# Patient Record
Sex: FEMALE | Race: WHITE | NOT HISPANIC OR LATINO | Employment: OTHER | URBAN - METROPOLITAN AREA
[De-identification: names, ages, dates, MRNs, and addresses within clinical notes are randomized per-mention and may not be internally consistent; named-entity substitution may affect disease eponyms.]

---

## 2017-05-22 ENCOUNTER — COMMUNICATION - HEALTHEAST (OUTPATIENT)
Dept: FAMILY MEDICINE | Facility: CLINIC | Age: 62
End: 2017-05-22

## 2017-10-19 ENCOUNTER — OFFICE VISIT - HEALTHEAST (OUTPATIENT)
Dept: FAMILY MEDICINE | Facility: CLINIC | Age: 62
End: 2017-10-19

## 2017-10-19 DIAGNOSIS — R03.0 PREHYPERTENSION: ICD-10-CM

## 2017-10-19 DIAGNOSIS — L57.0 ACTINIC KERATOSIS: ICD-10-CM

## 2017-10-19 DIAGNOSIS — Z00.00 ROUTINE GENERAL MEDICAL EXAMINATION AT A HEALTH CARE FACILITY: ICD-10-CM

## 2017-10-19 DIAGNOSIS — M85.80 OSTEOPENIA: ICD-10-CM

## 2017-10-19 DIAGNOSIS — Z12.39 SCREENING FOR MALIGNANT NEOPLASM OF BREAST: ICD-10-CM

## 2017-10-19 DIAGNOSIS — Z12.31 ENCOUNTER FOR SCREENING MAMMOGRAM FOR MALIGNANT NEOPLASM OF BREAST: ICD-10-CM

## 2017-10-19 DIAGNOSIS — Z12.39 SCREENING FOR BREAST CANCER: ICD-10-CM

## 2017-10-19 DIAGNOSIS — M54.2 NECK PAIN: ICD-10-CM

## 2017-10-19 DIAGNOSIS — M79.603 ARM PAIN: ICD-10-CM

## 2017-10-19 DIAGNOSIS — E78.5 HYPERLIPIDEMIA: ICD-10-CM

## 2017-10-19 DIAGNOSIS — Z23 NEED FOR VACCINATION: ICD-10-CM

## 2017-10-19 DIAGNOSIS — K63.5 COLON POLYPS: ICD-10-CM

## 2017-10-19 ASSESSMENT — MIFFLIN-ST. JEOR: SCORE: 1199.86

## 2017-10-23 ENCOUNTER — AMBULATORY - HEALTHEAST (OUTPATIENT)
Dept: SCHEDULING | Facility: CLINIC | Age: 62
End: 2017-10-23

## 2017-10-23 DIAGNOSIS — M85.80 OSTEOPENIA: ICD-10-CM

## 2017-11-09 ENCOUNTER — HOSPITAL ENCOUNTER (OUTPATIENT)
Dept: MAMMOGRAPHY | Facility: HOSPITAL | Age: 62
Discharge: HOME | End: 2017-11-09
Attending: FAMILY MEDICINE

## 2017-11-09 DIAGNOSIS — Z12.31 ENCOUNTER FOR SCREENING MAMMOGRAM FOR MALIGNANT NEOPLASM OF BREAST: ICD-10-CM

## 2017-11-13 ENCOUNTER — COMMUNICATION - HEALTHEAST (OUTPATIENT)
Dept: PHYSICAL MEDICINE AND REHAB | Facility: CLINIC | Age: 62
End: 2017-11-13

## 2017-11-17 ENCOUNTER — AMBULATORY - HEALTHEAST (OUTPATIENT)
Dept: NURSING | Facility: CLINIC | Age: 62
End: 2017-11-17

## 2017-11-17 ENCOUNTER — AMBULATORY - HEALTHEAST (OUTPATIENT)
Dept: LAB | Facility: CLINIC | Age: 62
End: 2017-11-17

## 2017-11-17 DIAGNOSIS — E78.5 HYPERLIPIDEMIA: ICD-10-CM

## 2017-11-17 DIAGNOSIS — M85.80 OSTEOPENIA: ICD-10-CM

## 2017-11-17 DIAGNOSIS — R03.0 PREHYPERTENSION: ICD-10-CM

## 2017-11-17 LAB
CHOLEST SERPL-MCNC: 211 MG/DL
FASTING STATUS PATIENT QL REPORTED: YES
HDLC SERPL-MCNC: 62 MG/DL
LDLC SERPL CALC-MCNC: 134 MG/DL
TRIGL SERPL-MCNC: 75 MG/DL

## 2017-11-21 ENCOUNTER — COMMUNICATION - HEALTHEAST (OUTPATIENT)
Dept: FAMILY MEDICINE | Facility: CLINIC | Age: 62
End: 2017-11-21

## 2018-02-12 ENCOUNTER — COMMUNICATION - HEALTHEAST (OUTPATIENT)
Dept: FAMILY MEDICINE | Facility: CLINIC | Age: 63
End: 2018-02-12

## 2018-03-09 ENCOUNTER — HOSPITAL ENCOUNTER (OUTPATIENT)
Dept: PHYSICAL MEDICINE AND REHAB | Facility: CLINIC | Age: 63
Discharge: HOME | End: 2018-03-09
Attending: FAMILY MEDICINE

## 2018-03-09 DIAGNOSIS — M54.50 LUMBAR SPINE PAIN: ICD-10-CM

## 2018-03-09 DIAGNOSIS — M47.812 CERVICAL SPONDYLOSIS: ICD-10-CM

## 2018-03-09 DIAGNOSIS — M54.2 CERVICALGIA: ICD-10-CM

## 2018-03-09 DIAGNOSIS — M25.512 LEFT SHOULDER PAIN: ICD-10-CM

## 2018-03-09 ASSESSMENT — MIFFLIN-ST. JEOR: SCORE: 1215.81

## 2018-03-15 ENCOUNTER — COMMUNICATION - HEALTHEAST (OUTPATIENT)
Dept: PHYSICAL MEDICINE AND REHAB | Facility: CLINIC | Age: 63
End: 2018-03-15

## 2018-03-15 DIAGNOSIS — M25.519 SHOULDER PAIN: ICD-10-CM

## 2018-03-21 ENCOUNTER — HOSPITAL ENCOUNTER (OUTPATIENT)
Dept: PHYSICAL MEDICINE AND REHAB | Facility: CLINIC | Age: 63
Discharge: HOME | End: 2018-03-21
Attending: PHYSICAL MEDICINE & REHABILITATION

## 2018-03-21 DIAGNOSIS — M67.912 TENDINOPATHY OF ROTATOR CUFF, LEFT: ICD-10-CM

## 2018-03-21 DIAGNOSIS — M25.519 SHOULDER PAIN: ICD-10-CM

## 2018-03-21 DIAGNOSIS — M19.012 GLENOHUMERAL ARTHRITIS, LEFT: ICD-10-CM

## 2018-03-21 ASSESSMENT — MIFFLIN-ST. JEOR: SCORE: 1215.81

## 2018-03-26 ENCOUNTER — OFFICE VISIT - HEALTHEAST (OUTPATIENT)
Dept: PHYSICAL THERAPY | Facility: CLINIC | Age: 63
End: 2018-03-26

## 2018-03-26 DIAGNOSIS — M54.50 CHRONIC BILATERAL LOW BACK PAIN WITHOUT SCIATICA: ICD-10-CM

## 2018-03-26 DIAGNOSIS — M25.512 CHRONIC LEFT SHOULDER PAIN: ICD-10-CM

## 2018-03-26 DIAGNOSIS — G89.29 CHRONIC BILATERAL LOW BACK PAIN WITHOUT SCIATICA: ICD-10-CM

## 2018-03-26 DIAGNOSIS — M62.81 GENERALIZED MUSCLE WEAKNESS: ICD-10-CM

## 2018-03-26 DIAGNOSIS — G89.29 CHRONIC LEFT SHOULDER PAIN: ICD-10-CM

## 2018-03-26 DIAGNOSIS — M54.2 CHRONIC NECK PAIN: ICD-10-CM

## 2018-03-26 DIAGNOSIS — R29.3 ABNORMAL POSTURE: ICD-10-CM

## 2018-03-26 DIAGNOSIS — G89.29 CHRONIC NECK PAIN: ICD-10-CM

## 2018-04-05 ENCOUNTER — OFFICE VISIT - HEALTHEAST (OUTPATIENT)
Dept: FAMILY MEDICINE | Facility: CLINIC | Age: 63
End: 2018-04-05

## 2018-04-05 DIAGNOSIS — J01.90 ACUTE SINUSITIS, RECURRENCE NOT SPECIFIED, UNSPECIFIED LOCATION: ICD-10-CM

## 2018-04-06 ENCOUNTER — OFFICE VISIT - HEALTHEAST (OUTPATIENT)
Dept: PHYSICAL THERAPY | Facility: CLINIC | Age: 63
End: 2018-04-06

## 2018-04-06 DIAGNOSIS — M54.50 CHRONIC BILATERAL LOW BACK PAIN WITHOUT SCIATICA: ICD-10-CM

## 2018-04-06 DIAGNOSIS — G89.29 CHRONIC LEFT SHOULDER PAIN: ICD-10-CM

## 2018-04-06 DIAGNOSIS — M54.2 CHRONIC NECK PAIN: ICD-10-CM

## 2018-04-06 DIAGNOSIS — M62.81 GENERALIZED MUSCLE WEAKNESS: ICD-10-CM

## 2018-04-06 DIAGNOSIS — G89.29 CHRONIC BILATERAL LOW BACK PAIN WITHOUT SCIATICA: ICD-10-CM

## 2018-04-06 DIAGNOSIS — R29.3 ABNORMAL POSTURE: ICD-10-CM

## 2018-04-06 DIAGNOSIS — G89.29 CHRONIC NECK PAIN: ICD-10-CM

## 2018-04-06 DIAGNOSIS — M25.512 CHRONIC LEFT SHOULDER PAIN: ICD-10-CM

## 2018-04-09 ENCOUNTER — OFFICE VISIT - HEALTHEAST (OUTPATIENT)
Dept: PHYSICAL THERAPY | Facility: CLINIC | Age: 63
End: 2018-04-09

## 2018-04-09 DIAGNOSIS — M25.512 CHRONIC LEFT SHOULDER PAIN: ICD-10-CM

## 2018-04-09 DIAGNOSIS — M54.50 CHRONIC BILATERAL LOW BACK PAIN WITHOUT SCIATICA: ICD-10-CM

## 2018-04-09 DIAGNOSIS — R29.3 ABNORMAL POSTURE: ICD-10-CM

## 2018-04-09 DIAGNOSIS — G89.29 CHRONIC NECK PAIN: ICD-10-CM

## 2018-04-09 DIAGNOSIS — M62.81 GENERALIZED MUSCLE WEAKNESS: ICD-10-CM

## 2018-04-09 DIAGNOSIS — M54.2 CHRONIC NECK PAIN: ICD-10-CM

## 2018-04-09 DIAGNOSIS — G89.29 CHRONIC BILATERAL LOW BACK PAIN WITHOUT SCIATICA: ICD-10-CM

## 2018-04-09 DIAGNOSIS — G89.29 CHRONIC LEFT SHOULDER PAIN: ICD-10-CM

## 2018-04-12 ENCOUNTER — HOSPITAL ENCOUNTER (OUTPATIENT)
Dept: PHYSICAL MEDICINE AND REHAB | Facility: CLINIC | Age: 63
Discharge: HOME | End: 2018-04-12
Attending: PHYSICAL MEDICINE & REHABILITATION

## 2018-04-12 ENCOUNTER — OFFICE VISIT - HEALTHEAST (OUTPATIENT)
Dept: PHYSICAL THERAPY | Facility: CLINIC | Age: 63
End: 2018-04-12

## 2018-04-12 DIAGNOSIS — M67.912 TENDINOPATHY OF ROTATOR CUFF, LEFT: ICD-10-CM

## 2018-04-12 DIAGNOSIS — G89.29 CHRONIC BILATERAL LOW BACK PAIN WITHOUT SCIATICA: ICD-10-CM

## 2018-04-12 DIAGNOSIS — G89.29 CHRONIC LEFT SHOULDER PAIN: ICD-10-CM

## 2018-04-12 DIAGNOSIS — M47.812 CERVICAL SPONDYLOSIS: ICD-10-CM

## 2018-04-12 DIAGNOSIS — M62.81 GENERALIZED MUSCLE WEAKNESS: ICD-10-CM

## 2018-04-12 DIAGNOSIS — M54.2 CHRONIC NECK PAIN: ICD-10-CM

## 2018-04-12 DIAGNOSIS — M19.012 GLENOHUMERAL ARTHRITIS, LEFT: ICD-10-CM

## 2018-04-12 DIAGNOSIS — M25.512 CHRONIC LEFT SHOULDER PAIN: ICD-10-CM

## 2018-04-12 DIAGNOSIS — M54.50 CHRONIC BILATERAL LOW BACK PAIN WITHOUT SCIATICA: ICD-10-CM

## 2018-04-12 DIAGNOSIS — R29.3 ABNORMAL POSTURE: ICD-10-CM

## 2018-04-12 DIAGNOSIS — G89.29 CHRONIC NECK PAIN: ICD-10-CM

## 2018-04-12 ASSESSMENT — MIFFLIN-ST. JEOR: SCORE: 1197.67

## 2018-04-16 ENCOUNTER — AMBULATORY - HEALTHEAST (OUTPATIENT)
Dept: FAMILY MEDICINE | Facility: CLINIC | Age: 63
End: 2018-04-16

## 2018-04-16 ENCOUNTER — OFFICE VISIT - HEALTHEAST (OUTPATIENT)
Dept: PHYSICAL THERAPY | Facility: CLINIC | Age: 63
End: 2018-04-16

## 2018-04-16 ENCOUNTER — COMMUNICATION - HEALTHEAST (OUTPATIENT)
Dept: SCHEDULING | Facility: CLINIC | Age: 63
End: 2018-04-16

## 2018-04-16 DIAGNOSIS — M54.2 CHRONIC NECK PAIN: ICD-10-CM

## 2018-04-16 DIAGNOSIS — G89.29 CHRONIC NECK PAIN: ICD-10-CM

## 2018-04-16 DIAGNOSIS — M62.81 GENERALIZED MUSCLE WEAKNESS: ICD-10-CM

## 2018-04-16 DIAGNOSIS — M25.512 CHRONIC LEFT SHOULDER PAIN: ICD-10-CM

## 2018-04-16 DIAGNOSIS — G89.29 CHRONIC LEFT SHOULDER PAIN: ICD-10-CM

## 2018-04-16 DIAGNOSIS — M54.50 CHRONIC BILATERAL LOW BACK PAIN WITHOUT SCIATICA: ICD-10-CM

## 2018-04-16 DIAGNOSIS — R29.3 ABNORMAL POSTURE: ICD-10-CM

## 2018-04-16 DIAGNOSIS — G89.29 CHRONIC BILATERAL LOW BACK PAIN WITHOUT SCIATICA: ICD-10-CM

## 2018-04-19 ENCOUNTER — OFFICE VISIT - HEALTHEAST (OUTPATIENT)
Dept: PHYSICAL THERAPY | Facility: CLINIC | Age: 63
End: 2018-04-19

## 2018-04-19 DIAGNOSIS — M54.50 CHRONIC BILATERAL LOW BACK PAIN WITHOUT SCIATICA: ICD-10-CM

## 2018-04-19 DIAGNOSIS — R29.3 ABNORMAL POSTURE: ICD-10-CM

## 2018-04-19 DIAGNOSIS — M62.81 GENERALIZED MUSCLE WEAKNESS: ICD-10-CM

## 2018-04-19 DIAGNOSIS — G89.29 CHRONIC BILATERAL LOW BACK PAIN WITHOUT SCIATICA: ICD-10-CM

## 2018-04-19 DIAGNOSIS — M54.2 CHRONIC NECK PAIN: ICD-10-CM

## 2018-04-19 DIAGNOSIS — M25.512 CHRONIC LEFT SHOULDER PAIN: ICD-10-CM

## 2018-04-19 DIAGNOSIS — G89.29 CHRONIC NECK PAIN: ICD-10-CM

## 2018-04-19 DIAGNOSIS — G89.29 CHRONIC LEFT SHOULDER PAIN: ICD-10-CM

## 2018-07-30 ENCOUNTER — COMMUNICATION - HEALTHEAST (OUTPATIENT)
Dept: PHYSICAL MEDICINE AND REHAB | Facility: CLINIC | Age: 63
End: 2018-07-30

## 2018-08-09 ENCOUNTER — HOSPITAL ENCOUNTER (OUTPATIENT)
Dept: PHYSICAL MEDICINE AND REHAB | Facility: CLINIC | Age: 63
Discharge: HOME | End: 2018-08-09
Attending: PHYSICAL MEDICINE & REHABILITATION

## 2018-08-09 DIAGNOSIS — M67.912 TENDINOPATHY OF ROTATOR CUFF, LEFT: ICD-10-CM

## 2018-08-09 DIAGNOSIS — M25.519 SHOULDER PAIN: ICD-10-CM

## 2018-08-09 DIAGNOSIS — M19.012 GLENOHUMERAL ARTHRITIS, LEFT: ICD-10-CM

## 2018-08-09 ASSESSMENT — MIFFLIN-ST. JEOR: SCORE: 1197.67

## 2018-08-10 ENCOUNTER — HOSPITAL ENCOUNTER (OUTPATIENT)
Dept: RADIOLOGY | Facility: HOSPITAL | Age: 63
Discharge: HOME | End: 2018-08-10
Attending: PHYSICAL MEDICINE & REHABILITATION

## 2018-08-10 ENCOUNTER — COMMUNICATION - HEALTHEAST (OUTPATIENT)
Dept: PHYSICAL MEDICINE AND REHAB | Facility: CLINIC | Age: 63
End: 2018-08-10

## 2018-08-10 DIAGNOSIS — M19.012 GLENOHUMERAL ARTHRITIS, LEFT: ICD-10-CM

## 2018-08-10 DIAGNOSIS — M25.519 SHOULDER PAIN: ICD-10-CM

## 2018-08-13 ENCOUNTER — COMMUNICATION - HEALTHEAST (OUTPATIENT)
Dept: PHYSICAL MEDICINE AND REHAB | Facility: CLINIC | Age: 63
End: 2018-08-13

## 2018-08-21 ENCOUNTER — RECORDS - HEALTHEAST (OUTPATIENT)
Dept: ADMINISTRATIVE | Facility: OTHER | Age: 63
End: 2018-08-21

## 2018-09-25 ENCOUNTER — RECORDS - HEALTHEAST (OUTPATIENT)
Dept: ADMINISTRATIVE | Facility: OTHER | Age: 63
End: 2018-09-25

## 2018-10-01 ENCOUNTER — OFFICE VISIT - HEALTHEAST (OUTPATIENT)
Dept: FAMILY MEDICINE | Facility: CLINIC | Age: 63
End: 2018-10-01

## 2018-10-01 DIAGNOSIS — E78.5 HYPERLIPIDEMIA: ICD-10-CM

## 2018-10-01 DIAGNOSIS — I10 HTN (HYPERTENSION): ICD-10-CM

## 2018-10-01 DIAGNOSIS — Z00.00 ROUTINE GENERAL MEDICAL EXAMINATION AT A HEALTH CARE FACILITY: ICD-10-CM

## 2018-10-01 DIAGNOSIS — M19.90 OSTEOARTHRITIS: ICD-10-CM

## 2018-10-01 DIAGNOSIS — R41.3 MEMORY LOSS: ICD-10-CM

## 2018-10-01 DIAGNOSIS — M85.80 OSTEOPENIA: ICD-10-CM

## 2018-10-01 DIAGNOSIS — Z12.31 ENCOUNTER FOR SCREENING MAMMOGRAM FOR MALIGNANT NEOPLASM OF BREAST: ICD-10-CM

## 2018-10-01 DIAGNOSIS — R03.0 PREHYPERTENSION: ICD-10-CM

## 2018-10-01 DIAGNOSIS — Z12.11 SCREEN FOR COLON CANCER: ICD-10-CM

## 2018-10-01 DIAGNOSIS — Z13.6 ENCOUNTER FOR SCREENING FOR CARDIOVASCULAR DISORDERS: ICD-10-CM

## 2018-10-01 DIAGNOSIS — Z85.828 HISTORY OF BASAL CELL CARCINOMA: ICD-10-CM

## 2018-10-01 LAB
ALBUMIN SERPL-MCNC: 4.2 G/DL (ref 3.5–5)
ALP SERPL-CCNC: 74 U/L (ref 45–120)
ALT SERPL W P-5'-P-CCNC: 25 U/L (ref 0–45)
ANION GAP SERPL CALCULATED.3IONS-SCNC: 10 MMOL/L (ref 5–18)
AST SERPL W P-5'-P-CCNC: 22 U/L (ref 0–40)
ATRIAL RATE - MUSE: 60 BPM
BILIRUB SERPL-MCNC: 0.6 MG/DL (ref 0–1)
BUN SERPL-MCNC: 20 MG/DL (ref 8–22)
CALCIUM SERPL-MCNC: 9.8 MG/DL (ref 8.5–10.5)
CHLORIDE BLD-SCNC: 105 MMOL/L (ref 98–107)
CHOLEST SERPL-MCNC: 220 MG/DL
CO2 SERPL-SCNC: 27 MMOL/L (ref 22–31)
CREAT SERPL-MCNC: 0.83 MG/DL (ref 0.6–1.1)
DIASTOLIC BLOOD PRESSURE - MUSE: NORMAL MMHG
ERYTHROCYTE [DISTWIDTH] IN BLOOD BY AUTOMATED COUNT: 12.9 % (ref 11–14.5)
FASTING STATUS PATIENT QL REPORTED: NO
GFR SERPL CREATININE-BSD FRML MDRD: >60 ML/MIN/1.73M2
GLUCOSE BLD-MCNC: 85 MG/DL (ref 70–125)
HCT VFR BLD AUTO: 47.4 % (ref 35–47)
HDLC SERPL-MCNC: 68 MG/DL
HGB BLD-MCNC: 16 G/DL (ref 12–16)
INTERPRETATION ECG - MUSE: NORMAL
LDLC SERPL CALC-MCNC: 131 MG/DL
MCH RBC QN AUTO: 30.9 PG (ref 27–34)
MCHC RBC AUTO-ENTMCNC: 33.8 G/DL (ref 32–36)
MCV RBC AUTO: 91 FL (ref 80–100)
P AXIS - MUSE: 49 DEGREES
PLATELET # BLD AUTO: 245 THOU/UL (ref 140–440)
PMV BLD AUTO: 8.3 FL (ref 7–10)
POTASSIUM BLD-SCNC: 4.8 MMOL/L (ref 3.5–5)
PR INTERVAL - MUSE: 148 MS
PROT SERPL-MCNC: 7.4 G/DL (ref 6–8)
QRS DURATION - MUSE: 72 MS
QT - MUSE: 388 MS
QTC - MUSE: 388 MS
R AXIS - MUSE: 30 DEGREES
RBC # BLD AUTO: 5.2 MILL/UL (ref 3.8–5.4)
SODIUM SERPL-SCNC: 142 MMOL/L (ref 136–145)
SYSTOLIC BLOOD PRESSURE - MUSE: NORMAL MMHG
T AXIS - MUSE: 37 DEGREES
TRIGL SERPL-MCNC: 103 MG/DL
VENTRICULAR RATE- MUSE: 60 BPM
WBC: 6.3 THOU/UL (ref 4–11)

## 2018-10-01 ASSESSMENT — MIFFLIN-ST. JEOR: SCORE: 1190.29

## 2018-10-02 ENCOUNTER — COMMUNICATION - HEALTHEAST (OUTPATIENT)
Dept: FAMILY MEDICINE | Facility: CLINIC | Age: 63
End: 2018-10-02

## 2018-10-02 LAB
25(OH)D3 SERPL-MCNC: 46.3 NG/ML (ref 30–80)
25(OH)D3 SERPL-MCNC: 46.3 NG/ML (ref 30–80)

## 2018-10-04 ENCOUNTER — AMBULATORY - HEALTHEAST (OUTPATIENT)
Dept: FAMILY MEDICINE | Facility: CLINIC | Age: 63
End: 2018-10-04

## 2018-10-17 ENCOUNTER — COMMUNICATION - HEALTHEAST (OUTPATIENT)
Dept: FAMILY MEDICINE | Facility: CLINIC | Age: 63
End: 2018-10-17

## 2018-10-17 ENCOUNTER — AMBULATORY - HEALTHEAST (OUTPATIENT)
Dept: NURSING | Facility: CLINIC | Age: 63
End: 2018-10-17

## 2018-10-17 ENCOUNTER — AMBULATORY - HEALTHEAST (OUTPATIENT)
Dept: FAMILY MEDICINE | Facility: CLINIC | Age: 63
End: 2018-10-17

## 2018-10-17 DIAGNOSIS — I10 ESSENTIAL HYPERTENSION: ICD-10-CM

## 2018-10-31 ENCOUNTER — AMBULATORY - HEALTHEAST (OUTPATIENT)
Dept: NURSING | Facility: CLINIC | Age: 63
End: 2018-10-31

## 2018-11-16 ENCOUNTER — COMMUNICATION - HEALTHEAST (OUTPATIENT)
Dept: FAMILY MEDICINE | Facility: CLINIC | Age: 63
End: 2018-11-16

## 2018-11-16 ENCOUNTER — AMBULATORY - HEALTHEAST (OUTPATIENT)
Dept: FAMILY MEDICINE | Facility: CLINIC | Age: 63
End: 2018-11-16

## 2018-11-29 ENCOUNTER — COMMUNICATION - HEALTHEAST (OUTPATIENT)
Dept: FAMILY MEDICINE | Facility: CLINIC | Age: 63
End: 2018-11-29

## 2018-11-29 ENCOUNTER — AMBULATORY - HEALTHEAST (OUTPATIENT)
Dept: NURSING | Facility: CLINIC | Age: 63
End: 2018-11-29

## 2018-12-13 ENCOUNTER — HOSPITAL ENCOUNTER (OUTPATIENT)
Dept: MAMMOGRAPHY | Facility: CLINIC | Age: 63
Discharge: HOME | End: 2018-12-13
Attending: FAMILY MEDICINE

## 2018-12-13 DIAGNOSIS — Z12.31 ENCOUNTER FOR SCREENING MAMMOGRAM FOR MALIGNANT NEOPLASM OF BREAST: ICD-10-CM

## 2018-12-26 ENCOUNTER — RECORDS - HEALTHEAST (OUTPATIENT)
Dept: ADMINISTRATIVE | Facility: OTHER | Age: 63
End: 2018-12-26

## 2019-01-04 ENCOUNTER — COMMUNICATION - HEALTHEAST (OUTPATIENT)
Dept: FAMILY MEDICINE | Facility: CLINIC | Age: 64
End: 2019-01-04

## 2019-01-10 ENCOUNTER — RECORDS - HEALTHEAST (OUTPATIENT)
Dept: ADMINISTRATIVE | Facility: OTHER | Age: 64
End: 2019-01-10

## 2019-01-10 ENCOUNTER — TRANSFERRED RECORDS (OUTPATIENT)
Dept: HEALTH INFORMATION MANAGEMENT | Facility: CLINIC | Age: 64
End: 2019-01-10

## 2019-01-31 ENCOUNTER — COMMUNICATION - HEALTHEAST (OUTPATIENT)
Dept: FAMILY MEDICINE | Facility: CLINIC | Age: 64
End: 2019-01-31

## 2019-01-31 ENCOUNTER — RECORDS - HEALTHEAST (OUTPATIENT)
Dept: ADMINISTRATIVE | Facility: OTHER | Age: 64
End: 2019-01-31

## 2019-02-14 ENCOUNTER — OFFICE VISIT - HEALTHEAST (OUTPATIENT)
Dept: FAMILY MEDICINE | Facility: CLINIC | Age: 64
End: 2019-02-14

## 2019-02-14 DIAGNOSIS — Z01.818 ENCOUNTER FOR PREOPERATIVE EXAMINATION FOR GENERAL SURGICAL PROCEDURE: ICD-10-CM

## 2019-02-14 DIAGNOSIS — I10 ESSENTIAL HYPERTENSION: ICD-10-CM

## 2019-02-14 RX ORDER — ASPIRIN 81 MG/1
81 TABLET, CHEWABLE ORAL DAILY
Status: SHIPPED | COMMUNITY
Start: 2019-02-14 | End: 2022-01-03

## 2019-02-14 ASSESSMENT — MIFFLIN-ST. JEOR: SCORE: 1213.52

## 2019-02-16 ENCOUNTER — COMMUNICATION - HEALTHEAST (OUTPATIENT)
Dept: FAMILY MEDICINE | Facility: CLINIC | Age: 64
End: 2019-02-16

## 2019-02-16 DIAGNOSIS — J31.0 CHRONIC RHINITIS: ICD-10-CM

## 2019-02-20 ENCOUNTER — AMBULATORY - HEALTHEAST (OUTPATIENT)
Dept: OTHER | Facility: CLINIC | Age: 64
End: 2019-02-20

## 2019-02-27 ENCOUNTER — RECORDS - HEALTHEAST (OUTPATIENT)
Dept: ADMINISTRATIVE | Facility: OTHER | Age: 64
End: 2019-02-27

## 2019-02-28 ENCOUNTER — MEDICAL CORRESPONDENCE (OUTPATIENT)
Dept: HEALTH INFORMATION MANAGEMENT | Facility: CLINIC | Age: 64
End: 2019-02-28

## 2019-03-07 ENCOUNTER — RECORDS - HEALTHEAST (OUTPATIENT)
Dept: ADMINISTRATIVE | Facility: OTHER | Age: 64
End: 2019-03-07

## 2019-04-04 ENCOUNTER — OFFICE VISIT - HEALTHEAST (OUTPATIENT)
Dept: FAMILY MEDICINE | Facility: CLINIC | Age: 64
End: 2019-04-04

## 2019-04-04 DIAGNOSIS — D12.6 ADENOMATOUS POLYP OF COLON, UNSPECIFIED PART OF COLON: ICD-10-CM

## 2019-04-04 DIAGNOSIS — I10 ESSENTIAL HYPERTENSION: ICD-10-CM

## 2019-04-05 LAB — POTASSIUM BLD-SCNC: 4.8 MMOL/L (ref 3.5–5)

## 2019-04-12 ENCOUNTER — COMMUNICATION - HEALTHEAST (OUTPATIENT)
Dept: FAMILY MEDICINE | Facility: CLINIC | Age: 64
End: 2019-04-12

## 2019-04-12 ENCOUNTER — RECORDS - HEALTHEAST (OUTPATIENT)
Dept: ADMINISTRATIVE | Facility: OTHER | Age: 64
End: 2019-04-12

## 2019-04-29 ENCOUNTER — COMMUNICATION - HEALTHEAST (OUTPATIENT)
Dept: FAMILY MEDICINE | Facility: CLINIC | Age: 64
End: 2019-04-29

## 2019-05-24 ENCOUNTER — RECORDS - HEALTHEAST (OUTPATIENT)
Dept: ADMINISTRATIVE | Facility: OTHER | Age: 64
End: 2019-05-24

## 2019-06-05 ENCOUNTER — COMMUNICATION - HEALTHEAST (OUTPATIENT)
Dept: SCHEDULING | Facility: CLINIC | Age: 64
End: 2019-06-05

## 2019-06-06 ENCOUNTER — OFFICE VISIT - HEALTHEAST (OUTPATIENT)
Dept: FAMILY MEDICINE | Facility: CLINIC | Age: 64
End: 2019-06-06

## 2019-06-06 DIAGNOSIS — J01.90 ACUTE SINUSITIS WITH SYMPTOMS > 10 DAYS: ICD-10-CM

## 2019-06-26 ENCOUNTER — COMMUNICATION - HEALTHEAST (OUTPATIENT)
Dept: FAMILY MEDICINE | Facility: CLINIC | Age: 64
End: 2019-06-26

## 2019-08-08 ENCOUNTER — COMMUNICATION - HEALTHEAST (OUTPATIENT)
Dept: FAMILY MEDICINE | Facility: CLINIC | Age: 64
End: 2019-08-08

## 2019-08-08 DIAGNOSIS — I10 ESSENTIAL HYPERTENSION: ICD-10-CM

## 2019-10-10 ENCOUNTER — RECORDS - HEALTHEAST (OUTPATIENT)
Dept: GENERAL RADIOLOGY | Facility: CLINIC | Age: 64
End: 2019-10-10

## 2019-10-10 ENCOUNTER — AMBULATORY - HEALTHEAST (OUTPATIENT)
Dept: SCHEDULING | Facility: CLINIC | Age: 64
End: 2019-10-10

## 2019-10-10 ENCOUNTER — OFFICE VISIT - HEALTHEAST (OUTPATIENT)
Dept: FAMILY MEDICINE | Facility: CLINIC | Age: 64
End: 2019-10-10
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
## 2019-10-10 DIAGNOSIS — M89.9 DISORDER OF BONE AND CARTILAGE: ICD-10-CM

## 2019-10-10 DIAGNOSIS — Z00.00 ROUTINE GENERAL MEDICAL EXAMINATION AT A HEALTH CARE FACILITY: ICD-10-CM

## 2019-10-10 DIAGNOSIS — I10 ESSENTIAL HYPERTENSION: ICD-10-CM

## 2019-10-10 DIAGNOSIS — D12.6 ADENOMATOUS POLYP OF COLON, UNSPECIFIED PART OF COLON: ICD-10-CM

## 2019-10-10 DIAGNOSIS — Z78.0 MENOPAUSE: ICD-10-CM

## 2019-10-10 DIAGNOSIS — E78.2 MIXED HYPERLIPIDEMIA: ICD-10-CM

## 2019-10-10 DIAGNOSIS — Z85.828 HISTORY OF BASAL CELL CARCINOMA: ICD-10-CM

## 2019-10-10 DIAGNOSIS — Z12.31 VISIT FOR SCREENING MAMMOGRAM: ICD-10-CM

## 2019-10-10 DIAGNOSIS — Z83.49 FAMILY HISTORY OF HYPOTHYROIDISM: ICD-10-CM

## 2019-10-10 DIAGNOSIS — R19.5 CHANGE IN STOOL: ICD-10-CM

## 2019-10-10 DIAGNOSIS — M94.9 DISORDER OF BONE AND CARTILAGE: ICD-10-CM
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
## 2019-10-10 LAB
ALBUMIN SERPL-MCNC: 4.3 G/DL (ref 3.5–5)
ALP SERPL-CCNC: 69 U/L (ref 45–120)
ALT SERPL W P-5'-P-CCNC: 23 U/L (ref 0–45)
ANION GAP SERPL CALCULATED.3IONS-SCNC: 8 MMOL/L (ref 5–18)
AST SERPL W P-5'-P-CCNC: 29 U/L (ref 0–40)
BILIRUB SERPL-MCNC: 0.7 MG/DL (ref 0–1)
BUN SERPL-MCNC: 20 MG/DL (ref 8–22)
CALCIUM SERPL-MCNC: 9.9 MG/DL (ref 8.5–10.5)
CHLORIDE BLD-SCNC: 105 MMOL/L (ref 98–107)
CHOLEST SERPL-MCNC: 216 MG/DL
CO2 SERPL-SCNC: 27 MMOL/L (ref 22–31)
CREAT SERPL-MCNC: 0.88 MG/DL (ref 0.6–1.1)
ERYTHROCYTE [DISTWIDTH] IN BLOOD BY AUTOMATED COUNT: 11.6 % (ref 11–14.5)
FASTING STATUS PATIENT QL REPORTED: YES
GFR SERPL CREATININE-BSD FRML MDRD: >60 ML/MIN/1.73M2
GLUCOSE BLD-MCNC: 90 MG/DL (ref 70–125)
HCT VFR BLD AUTO: 40.8 % (ref 35–47)
HDLC SERPL-MCNC: 70 MG/DL
HGB BLD-MCNC: 13.9 G/DL (ref 12–16)
LDLC SERPL CALC-MCNC: 129 MG/DL
MCH RBC QN AUTO: 31.4 PG (ref 27–34)
MCHC RBC AUTO-ENTMCNC: 34 G/DL (ref 32–36)
MCV RBC AUTO: 92 FL (ref 80–100)
PLATELET # BLD AUTO: 233 THOU/UL (ref 140–440)
PMV BLD AUTO: 8.1 FL (ref 7–10)
POTASSIUM BLD-SCNC: 4.4 MMOL/L (ref 3.5–5)
PROT SERPL-MCNC: 7.4 G/DL (ref 6–8)
RBC # BLD AUTO: 4.42 MILL/UL (ref 3.8–5.4)
SODIUM SERPL-SCNC: 140 MMOL/L (ref 136–145)
TRIGL SERPL-MCNC: 84 MG/DL
TSH SERPL DL<=0.005 MIU/L-ACNC: 1.01 UIU/ML (ref 0.3–5)
WBC: 6.8 THOU/UL (ref 4–11)

## 2019-10-11 LAB
25(OH)D3 SERPL-MCNC: 47.6 NG/ML (ref 30–80)
25(OH)D3 SERPL-MCNC: 47.6 NG/ML (ref 30–80)

## 2019-11-03 ENCOUNTER — COMMUNICATION - HEALTHEAST (OUTPATIENT)
Dept: FAMILY MEDICINE | Facility: CLINIC | Age: 64
End: 2019-11-03

## 2019-11-03 DIAGNOSIS — I10 ESSENTIAL HYPERTENSION: ICD-10-CM

## 2019-12-18 ENCOUNTER — HOSPITAL ENCOUNTER (OUTPATIENT)
Dept: MAMMOGRAPHY | Facility: CLINIC | Age: 64
Discharge: HOME | End: 2019-12-18
Attending: FAMILY MEDICINE

## 2019-12-18 DIAGNOSIS — Z12.31 VISIT FOR SCREENING MAMMOGRAM: ICD-10-CM

## 2019-12-22 ENCOUNTER — COMMUNICATION - HEALTHEAST (OUTPATIENT)
Dept: FAMILY MEDICINE | Facility: CLINIC | Age: 64
End: 2019-12-22

## 2020-01-02 ENCOUNTER — RECORDS - HEALTHEAST (OUTPATIENT)
Dept: ADMINISTRATIVE | Facility: OTHER | Age: 65
End: 2020-01-02

## 2020-01-16 ENCOUNTER — RECORDS - HEALTHEAST (OUTPATIENT)
Dept: ADMINISTRATIVE | Facility: OTHER | Age: 65
End: 2020-01-16

## 2020-01-16 ENCOUNTER — TRANSFERRED RECORDS (OUTPATIENT)
Dept: HEALTH INFORMATION MANAGEMENT | Facility: CLINIC | Age: 65
End: 2020-01-16

## 2020-01-17 ENCOUNTER — AMBULATORY - HEALTHEAST (OUTPATIENT)
Dept: OTHER | Facility: CLINIC | Age: 65
End: 2020-01-17

## 2020-02-24 ENCOUNTER — COMMUNICATION - HEALTHEAST (OUTPATIENT)
Dept: FAMILY MEDICINE | Facility: CLINIC | Age: 65
End: 2020-02-24

## 2020-02-24 DIAGNOSIS — R15.9 INCONTINENCE OF FECES, UNSPECIFIED FECAL INCONTINENCE TYPE: ICD-10-CM

## 2020-03-05 ENCOUNTER — RECORDS - HEALTHEAST (OUTPATIENT)
Dept: ADMINISTRATIVE | Facility: OTHER | Age: 65
End: 2020-03-05

## 2020-04-13 ENCOUNTER — AMBULATORY - HEALTHEAST (OUTPATIENT)
Dept: NURSING | Facility: CLINIC | Age: 65
End: 2020-04-13

## 2020-04-13 DIAGNOSIS — I10 ESSENTIAL HYPERTENSION: ICD-10-CM

## 2020-04-15 ENCOUNTER — OFFICE VISIT - HEALTHEAST (OUTPATIENT)
Dept: FAMILY MEDICINE | Facility: CLINIC | Age: 65
End: 2020-04-15

## 2020-04-15 DIAGNOSIS — I10 ESSENTIAL HYPERTENSION: ICD-10-CM

## 2020-04-23 ENCOUNTER — AMBULATORY - HEALTHEAST (OUTPATIENT)
Dept: NURSING | Facility: CLINIC | Age: 65
End: 2020-04-23

## 2020-04-23 DIAGNOSIS — I10 ESSENTIAL HYPERTENSION: ICD-10-CM

## 2020-05-06 ENCOUNTER — AMBULATORY - HEALTHEAST (OUTPATIENT)
Dept: LAB | Facility: CLINIC | Age: 65
End: 2020-05-06

## 2020-05-06 DIAGNOSIS — I10 ESSENTIAL HYPERTENSION: ICD-10-CM

## 2020-05-06 LAB
ANION GAP SERPL CALCULATED.3IONS-SCNC: 9 MMOL/L (ref 5–18)
BUN SERPL-MCNC: 17 MG/DL (ref 8–22)
CALCIUM SERPL-MCNC: 9.1 MG/DL (ref 8.5–10.5)
CHLORIDE BLD-SCNC: 107 MMOL/L (ref 98–107)
CO2 SERPL-SCNC: 26 MMOL/L (ref 22–31)
CREAT SERPL-MCNC: 0.78 MG/DL (ref 0.6–1.1)
GFR SERPL CREATININE-BSD FRML MDRD: >60 ML/MIN/1.73M2
GLUCOSE BLD-MCNC: 100 MG/DL (ref 70–125)
POTASSIUM BLD-SCNC: 4 MMOL/L (ref 3.5–5)
SODIUM SERPL-SCNC: 142 MMOL/L (ref 136–145)

## 2020-05-07 ENCOUNTER — COMMUNICATION - HEALTHEAST (OUTPATIENT)
Dept: FAMILY MEDICINE | Facility: CLINIC | Age: 65
End: 2020-05-07

## 2020-05-12 ENCOUNTER — RECORDS - HEALTHEAST (OUTPATIENT)
Dept: ADMINISTRATIVE | Facility: OTHER | Age: 65
End: 2020-05-12

## 2020-06-05 ENCOUNTER — COMMUNICATION - HEALTHEAST (OUTPATIENT)
Dept: FAMILY MEDICINE | Facility: CLINIC | Age: 65
End: 2020-06-05

## 2020-06-05 DIAGNOSIS — I10 ESSENTIAL HYPERTENSION: ICD-10-CM

## 2020-09-13 ENCOUNTER — COMMUNICATION - HEALTHEAST (OUTPATIENT)
Dept: FAMILY MEDICINE | Facility: CLINIC | Age: 65
End: 2020-09-13

## 2020-12-09 ENCOUNTER — COMMUNICATION - HEALTHEAST (OUTPATIENT)
Dept: FAMILY MEDICINE | Facility: CLINIC | Age: 65
End: 2020-12-09

## 2020-12-09 DIAGNOSIS — J31.0 CHRONIC RHINITIS: ICD-10-CM

## 2020-12-10 RX ORDER — AZELASTINE 1 MG/ML
SPRAY, METERED NASAL
Qty: 30 ML | Refills: 4 | Status: SHIPPED | OUTPATIENT
Start: 2020-12-10 | End: 2023-01-25

## 2020-12-17 ENCOUNTER — OFFICE VISIT - HEALTHEAST (OUTPATIENT)
Dept: FAMILY MEDICINE | Facility: CLINIC | Age: 65
End: 2020-12-17

## 2020-12-17 DIAGNOSIS — E55.9 VITAMIN D DEFICIENCY: ICD-10-CM

## 2020-12-17 DIAGNOSIS — Z85.828 HISTORY OF BASAL CELL CARCINOMA: ICD-10-CM

## 2020-12-17 DIAGNOSIS — M94.9 DISORDER OF BONE AND CARTILAGE: ICD-10-CM

## 2020-12-17 DIAGNOSIS — E66.3 OVERWEIGHT: ICD-10-CM

## 2020-12-17 DIAGNOSIS — E78.2 MIXED HYPERLIPIDEMIA: ICD-10-CM

## 2020-12-17 DIAGNOSIS — M89.9 DISORDER OF BONE AND CARTILAGE: ICD-10-CM

## 2020-12-17 DIAGNOSIS — D12.6 ADENOMATOUS POLYP OF COLON, UNSPECIFIED PART OF COLON: ICD-10-CM

## 2020-12-17 DIAGNOSIS — I10 ESSENTIAL (PRIMARY) HYPERTENSION: ICD-10-CM

## 2020-12-17 DIAGNOSIS — I10 ESSENTIAL HYPERTENSION: ICD-10-CM

## 2020-12-17 DIAGNOSIS — Z13.6 ENCOUNTER FOR SCREENING FOR CARDIOVASCULAR DISORDERS: ICD-10-CM

## 2020-12-17 LAB
ALBUMIN SERPL-MCNC: 4.1 G/DL (ref 3.5–5)
ALP SERPL-CCNC: 73 U/L (ref 45–120)
ALT SERPL W P-5'-P-CCNC: 22 U/L (ref 0–45)
ANION GAP SERPL CALCULATED.3IONS-SCNC: 10 MMOL/L (ref 5–18)
AST SERPL W P-5'-P-CCNC: 23 U/L (ref 0–40)
BILIRUB SERPL-MCNC: 0.4 MG/DL (ref 0–1)
BUN SERPL-MCNC: 22 MG/DL (ref 8–22)
CALCIUM SERPL-MCNC: 9 MG/DL (ref 8.5–10.5)
CHLORIDE BLD-SCNC: 105 MMOL/L (ref 98–107)
CHOLEST SERPL-MCNC: 213 MG/DL
CO2 SERPL-SCNC: 25 MMOL/L (ref 22–31)
CREAT SERPL-MCNC: 0.79 MG/DL (ref 0.6–1.1)
ERYTHROCYTE [DISTWIDTH] IN BLOOD BY AUTOMATED COUNT: 11.5 % (ref 11–14.5)
FASTING STATUS PATIENT QL REPORTED: NO
GFR SERPL CREATININE-BSD FRML MDRD: >60 ML/MIN/1.73M2
GLUCOSE BLD-MCNC: 77 MG/DL (ref 70–125)
HCT VFR BLD AUTO: 42.1 % (ref 35–47)
HDLC SERPL-MCNC: 68 MG/DL
HGB BLD-MCNC: 14.3 G/DL (ref 12–16)
LDLC SERPL CALC-MCNC: 130 MG/DL
MCH RBC QN AUTO: 31.2 PG (ref 27–34)
MCHC RBC AUTO-ENTMCNC: 34 G/DL (ref 32–36)
MCV RBC AUTO: 92 FL (ref 80–100)
PLATELET # BLD AUTO: 242 THOU/UL (ref 140–440)
PMV BLD AUTO: 7.9 FL (ref 7–10)
POTASSIUM BLD-SCNC: 4.6 MMOL/L (ref 3.5–5)
PROT SERPL-MCNC: 7.1 G/DL (ref 6–8)
RBC # BLD AUTO: 4.59 MILL/UL (ref 3.8–5.4)
SODIUM SERPL-SCNC: 140 MMOL/L (ref 136–145)
TRIGL SERPL-MCNC: 77 MG/DL
TSH SERPL DL<=0.005 MIU/L-ACNC: 1.72 UIU/ML (ref 0.3–5)
WBC: 5.3 THOU/UL (ref 4–11)

## 2020-12-17 ASSESSMENT — MIFFLIN-ST. JEOR: SCORE: 1223.18

## 2020-12-18 LAB
25(OH)D3 SERPL-MCNC: 32.7 NG/ML (ref 30–80)
25(OH)D3 SERPL-MCNC: 32.7 NG/ML (ref 30–80)

## 2020-12-19 ENCOUNTER — COMMUNICATION - HEALTHEAST (OUTPATIENT)
Dept: FAMILY MEDICINE | Facility: CLINIC | Age: 65
End: 2020-12-19

## 2020-12-22 ENCOUNTER — HOSPITAL ENCOUNTER (OUTPATIENT)
Dept: ULTRASOUND IMAGING | Facility: HOSPITAL | Age: 65
Discharge: HOME | End: 2020-12-22
Attending: FAMILY MEDICINE

## 2020-12-22 DIAGNOSIS — Z13.6 ENCOUNTER FOR SCREENING FOR CARDIOVASCULAR DISORDERS: ICD-10-CM

## 2020-12-29 ENCOUNTER — COMMUNICATION - HEALTHEAST (OUTPATIENT)
Dept: FAMILY MEDICINE | Facility: CLINIC | Age: 65
End: 2020-12-29

## 2020-12-29 DIAGNOSIS — M19.90 OSTEOARTHRITIS, UNSPECIFIED OSTEOARTHRITIS TYPE, UNSPECIFIED SITE: ICD-10-CM

## 2020-12-29 RX ORDER — IBUPROFEN 800 MG/1
800 TABLET, FILM COATED ORAL EVERY 8 HOURS PRN
Qty: 90 TABLET | Refills: 3 | Status: SHIPPED | OUTPATIENT
Start: 2020-12-29 | End: 2022-01-24

## 2021-02-01 ENCOUNTER — HOSPITAL ENCOUNTER (OUTPATIENT)
Dept: MAMMOGRAPHY | Facility: CLINIC | Age: 66
Discharge: HOME | End: 2021-02-01
Attending: FAMILY MEDICINE

## 2021-02-01 DIAGNOSIS — Z12.31 VISIT FOR SCREENING MAMMOGRAM: ICD-10-CM

## 2021-02-02 ENCOUNTER — COMMUNICATION - HEALTHEAST (OUTPATIENT)
Dept: FAMILY MEDICINE | Facility: CLINIC | Age: 66
End: 2021-02-02

## 2021-04-09 ENCOUNTER — OFFICE VISIT - HEALTHEAST (OUTPATIENT)
Dept: FAMILY MEDICINE | Facility: CLINIC | Age: 66
End: 2021-04-09

## 2021-04-09 DIAGNOSIS — T14.8XXA WOUND INFECTION: ICD-10-CM

## 2021-04-09 DIAGNOSIS — L08.9 WOUND INFECTION: ICD-10-CM

## 2021-05-26 ENCOUNTER — RECORDS - HEALTHEAST (OUTPATIENT)
Dept: ADMINISTRATIVE | Facility: CLINIC | Age: 66
End: 2021-05-26

## 2021-05-26 VITALS — HEART RATE: 68 BPM | SYSTOLIC BLOOD PRESSURE: 119 MMHG | DIASTOLIC BLOOD PRESSURE: 74 MMHG

## 2021-05-26 VITALS — SYSTOLIC BLOOD PRESSURE: 120 MMHG | HEART RATE: 71 BPM | DIASTOLIC BLOOD PRESSURE: 82 MMHG

## 2021-05-27 ENCOUNTER — RECORDS - HEALTHEAST (OUTPATIENT)
Dept: ADMINISTRATIVE | Facility: CLINIC | Age: 66
End: 2021-05-27

## 2021-05-27 NOTE — PROGRESS NOTES
Assessment:   1. Essential hypertension  Potassium   2. Adenomatous polyp of colon, unspecified part of colon         Plan:  Contine 81 mg Aspirin daily until age 68.    Continue Lisinopril 10 mg daily.    Physical fasting in 6 months.    To do hand therapy.    To follow up with hand surgeon if a couple of weeks.    Can have 2nd Shingles shot anytime at pharmacy.    Hepatitis B shot # 3 and done today.    Patient spoke to GI and colonoscopy due in Jan. 2020 with many polyps on scope in Jan..a 2019.  Please call GI to get this recommendation in writing and send to you and us. I agree with this recommendation, sooner if blood in stool.    Potassium check today.      Subjective:  Chief Complaint   Patient presents with     Follow-up     6 month f/u-hypertension-discuss if pt should continue taking Aspirin       Calin Aguilar, a 64 y.o. year old, comes in to clinic medication check.    Hypertension-blood pressures well controlled on medications.  No side effects from the medications.  Patient is wondering if she needs to continue aspirin.    Recent surgery for first CMC osteoarthritis and carpal tunnel.  She is recovering well and to follow-up with her surgeon.      She is wondering if she should continue to have a 3D mammogram.  She said her breasts are dense and wonders about we will find anything.  I did reassure her that mammograms are still very sensitive.  No other concerns.    Current Outpatient Medications   Medication Sig Note     APPLE CIDER VINEGAR ORAL Take 1 tablet by mouth daily.      aspirin 81 mg chewable tablet Chew 81 mg daily.      azelastine (ASTELIN) 137 mcg (0.1 %) nasal spray Use in each nostril as directed      cholecalciferol, vitamin D3, 1,000 unit tablet Take 1,000 Units by mouth daily.      coenzyme Q10 100 mg/mL Liqd Take by mouth.      diphenhydrAMINE-acetaminophen (TYLENOL PM EXTRA STRENGTH)  mg Tab Take 1 tablet by mouth at bedtime as needed. 2/14/2019: TAKES PRN     docosahexanoic  acid/epa (FISH OIL ORAL) Take 2 tablets by mouth daily.      ibuprofen (ADVIL,MOTRIN) 800 MG tablet TAKE 1 TABLET BY MOUTH 3 TIMES A DAY AS NEEDED      lisinopril (PRINIVIL,ZESTRIL) 10 MG tablet Take 10 mg by mouth daily.      meloxicam (MOBIC) 15 MG tablet Take 0.5-1 tablets (7.5-15 mg total) by mouth daily as needed for pain. 2/14/2019: TAKES PRN     NON FORMULARY Bone up- calcium      CHELSEY'S WORT ORAL Take 1 tablet by mouth daily.      TURMERIC, BULK, MISC Use As Directed.        Patient Active Problem List   Diagnosis     Osteopenia     Insomnia     Osteoarthritis     Anxiety Disorder NOS     Hyperlipidemia     Constipation     Trigger finger     Prehypertension     Adenomatous polyp of colon, unspecified part of colon     Neck pain     Arm pain     History of skin cancer     History of basal cell carcinoma     HTN (hypertension)       Objective:  /74 (Patient Site: Left Arm, Patient Position: Sitting, Cuff Size: Adult Regular)   Pulse 62   Temp (!) 96  F (35.6  C) (Oral)   Resp 16   Wt 153 lb 6 oz (69.6 kg)   LMP 06/30/2010   BMI 25.77 kg/m    General: No apparent distress   Cardiovascular: Regular rate and rhythm without murmurs, rubs, or gallops  Lungs: Clear to auscultation bilaterally, no wheezes, crackles, or rhonchi

## 2021-05-27 NOTE — TELEPHONE ENCOUNTER
Who is calling:  Calin  Reason for Call:  She did speak with [facility] and was informed they mailed a copy of the new letter indicating she should have her colonoscopy repeated in 1 year instead of 3 years. She did receive a copy of this letter recently and is asking if Dr Delgado received the same letter. Unfortunately, this letter has not yet been added into the patient's chart. She is requesting a call once this has been received.  Date of last appointment with primary care: 4/04/2019  Okay to leave a detailed message: Yes

## 2021-05-27 NOTE — PATIENT INSTRUCTIONS - HE
Contine 81 mg Aspirin daily until age 68.    Continue Lisinopril 10 mg daily.    Physical fasting in 6 months.    To do hand therapy.    To follow up with hand surgeon if a couple of weeks.    Can have 2nd Shingles shot anytime at pharmacy.    Hepatitis B shot # 3 and done today.    Patient spoke to GI and colonoscopy due in Jan. 2020 with many polyps on scope in Jan..a 2019.  Please call GI to get this recommendation in writing and send to you and us. I agree with this recommendation, sooner if blood in stool.    Potassium check today.

## 2021-05-28 ENCOUNTER — RECORDS - HEALTHEAST (OUTPATIENT)
Dept: ADMINISTRATIVE | Facility: CLINIC | Age: 66
End: 2021-05-28

## 2021-05-28 NOTE — TELEPHONE ENCOUNTER
Patient Returning Call  Reason for call:  Patient returning call to check on the status of this request.  Information relayed to patient:  Pending providers review and return call.  Patient has additional questions:  yes  If YES, what are your questions/concerns:  Please return call to patient with the status of this letter.   Okay to leave a detailed message?: Yes

## 2021-05-28 NOTE — TELEPHONE ENCOUNTER
Left message to call back for: returning pt call  Information to relay to patient: Left voicemail stating letter from [facility] was scanned into chart and states to repeat yearly. Also sent my chart message.

## 2021-05-29 NOTE — TELEPHONE ENCOUNTER
Patient Returning Call  Reason for call:  Triage response  Information relayed to patient:  The writer read the following to patient per Dr Delgado: So sorry, we do not like to do antibiotics without a visit.  If she is on my chart she could do an E visit otherwise should do an office visit.   Patient has additional questions:  Yes  If YES, what are your questions/concerns:  How does the E visit work?  Do I need an appointment for that?  Writer shared if she goes into her [facility] she will find the E visit instructions.  Writer gave [facility]  information and transferred her to scheduling to see about an office visit.   No further question.    Okay to leave a detailed message?: No call back needed

## 2021-05-29 NOTE — PROGRESS NOTES
ASSESSMENT & PLAN:  1. Acute sinusitis with symptoms > 10 days   Antibiotic as below. She wonders if she will need a second round of zpack, as she has in the past. If not resolved after 10 days she will let me know, and I would consider a second round as long as it helped somewhat.   - azithromycin (ZITHROMAX) 250 MG tablet; 2 tablets (500 mg) on day 1, then 1 tablet daily (250 mg) daily on days 2 through 5  Dispense: 6 tablet; Refill: 0      There are no Patient Instructions on file for this visit.    No orders of the defined types were placed in this encounter.    There are no discontinued medications.    Return for Next scheduled follow up Oct physical Dr. Delgado.    CHIEF COMPLAINT:  Chief Complaint   Patient presents with     URI     c/o slight cough, nasal congestion, post nasal drainage, fatigue, headaches X2 weeks        HISTORY OF PRESENT ILLNESS:  Calin is a 64 y.o. female presenting to the clinic today for sinus symptoms. Started memorial day weekend. Has worsened sicne then. Previous sinus infections on average once yearly, similar symptoms. No history of sinus surgery. Uses nasal spray winter and spring. Post nasal drainage, headaches, nasal congestion, fatigue, facial fullness and pressure. No fevers/chills.  She has had extremes with Z-Lam working the best, but states sometimes she needs a repeat dose.    REVIEW OF SYSTEMS:   All other systems are negative.    PFSH:  Patient Active Problem List   Diagnosis     Osteopenia     Insomnia     Osteoarthritis     Anxiety Disorder NOS     Hyperlipidemia     Constipation     Trigger finger     Prehypertension     Adenomatous polyp of colon, unspecified part of colon     Neck pain     Arm pain     History of skin cancer     History of basal cell carcinoma     HTN (hypertension)        TOBACCO USE:  Social History     Tobacco Use   Smoking Status Former Smoker     Last attempt to quit: 10/19/1987     Years since quittin.6   Smokeless Tobacco Never Used    Tobacco Comment    30 years ago       VITALS:  Vitals:    06/06/19 0930   BP: 123/85   Patient Site: Left Arm   Patient Position: Sitting   Cuff Size: Adult Regular   Pulse: 70   Resp: 16   Temp: 97.9  F (36.6  C)   TempSrc: Oral     Wt Readings from Last 3 Encounters:   04/04/19 153 lb 6 oz (69.6 kg)   02/14/19 151 lb 9.6 oz (68.8 kg)   10/01/18 147 lb 2 oz (66.7 kg)     There is no height or weight on file to calculate BMI.    PHYSICAL EXAM:  GENERAL: Pleasant, well-appearing patient in no acute distress.   HEENT: Pupils equal round reactive to light. Sclerae and conjunctivae clear. Oropharynx is clear with moist mucous membranes.  TMs clear bilaterally.  Slight maxillary sinus pain to palpation.  NECK: Supple without lymphadenopathy  CARDIOVASCULAR: Heart regular rate and rhythm without murmur normal S1-S2   LUNGS: Clear to auscultation bilaterally, good air movement throughout   EXTREMITIES Warm and well-perfused without edema.   NEURO: Alert and oriented. Grossly nonfocal.   PSYCHIATRIC: Presents on time and well groomed. Normal speech and thought content. Full affect. No abnormal movements or behaviors noted.      MEDICATIONS:  Current Outpatient Medications   Medication Sig Dispense Refill     aspirin 81 mg chewable tablet Chew 81 mg daily.       azelastine (ASTELIN) 137 mcg (0.1 %) nasal spray Use in each nostril as directed 30 mL 12     cholecalciferol, vitamin D3, 1,000 unit tablet Take 1,000 Units by mouth daily.       coenzyme Q10 100 mg/mL Liqd Take by mouth.       docosahexanoic acid/epa (FISH OIL ORAL) Take 2 tablets by mouth daily.       GINKGO BILOBA ORAL Take by mouth.       ibuprofen (ADVIL,MOTRIN) 800 MG tablet TAKE 1 TABLET BY MOUTH 3 TIMES A DAY AS NEEDED 90 tablet 6     lisinopril (PRINIVIL,ZESTRIL) 10 MG tablet Take 10 mg by mouth daily.       meloxicam (MOBIC) 15 MG tablet Take 0.5-1 tablets (7.5-15 mg total) by mouth daily as needed for pain. 60 tablet 2     NON FORMULARY Bone up- calcium        CHELSEY'S WORT ORAL Take 1 tablet by mouth daily.       APPLE CIDER VINEGAR ORAL Take 1 tablet by mouth daily.       diphenhydrAMINE-acetaminophen (TYLENOL PM EXTRA STRENGTH)  mg Tab Take 1 tablet by mouth at bedtime as needed.       TURMERIC, BULK, MISC Use As Directed.       No current facility-administered medications for this visit.

## 2021-05-29 NOTE — TELEPHONE ENCOUNTER
Triage call:     Patient is calling with 10 days of symptoms of a sinus infection.     Vacation for one week- recent travel on plane. Intermittent headaches. Ear pain on the plane. Congested noted as well prior to plane ride. Cough is improving but congested and mucous is yellow in color, runny nose. No fever. Low energy level.     Drinking adequate fluids, tried allergy meds- no relief, other OTC combo cold medications- no relief.     Triaged to be seen in the office today or tomorrow- patient declines an appointment at this time. Is requesting an antibiotic sent to her pharmacy to help with her symptoms. PCP please advise.     *Ok to leave a detailed message upon call back    Pharmacy and allergies verified.     Ameena Raymond RN [facility] Triage/Med Refill 6/5/2019 11:20 AM    Reason for Disposition    Sinus congestion (pressure, fullness) present > 10 days    Protocols used: SINUS PAIN AND CONGESTION-A-[place]

## 2021-05-29 NOTE — TELEPHONE ENCOUNTER
So sorry, we do not like to do antibiotics without a visit.  If she is on my chart she could do an E visit otherwise should do an office visit.  Thank you.

## 2021-05-31 ENCOUNTER — RECORDS - HEALTHEAST (OUTPATIENT)
Dept: ADMINISTRATIVE | Facility: CLINIC | Age: 66
End: 2021-05-31

## 2021-05-31 VITALS — HEIGHT: 65 IN | BODY MASS INDEX: 24.83 KG/M2 | WEIGHT: 149 LBS

## 2021-06-01 VITALS — HEIGHT: 65 IN | WEIGHT: 151 LBS | BODY MASS INDEX: 25.16 KG/M2

## 2021-06-01 VITALS — BODY MASS INDEX: 24.49 KG/M2 | HEIGHT: 65 IN | WEIGHT: 147 LBS

## 2021-06-01 VITALS — BODY MASS INDEX: 24.2 KG/M2 | WEIGHT: 145.4 LBS

## 2021-06-01 VITALS — BODY MASS INDEX: 25.16 KG/M2 | WEIGHT: 151 LBS | HEIGHT: 65 IN

## 2021-06-01 VITALS — WEIGHT: 147 LBS | HEIGHT: 65 IN | BODY MASS INDEX: 24.49 KG/M2

## 2021-06-02 VITALS — WEIGHT: 147.13 LBS | BODY MASS INDEX: 24.51 KG/M2 | HEIGHT: 65 IN

## 2021-06-02 VITALS — WEIGHT: 151.6 LBS | BODY MASS INDEX: 25.26 KG/M2 | HEIGHT: 65 IN

## 2021-06-02 VITALS — BODY MASS INDEX: 25.77 KG/M2 | WEIGHT: 153.38 LBS

## 2021-06-02 NOTE — PROGRESS NOTES
Assessment and Plan:     1. Routine general medical examination at a health care facility     2. Change in stool  Ambulatory referral for Colonoscopy   3. Adenomatous polyp of colon, unspecified part of colon-Multiple adenomatous  Ambulatory referral for Colonoscopy   4. Menopause  DXA Bone Density Scan   5. Visit for screening mammogram  Mammo Screening Bilateral   6. History of basal cell carcinoma     7. Osteopenia  Vitamin D, Total (25-Hydroxy)   8. Essential hypertension  Comprehensive Metabolic Panel    HM2(CBC w/o Differential)   9. Mixed hyperlipidemia  Lipid [place]   10. Family history of hypothyroidism  Thyroid [place]   11. Fall, initial encounter  CANCELED: XR Ribs Bilateral W PA Chest     PT for balance if needed, not at this time.     Nasal saline or netti pot with sterile water 1-2 times per day.    Astelin nasal spray.    Antihistamine as needed.    Monitor dry cough, let us know if too bothersome and we can change you away from Lisnopril.    Sees Dermatology yearly, to set apt.    She sees gynecology for Paps and pelvic.    The patient's current medical problems were reviewed.    I have had an Advance Directives discussion with the patient.  The following health maintenance schedule was reviewed with the patient and provided in printed form in the after visit summary:   Health Maintenance   Topic Date Due     HEPATITIS C SCREENING  NA     INFLUENZA VACCINE RULE BASED (1) Today     MEDICARE ANNUAL WELLNESS VISIT  Yearly     PNEUMOCOCCAL IMMUNIZATION 65+ LOW/MEDIUM RISK (2 of 2 - PPSV23) Today     TD 18+ HE  01/26/2020, today     DXA SCAN  12/07/2019, ordered     FALL RISK ASSESSMENT  Discussed     MAMMOGRAM  12/13/2019, ordered     COLONOSCOPY  Ordered for Nico.     ADVANCE CARE PLANNING  On file     ZOSTER VACCINES  Completed      Paps-with GYN    Subjective:   Chief Complaint: Calin Aguilar is an 64 y.o. female here for an Annual Wellness visit.   HPI: Patient here today for annual wellness.       Rib injury-she was on vacation recently slipped and fell in the shower hitting her right rib cage.  Injury happened 3 days ago.  She is not short of breath but does have some pain with taking a deep breath.  She would be agreeable to an x-ray.    History of skin cancer-she does see dermatology routinely.    Cough-she thinks this may be related to allergies but I did tell her its possibly related to the lisinopril.  It does not bother her much and she like to stay on the lisinopril.    Multiple adenomatous colon polyps-patient did have a colonoscopy in January that showed 7 adenomatous colon polyps.  It was recommended she have another colonoscopy in 3 years but she feels it would be warranted in a year from when it was done.  I do feel that this is reasonable.  She does feel that her stool has changed recently has become thinner and more pointed.  No other concerns.    Review of Systems:  Please see above.  The rest of the review of systems are negative for all systems.    Patient Care Team:  Amarilis Delgado MD as PCP - General (Family Medicine)  Amarilis Delgado MD as Assigned PCP     Patient Active Problem List   Diagnosis     Osteopenia     Insomnia     Osteoarthritis     Anxiety Disorder NOS     Hyperlipidemia     Constipation     Trigger finger     Adenomatous polyp of colon, unspecified part of colon-Multiple adenomatous     Neck pain     Arm pain     History of skin cancer     History of basal cell carcinoma     HTN (hypertension)     Past Medical History:   Diagnosis Date     Colon polyps      Dietary Calcium Deficiency     Created by Conversion      Impaired fasting glucose     Created by Conversion      Pain During Urination (Dysuria)     Created by Conversion      Polyps Of The Sigmoid Colon     Created by Conversion      Unable To Restrain Bowel Movement     Created by Conversion      Urinary Tract Infection     Created by Conversion       Past Surgical History:   Procedure Laterality Date      HAND SURGERY Left about     For 1st CMC OA     HAND SURGERY Right 2019    For 1st CMC OA and carpal tunnel     OR COLPOSCOPY,CERVIX W/ADJ VAGINA,W/BX      Description: Colposcopy Cervix With Biopsy(S);  Proc Date: 1997;  Comments: mild dysplasia with koilocytosis, and endocervical polyp     OR LIGATE FALLOPIAN TUBE      Description: Tubal Ligation;  Recorded: 2009;      Family History   Problem Relation Age of Onset     Breast cancer Mother 58     Hypothyroidism Mother      Osteoporosis Mother      Hypertension Mother      Depression Mother      Hypertension Father      Heart disease Father      Stroke Paternal Aunt      Depression Paternal Grandfather      Depression Maternal Grandfather      Anuerysm Paternal Uncle      Diabetes Paternal Grandmother      Hypertension Sister       Social History     Socioeconomic History     Marital status:      Spouse name: Not on file     Number of children: Not on file     Years of education: Not on file     Highest education level: Not on file   Occupational History     Not on file   Social Needs     Financial resource strain: Not on file     Food insecurity:     Worry: Not on file     Inability: Not on file     Transportation needs:     Medical: Not on file     Non-medical: Not on file   Tobacco Use     Smoking status: Former Smoker     Last attempt to quit: 10/19/1987     Years since quittin.9     Smokeless tobacco: Never Used     Tobacco comment: 30 years ago   Substance and Sexual Activity     Alcohol use: Yes     Alcohol/week: 3.0 standard drinks     Types: 3 Glasses of wine per week     Drug use: No     Sexual activity: Not on file   Lifestyle     Physical activity:     Days per week: Not on file     Minutes per session: Not on file     Stress: Not on file   Relationships     Social connections:     Talks on phone: Not on file     Gets together: Not on file     Attends Anabaptist service: Not on file     Active member of club or  "organization: Not on file     Attends meetings of clubs or organizations: Not on file     Relationship status: Not on file     Intimate partner violence:     Fear of current or ex partner: Not on file     Emotionally abused: Not on file     Physically abused: Not on file     Forced sexual activity: Not on file   Other Topics Concern     Not on file   Social History Narrative            The 10-year ASCVD risk score (Nacho GREEN Jr., et al., 2013) is: 7%      Values used to calculate the score:        Age: 63 years        Sex: Female        Is Non- : No        Diabetic: No        Tobacco smoker: No        Systolic Blood Pressure: 126 mmHg        Is BP treated: Yes        HDL Cholesterol: 68 mg/dL        Total Cholesterol: 220 mg/dL      Current Outpatient Medications   Medication Sig Dispense Refill     aspirin 81 mg chewable tablet Chew 81 mg daily.       azelastine (ASTELIN) 137 mcg (0.1 %) nasal spray Use in each nostril as directed 30 mL 12     cholecalciferol, vitamin D3, 1,000 unit tablet Take 1,000 Units by mouth daily.       docosahexanoic acid/epa (FISH OIL ORAL) Take 2 tablets by mouth daily.       GINKGO BILOBA ORAL Take by mouth.       ibuprofen (ADVIL,MOTRIN) 800 MG tablet TAKE 1 TABLET BY MOUTH 3 TIMES A DAY AS NEEDED 90 tablet 6     lisinopril (PRINIVIL,ZESTRIL) 10 MG tablet Take 1 tablet (10 mg total) by mouth daily. 90 tablet 0     meloxicam (MOBIC) 15 MG tablet Take 0.5-1 tablets (7.5-15 mg total) by mouth daily as needed for pain. 60 tablet 2     NON FORMULARY Bone up- calcium       CHELSEY'S WORT ORAL Take 1 tablet by mouth daily.       coenzyme Q10 100 mg/mL Liqd Take by mouth.       No current facility-administered medications for this visit.       Objective:   Vital Signs:   Visit Vitals  /84 (Patient Site: Left Arm, Patient Position: Sitting, Cuff Size: Adult Regular)   Pulse 67   Temp 97.2  F (36.2  C) (Oral)   Resp 16   Ht 5' 4.5\" (1.638 m)   LMP 06/30/2010   BMI " "25.92 kg/m         VisionScreening:  No exam data present     PHYSICAL EXAM  /84 (Patient Site: Left Arm, Patient Position: Sitting, Cuff Size: Adult Regular)   Pulse 67   Temp 97.2  F (36.2  C) (Oral)   Resp 16   Ht 5' 4.5\" (1.638 m)   LMP 06/30/2010   BMI 25.92 kg/m      General Appearance:    Alert, cooperative, no distress, appears stated age   Head:    Normocephalic, without obvious abnormality, atraumatic   Eyes:    PERRL, conjunctiva/corneas clear, EOM's intact, fundi     benign, both eyes   Ears:    Normal TM's and external ear canals, both ears   Nose:   Nares normal, septum midline, mucosa normal, no drainage     or sinus tenderness   Throat:   Lips, mucosa, and tongue normal; teeth and gums normal   Neck:   Supple, symmetrical, trachea midline, no adenopathy;     thyroid:  no enlargement/tenderness/nodules; no carotid    bruit or JVD   Back:     Symmetric, no curvature, ROM normal, no CVA tenderness   Lungs:     Clear to auscultation bilaterally, respirations unlabored   Chest Wall:    No tenderness or deformity    Heart:    Regular rate and rhythm, S1 and S2 normal, no murmur, rub    or gallop   Breast Exam:    No tenderness, masses, or nipple abnormality   Abdomen:     Soft, non-tender, bowel sounds active all four quadrants,     no masses, no organomegaly           Extremities:   Extremities normal, atraumatic, no cyanosis or edema   Pulses:   2+ and symmetric all extremities   Skin:   Skin color, texture, turgor normal, no rashes or lesions   Lymph nodes:   Cervical, supraclavicular, and axillary nodes normal   Neurologic:   CNII-XII intact, normal strength, sensation and reflexes     throughout       Assessment Results 10/10/2019   Activities of Daily Living No help needed   Instrumental Activities of Daily Living No help needed   Get Up and Go Score -   Mini Cog Total Score 4   Some recent data might be hidden     A Mini-Cog score of 0-2 suggests the possibility of dementia, score of 3-5 " suggests no dementia    Identified Health Risks:     She is at risk for falling and has been provided with information to reduce the risk of falling at home.  Patient's advanced directive was discussed and I am comfortable with the patient's wishes.

## 2021-06-02 NOTE — TELEPHONE ENCOUNTER
Refill Approved    Rx renewed per Medication Renewal Policy. Medication was last renewed on 8/9/19  OV 10/10/19.    Nickie Peters, [facility] [place] Connection Triage/Med Refill 11/3/2019     Requested Prescriptions   Pending Prescriptions Disp Refills     lisinopril (PRINIVIL,ZESTRIL) 10 MG tablet [Pharmacy Med Name: LISINOPRIL 10 MG TABLET] 90 tablet 0     Sig: TAKE 1 TABLET BY MOUTH EVERY DAY       [facility] Inhibitors Refill Protocol Passed - 11/3/2019 12:20 PM        Passed - PCP or prescribing provider visit in past 12 months       Last office visit with prescriber/PCP: Visit date not found OR same dept: 6/6/2019 Azeb Avery MD OR same specialty: 6/6/2019 Azeb Avery MD  Last physical: Visit date not found Last MTM visit: Visit date not found   Next visit within 3 mo: Visit date not found  Next physical within 3 mo: Visit date not found  Prescriber OR PCP: Kaleigh Morton DO  Last diagnosis associated with med order: 1. Essential hypertension  - lisinopril (PRINIVIL,ZESTRIL) 10 MG tablet [Pharmacy Med Name: LISINOPRIL 10 MG TABLET]; TAKE 1 TABLET BY MOUTH EVERY DAY  Dispense: 90 tablet; Refill: 0    If protocol passes may refill for 12 months if within 3 months of last provider visit (or a total of 15 months).             Passed - Serum Potassium in past 12 months     Lab Results   Component Value Date    Potassium 4.4 10/10/2019             Passed - Blood pressure filed in past 12 months     BP Readings from Last 1 Encounters:   10/10/19 134/84             Passed - Serum Creatinine in past 12 months     Creatinine   Date Value Ref Range Status   10/10/2019 0.88 0.60 - 1.10 mg/dL Final

## 2021-06-03 ENCOUNTER — RECORDS - HEALTHEAST (OUTPATIENT)
Dept: ADMINISTRATIVE | Facility: CLINIC | Age: 66
End: 2021-06-03

## 2021-06-03 VITALS
HEART RATE: 67 BPM | TEMPERATURE: 97.2 F | DIASTOLIC BLOOD PRESSURE: 84 MMHG | RESPIRATION RATE: 16 BRPM | HEIGHT: 65 IN | SYSTOLIC BLOOD PRESSURE: 134 MMHG | BODY MASS INDEX: 25.92 KG/M2

## 2021-06-04 ENCOUNTER — COMMUNICATION - HEALTHEAST (OUTPATIENT)
Dept: FAMILY MEDICINE | Facility: CLINIC | Age: 66
End: 2021-06-04

## 2021-06-04 DIAGNOSIS — I10 ESSENTIAL HYPERTENSION: ICD-10-CM

## 2021-06-04 RX ORDER — LOSARTAN POTASSIUM 25 MG/1
TABLET ORAL
Qty: 90 TABLET | Refills: 3 | Status: SHIPPED | OUTPATIENT
Start: 2021-06-04 | End: 2022-05-06

## 2021-06-05 VITALS
TEMPERATURE: 97.5 F | SYSTOLIC BLOOD PRESSURE: 120 MMHG | BODY MASS INDEX: 25.72 KG/M2 | RESPIRATION RATE: 16 BRPM | HEIGHT: 65 IN | DIASTOLIC BLOOD PRESSURE: 83 MMHG | HEART RATE: 80 BPM | WEIGHT: 154.38 LBS

## 2021-06-05 VITALS — HEART RATE: 67 BPM | SYSTOLIC BLOOD PRESSURE: 136 MMHG | DIASTOLIC BLOOD PRESSURE: 86 MMHG

## 2021-06-07 NOTE — PROGRESS NOTES
Follow Up Blood Pressure Check    Calin Aguilar is a 65 y.o. female recommended to follow up for blood pressure check by Amarilis Delgado MD. Anihypertensive medications and adherence were verified: Yes.     Reason for visit: Bp check     Medication change at last visit: None    Today's Vitals:   Vitals:    04/13/20 0936   BP: 119/74   Patient Site: Left Arm   Patient Position: Sitting   Cuff Size: Adult Regular   Pulse: 68         Lowest blood pressure today is less than 140/90 and they deny signs or symptoms of new onset: severe headache, fatigue, confusion, vision changes, chest pain, pounding in the chest, neck, ears, irregular heartbeat, difficulty breathing and blood in the urine.  Please inform patient of his/her blood pressure today.  If they are asymptomatic, the patient is to continue current medications.  This message will be routed to their provider, and they will be notified if a change in medication is recommended.         Azeb Dodson    Current Outpatient Medications   Medication Sig Dispense Refill     aspirin 81 mg chewable tablet Chew 81 mg daily.       azelastine (ASTELIN) 137 mcg (0.1 %) nasal spray Use in each nostril as directed 30 mL 12     cholecalciferol, vitamin D3, 1,000 unit tablet Take 1,000 Units by mouth daily.       coenzyme Q10 100 mg/mL Liqd Take by mouth.       docosahexanoic acid/epa (FISH OIL ORAL) Take 2 tablets by mouth daily.       GINKGO BILOBA ORAL Take by mouth.       ibuprofen (ADVIL,MOTRIN) 800 MG tablet TAKE 1 TABLET BY MOUTH 3 TIMES A DAY AS NEEDED 90 tablet 6     lisinopril (PRINIVIL,ZESTRIL) 10 MG tablet Take 1 tablet (10 mg total) by mouth daily. 90 tablet 3     meloxicam (MOBIC) 15 MG tablet Take 0.5-1 tablets (7.5-15 mg total) by mouth daily as needed for pain. 60 tablet 2     NON FORMULARY Bone up- calcium       CHELSEY'S WORT ORAL Take 1 tablet by mouth daily.       No current facility-administered medications for this visit.

## 2021-06-07 NOTE — PROGRESS NOTES
Follow Up Blood Pressure Check    Calin Aguilar is a 65 y.o. female recommended to follow up for blood pressure check by Amarilis Delgado MD. Anihypertensive medications and adherence were verified: Yes.     Reason for visit: Elevated blood pressure     Medication change at last visit: Stop lisinopril and start Start losartan 25 mg daily         Today's Vitals:   Vitals:    04/23/20 1007   BP: 120/82   Patient Site: Left Arm   Patient Position: Sitting   Cuff Size: Adult Regular   Pulse: 71           Lowest blood pressure today is 120/82 and they deny signs or symptoms of new onset: severe headache, fatigue, confusion, vision changes, chest pain, pounding in the chest, neck, ears, irregular heartbeat, difficulty breathing and blood in the urine.  Please inform patient of his/her blood pressure today.  If they are asymptomatic, the patient is to continue current medications.  This message will be routed to their provider, and they will be notified if a change in medication is recommended.      Latesha Pavon    Current Outpatient Medications   Medication Sig Dispense Refill     aspirin 81 mg chewable tablet Chew 81 mg daily.       azelastine (ASTELIN) 137 mcg (0.1 %) nasal spray Use in each nostril as directed 30 mL 12     cholecalciferol, vitamin D3, 1,000 unit tablet Take 1,000 Units by mouth daily.       docosahexanoic acid/epa (FISH OIL ORAL) Take 2 tablets by mouth daily.       GINKGO BILOBA ORAL Take by mouth.       losartan (COZAAR) 25 MG tablet Take 1 tablet (25 mg total) by mouth daily. 30 tablet 1     NON FORMULARY Bone up- calcium       No current facility-administered medications for this visit.

## 2021-06-07 NOTE — PATIENT INSTRUCTIONS - HE
Stop lisinopril    Start losartan 25 mg daily    BP check in one week    Check potassium and kidney function in 3 weeks via lab appt.

## 2021-06-07 NOTE — PROGRESS NOTES
"Calin Aguilar is a 65 y.o. female who is being evaluated via a billable telephone visit.      The patient has been notified of following:     \"This telephone visit will be conducted via a call between you and your physician/provider. We have found that certain health care needs can be provided without the need for a physical exam.  This service lets us provide the care you need with a short phone conversation.  If a prescription is necessary we can send it directly to your pharmacy.  If lab work is needed we can place an order for that and you can then stop by our lab to have the test done at a later time.    Telephone visits are billed at different rates depending on your insurance coverage. During this emergency period, for some insurers they may be billed the same as an in-person visit.  Please reach out to your insurance provider with any questions.    If during the course of the call the physician/provider feels a telephone visit is not appropriate, you will not be charged for this service.\"    Patient has given verbal consent to a Telephone visit? Yes    Patient would like to receive their AVS by AVS Preference: Tory.    HPI: \" my blood pressure is good\".   Some dry cough.  occas some nausea and occas dizziness.  BP Readings from Last 3 Encounters:   04/13/20 119/74   10/10/19 134/84   06/06/19 123/85      Has been on lisinopril for 1 1/2 years.  Dry cough for this period of time.     Assessment/Plan:  1. Essential hypertension    - losartan (COZAAR) 25 MG tablet; Take 1 tablet (25 mg total) by mouth daily.  Dispense: 30 tablet; Refill: 1    PLAN:   Stop lisinopril    Start losartan 25 mg daily    BP check in one week    Check potassium and kidney function in 3 weeks via lab appt.         Phone call duration:  13 minutes       "

## 2021-06-07 NOTE — PROGRESS NOTES
BP looks good, continue same meds and follow-up twice yearly, one for med check and one for physical.  Since the blood pressure check note states that if the blood pressure is normal to continue same medications and if this was already communicated to the patient no callback needed.

## 2021-06-08 NOTE — TELEPHONE ENCOUNTER
Refill Approved    Rx renewed per Medication Renewal Policy. Medication was last renewed on 4/15/2020. Virtual visit 4/15/2020     Ameena Raymond, [facility] Triage/Med Refill 6/9/2020     Requested Prescriptions   Pending Prescriptions Disp Refills     losartan (COZAAR) 25 MG tablet [Pharmacy Med Name: LOSARTAN POTASSIUM 25 MG TAB] 30 tablet 1     Sig: TAKE 1 TABLET BY MOUTH EVERY DAY       Angiotensin Receptor Blocker Protocol Passed - 6/5/2020 12:33 AM        Passed - PCP or prescribing provider visit in past 12 months       Last office visit with prescriber/PCP: Visit date not found OR same dept: 6/6/2019 Azeb Avery MD OR same specialty: 6/6/2019 Azeb Avery MD  Last physical: Visit date not found Last MTM visit: Visit date not found   Next visit within 3 mo: Visit date not found  Next physical within 3 mo: Visit date not found  Prescriber OR PCP: Kodak Friend MD  Last diagnosis associated with med order: 1. Essential hypertension  - losartan (COZAAR) 25 MG tablet [Pharmacy Med Name: LOSARTAN POTASSIUM 25 MG TAB]; TAKE 1 TABLET BY MOUTH EVERY DAY  Dispense: 30 tablet; Refill: 1    If protocol passes may refill for 12 months if within 3 months of last provider visit (or a total of 15 months).             Passed - Serum potassium within the past 12 months     Lab Results   Component Value Date    Potassium 4.0 05/06/2020             Passed - Blood pressure filed in past 12 months     BP Readings from Last 1 Encounters:   04/23/20 120/82             Passed - Serum creatinine within the past 12 months     Creatinine   Date Value Ref Range Status   05/06/2020 0.78 0.60 - 1.10 mg/dL Final

## 2021-06-13 NOTE — TELEPHONE ENCOUNTER
Refill Approved    Rx renewed per Medication Renewal Policy. Medication was last renewed on 2/18/19.    Carmen Farris, [facility] Triage/Med Refill 12/10/2020     Requested Prescriptions   Pending Prescriptions Disp Refills     azelastine (ASTELIN) 137 mcg (0.1 %) nasal spray 30 mL 12     Sig: Use in each nostril as directed       Nasal Spray Protocol Passed - 12/9/2020 11:02 AM        Passed - Patient has had office visit/physical in last 1 year     Last office visit with prescriber/PCP: 4/4/2019 Amarilis Delgado MD OR same dept: Visit date not found OR same specialty: 6/6/2019 Azeb Avery MD Last physical: 10/10/2019 Last MTM visit: Visit date not found    Next visit within 3 mo: Visit date not found  Next physical within 3 mo: Visit date not found  Prescriber OR PCP: Amarilis Delgado MD  Last diagnosis associated with med order: 1. Chronic rhinitis  - azelastine (ASTELIN) 137 mcg (0.1 %) nasal spray; Use in each nostril as directed  Dispense: 30 mL; Refill: 12

## 2021-06-13 NOTE — PROGRESS NOTES
Assessment and Plan:     1. Encounter for screening for cardiovascular disorders  US Abdominal Aorta   2. Adenomatous polyp of colon, unspecified part of colon-Multiple adenomatous     3. Essential hypertension  Comprehensive Metabolic Panel    HM2(CBC w/o Differential)   4. Mixed hyperlipidemia  Lipid Cascade   5. History of basal cell carcinoma     6. Osteopenia     7. Overweight  Thyroid Brookesmith    Amb Referral to Bariatric Dietician   8. Vitamin D deficiency  Vitamin D, Total (25-Hydroxy)   9. Essential (primary) hypertension   Thyroid Cascade     Ordered abdominal aortic ultrasound to screen for aneurysm.    Recommend 3 dairy servings a day or calcium with vitamin D twice a day with food.    Dermatology at least yearly for full body skin checks.    Bariatric dietician consult to help with weight loss.    Please set nurse appointment in  6 months for blood pressure check.  Patient has been advised of split billing requirements and indicates understanding: Yes          The patient's current medical problems were reviewed.    I have had an Advance Directives discussion with the patient.  The following health maintenance schedule was reviewed with the patient and provided in printed form in the after visit summary:   Health Maintenance   Topic Date Due     MEDICARE ANNUAL WELLNESS VISIT  Today     FALL RISK ASSESSMENT  Discussed     MAMMOGRAM  Planned in Feb.     LIPID  Today     ADVANCE CARE PLANNING  On file     TD 18+ HE  10/10/2029     COLORECTAL CANCER SCREENING  01/16/2023, with polyps     DEXA SCAN  Due Dec. 2021     Pneumococcal Vaccine: 65+ Years  Completed     INFLUENZA VACCINE RULE BASED  Completed     ZOSTER VACCINES  Completed     Pneumococcal Vaccine: Pediatrics (0 to 5 Years) and At-Risk Patients (6 to 64 Years)  Aged Out     HEPATITIS B VACCINES  Completed     HEPATITIS C SCREENING  Discontinued      Pap-Due in 2 years and can do here if you wish.  She had Pap and pelvic done at Gyn in January  2020.    Subjective:   Chief Complaint: Calin Aguilar is an 65 y.o. female here for an Annual Wellness visit.   HPI:     Hypertension:  Had cough and lighthededness and nausea on Lisinorpil.  Changed to Losaartat in April and symptpms improved.    History of skin cancer:  Sees dermatology.    She has no concerns today.    Review of Systems:     Please see above.  The rest of the review of systems are negative for all systems.    Patient Care Team:  Amarilis Delgado MD as PCP - General (Family Medicine)  Amarilis Delgado MD as Assigned PCP     Patient Active Problem List   Diagnosis     Osteopenia     Insomnia     Osteoarthritis     Anxiety Disorder NOS     Hyperlipidemia     Constipation     Trigger finger     Adenomatous polyp of colon, unspecified part of colon-Multiple adenomatous     Neck pain     Arm pain     History of skin cancer     History of basal cell carcinoma     HTN (hypertension)     Past Medical History:   Diagnosis Date     Colon polyps      Dietary Calcium Deficiency     Created by Conversion      Impaired fasting glucose     Created by Conversion      Pain During Urination (Dysuria)     Created by Conversion      Polyps Of The Sigmoid Colon     Created by Conversion      Unable To Restrain Bowel Movement     Created by Conversion      Urinary Tract Infection     Created by Conversion       Past Surgical History:   Procedure Laterality Date     HAND SURGERY Left about 2010    For 1st [facility] OA     HAND SURGERY Right 02/2019    For 1st [facility] OA and carpal tunnel     AL COLPOSCOPY,CERVIX W/ADJ VAGINA,W/BX      Description: Colposcopy Cervix With Biopsy(S);  Proc Date: 03/28/1997;  Comments: mild dysplasia with koilocytosis, and endocervical polyp     AL LIGATE FALLOPIAN TUBE      Description: Tubal Ligation;  Recorded: 01/02/2009;      Family History   Problem Relation Age of Onset     Breast cancer Mother 58     Hypothyroidism Mother      Osteoporosis Mother      Hypertension Mother      Depression  Mother      Hypertension Father      Heart disease Father      Stroke Paternal Aunt      Depression Paternal Grandfather      Depression Maternal Grandfather      Anuerysm Paternal Uncle      Diabetes Paternal Grandmother      Hypertension Sister       Social History     Socioeconomic History     Marital status:      Spouse name: Not on file     Number of children: Not on file     Years of education: Not on file     Highest education level: Not on file   Occupational History     Not on file   Social Needs     Financial resource strain: Not on file     Food insecurity     Worry: Not on file     Inability: Not on file     Transportation needs     Medical: Not on file     Non-medical: Not on file   Tobacco Use     Smoking status: Former Smoker     Quit date: 10/19/1987     Years since quittin.1     Smokeless tobacco: Never Used     Tobacco comment: 30 years ago   Substance and Sexual Activity     Alcohol use: Yes     Alcohol/week: 3.0 standard drinks     Types: 3 Glasses of wine per week     Drug use: No     Sexual activity: Not on file   Lifestyle     Physical activity     Days per week: Not on file     Minutes per session: Not on file     Stress: Not on file   Relationships     Social connections     Talks on phone: Not on file     Gets together: Not on file     Attends Restorationist service: Not on file     Active member of club or organization: Not on file     Attends meetings of clubs or organizations: Not on file     Relationship status: Not on file     Intimate partner violence     Fear of current or ex partner: Not on file     Emotionally abused: Not on file     Physically abused: Not on file     Forced sexual activity: Not on file   Other Topics Concern     Not on file   Social History Narrative            The 10-year ASCVD risk score (Nacho GREEN Jr., et al., 2013) is: 7%      Values used to calculate the score:        Age: 63 years        Sex: Female        Is Non- : No         "Diabetic: No        Tobacco smoker: No        Systolic Blood Pressure: 126 mmHg        Is BP treated: Yes        HDL Cholesterol: 68 mg/dL        Total Cholesterol: 220 mg/dL      Current Outpatient Medications   Medication Sig Dispense Refill     ascorbic acid (CAHNCE-C ORAL) Take by mouth.       aspirin 81 mg chewable tablet Chew 81 mg daily.       azelastine (ASTELIN) 137 mcg (0.1 %) nasal spray Use in each nostril as directed 30 mL 4     cholecalciferol, vitamin D3, 1,000 unit tablet Take 1,000 Units by mouth daily.       docosahexanoic acid/epa (FISH OIL ORAL) Take 2 tablets by mouth daily.       losartan (COZAAR) 25 MG tablet TAKE 1 TABLET BY MOUTH EVERY DAY 90 tablet 3     NON FORMULARY Bone up- calcium       No current facility-administered medications for this visit.       Objective:   Vital Signs:   Visit Vitals  /83 (Patient Site: Left Arm, Patient Position: Sitting, Cuff Size: Adult Regular)   Pulse 80   Temp 97.5  F (36.4  C) (Oral)   Resp 16   Ht 5' 4.5\" (1.638 m)   Wt 154 lb 6 oz (70 kg)   LMP 06/30/2010   BMI 26.09 kg/m           VisionScreening:  No exam data present     PHYSICAL EXAM  /83 (Patient Site: Left Arm, Patient Position: Sitting, Cuff Size: Adult Regular)   Pulse 80   Temp 97.5  F (36.4  C) (Oral)   Resp 16   Ht 5' 4.5\" (1.638 m)   Wt 154 lb 6 oz (70 kg)   LMP 06/30/2010   BMI 26.09 kg/m      General Appearance:    Alert, cooperative, no distress, appears stated age   Head:    Normocephalic, without obvious abnormality, atraumatic   Eyes:    PERRL, conjunctiva/corneas clear, EOM's intact, fundi     benign, both eyes   Ears:    Normal TM's and external ear canals, both ears   Nose:   Nares normal, septum midline, mucosa normal, no drainage     or sinus tenderness   Throat:   Lips, mucosa, and tongue normal; teeth and gums normal   Neck:   Supple, symmetrical, trachea midline, no adenopathy;     thyroid:  no enlargement/tenderness/nodules; no carotid    bruit or JVD   Back: "     Symmetric, no curvature, ROM normal, no CVA tenderness   Lungs:     Clear to auscultation bilaterally, respirations unlabored   Chest Wall:    No tenderness or deformity    Heart:    Regular rate and rhythm, S1 and S2 normal, no murmur, rub    or gallop   Breast Exam:    No tenderness, masses, or nipple abnormality   Abdomen:     Soft, non-tender, bowel sounds active all four quadrants,     no masses, no organomegaly           Extremities:   Extremities normal, atraumatic, no cyanosis or edema   Pulses:   2+ and symmetric all extremities   Skin:   Skin color, texture, turgor normal, no rashes or lesions   Lymph nodes:   Cervical, supraclavicular, and axillary nodes normal   Neurologic:   CNII-XII intact, normal strength, sensation and reflexes     throughout       Assessment Results 12/17/2020   Activities of Daily Living No help needed   Instrumental Activities of Daily Living No help needed   Get Up and Go Score -   Mini Cog Total Score 4   Some recent data might be hidden     A Mini-Cog score of 0-2 suggests the possibility of dementia, score of 3-5 suggests no dementia    Identified Health Risks:     Patient's advanced directive was discussed and I am comfortable with the patient's wishes.

## 2021-06-13 NOTE — PROGRESS NOTES
Assessment/Plan:  1. Routine general medical examination at a health care facility     2. Need for vaccination  Influenza, Seasonal,Quad Inj, 36+ MOS   3. Screening for breast cancer     4. Osteopenia  DXA Bone Density Scan    Vitamin D, Total (25-Hydroxy)   5. Screening for malignant neoplasm of breast     6. Encounter for screening mammogram for malignant neoplasm of breast  Mammo Screening Bilateral   7. Prehypertension  Renal Function Profile    HM2(CBC w/o Differential)   8. Actinic keratosis     9. Colon polyps     10. Neck pain  Ambulatory referral to Spine Care   11. Arm pain  Ambulatory referral to Spine Care   12. Hyperlipidemia  Lipid Cascade       Patient is a 62 y.o. female here for physical exam. See health maintenance section below. Labs as ordered.      Please set up a fasting lab appointment.     Set a nurse appointment for a blood pressure cuff, and bring your blood pressure cuff so that you can compare the two.     Hepatitis B shot # 1 today, # 2 in 1 month, # 3 in 6 months.    Set an appointment for the [facility].  The referral has been made, and you may set the appointment for now or for after you turn 63. [phone]     Colonoscopy is due in December 2018.    Upcoming vaccinations: Prevnar 13 due at age 65 and Pneumovax 23 due at age 64.     Nasal saline or netti pot with sterile water 1-2 times per day. Astelin: 1-2 sprays per nostril up to twice daily.     Recommend 3 dairy servings a day or calcium with vitamin D twice a day with food.    You want to keep BP Systolic under 140 mmHg and Diastolic under 90 mmHg.     Recommend up to 30 g of fiber per day.  Could add on Colace 100-400 mg daily as needed for constipation.  Could add MiraLAX nightly as directed if needed.    Bowel therapy if you wish.      HPI    Chief Complaint   Patient presents with     Annual Exam     physical no PAP.  pt not fasting       Health Maintenance   Topic Date Due     PAP SMEAR  At GYN      INFLUENZA VACCINE RULE BASED (1) Today     MAMMOGRAM  Order placed     COLONOSCOPY  12/23/2018     TD 18+ HE  01/26/2020     ADVANCE DIRECTIVES DISCUSSED WITH PATIENT  Has packet     TDAP ADULT ONE TIME DOSE  Completed     ZOSTER VACCINE  Completed        Patient Active Problem List   Diagnosis     Osteopenia     Insomnia     Osteoarthritis     Hematuria     Anxiety Disorder NOS     Hyperlipidemia     Constipation     Trigger finger     Basal cell carcinoma     History of colon cancer     Current Outpatient Prescriptions   Medication Sig     fluticasone (FLONASE) 50 mcg/actuation nasal spray USE 2 SPRAYS IN EACH NOSTRIL ONCE DAILY     ibuprofen (ADVIL,MOTRIN) 800 MG tablet TAKE 1 TABLET BY MOUTH 3 TIMES A DAY AS NEEDED     azithromycin (ZITHROMAX) 250 MG tablet 2 pills on day 1, then 1 pill a day for 4 days       Patient is a 62 y.o. female presents for a physical exam.    The following portions of the patient's history were reviewed and updated as appropriate: allergies, current medications, past family history, past medical history, past social history, past surgical history and problem list.    Left arm pain:  She had a fall down a flight of steps 4 years ago, which involved her extending her left arm to try to break her fall. Last year, she started seeing a chiropractor.  She said the chiropractic work helped, but the pain has come back. She is also concerned that her neck may have an issue.   She states that she gets seasonal allergies.  It mostly involves nasal congestion, but also sometimes includes rhinorrhea and a teary eye.   Health maintenance:  She received the Hepatitis B (#1) vaccine today. She has the paperwork for Advance Directive.  She sees dermatology annually.     Review of Systems  Pertinent items are noted in HPI.  Immunization History   Administered Date(s) Administered     DT (pediatric) 07/31/2000     Hep A, historic 06/08/2012, 08/14/2013     Influenza G4m2-51, 01/26/2010     Influenza,  "seasonal,quad inj 36+ mos 09/01/2016     Influenza, seasonal,quad inj 6-35 mos 01/26/2010, 01/18/2013, 09/18/2014     Tdap 11/21/2007, 01/26/2010     ZOSTER 11/04/2014     No results found for this or any previous visit (from the past 240 hour(s)).  I have had an Advance Directives discussion with the patient.  Objective:    /88 (Patient Site: Right Arm, Patient Position: Sitting, Cuff Size: Adult Regular)  Pulse 72  Temp 98.2  F (36.8  C) (Oral)   Resp 20  Ht 5' 4.57\" (1.64 m)  Wt 149 lb (67.6 kg)  LMP 06/30/2010  BMI 25.13 kg/m2      General Appearance:    Alert, cooperative, no distress, appears stated age   Head:    Normocephalic, without obvious abnormality, atraumatic   Eyes:    PERRL, conjunctiva/corneas clear, EOM's intact both eyes   Ears:    Normal TM's and external ear canals, both ears   Nose:   Nares normal, septum midline, mucosa normal, no drainage    or sinus tenderness   Throat:   Lips, mucosa, and tongue normal; teeth and gums normal   Neck:   Supple, symmetrical, trachea midline, no adenopathy;     thyroid:  no enlargement/tenderness/nodules; no carotid    bruit or JVD   Back:     Symmetric, no curvature, ROM normal, no CVA tenderness   Lungs:     Clear to auscultation bilaterally, respirations unlabored   Chest Wall:    No tenderness or deformity    Heart:    Regular rate and rhythm, S1 and S2 normal, no murmur, rub   or gallop       Abdomen:     Soft, non-tender, bowel sounds active all four quadrants,     no masses, no organomegaly           Extremities:   Extremities normal, atraumatic, no cyanosis or edema   Pulses:   2+ and symmetric all extremities   Skin:   Skin color, texture, turgor normal, no rashes or lesions   Lymph nodes:   Cervical, supraclavicular, and axillary nodes normal   Neurologic:   CNII-XII intact, normal strength, sensation and reflexes     throughout        ADDITIONAL HISTORY SUMMARIZED (2): Reviewed last office visit 12/5/2016 and physical 9/1/2016  DECISION " TO OBTAIN EXTRA INFORMATION (1): Reviewed mechanism reaction of astelin  RADIOLOGY TESTS (1): Bone density and mammogram ordered today.   LABS (1): None  MEDICINE TESTS (1): Reviewed last colonoscopy.  INDEPENDENT REVIEW (2 each): None.       The visit lasted a total of 40 minutes face to face with the patient. Over 50% of the time was spent counseling and educating the patient about health maintenance.      Daniella LAST, am scribing for and in the presence of, Dr. Amarilis Brown MD.      Amarilis LAST MD,  personally performed the services described in this documentation, as scribed by Daniella Kennedy in my presence, and it is both accurate and complete.    Total Data Points:  5

## 2021-06-13 NOTE — PATIENT INSTRUCTIONS - HE
Ordered abdominal aortic ultrasound to screen for aneurysm.    Recommend 3 dairy servings a day or calcium with vitamin D twice a day with food.    Dermatology at least yearly for full body skin checks.    Bariatric dietician consult to help with weight loss.    Please set nurse appointment in  6 months for blood pressure check.    Advance Directive  Patient s advance directive was discussed and I am comfortable with the patient s wishes.  Patient Education   Personalized Prevention Plan  You are due for the preventive services outlined below.  Your care team is available to assist you in scheduling these services.  If you have already completed any of these items, please share that information with your care team to update in your medical record.          Health Maintenance   Topic Date Due     MEDICARE ANNUAL WELLNESS VISIT  Today     FALL RISK ASSESSMENT  Discussed     MAMMOGRAM  Planned in Feb.     LIPID  Today     ADVANCE CARE PLANNING  On file     TD 18+ HE  10/10/2029     COLORECTAL CANCER SCREENING  01/16/2023, with polyps     DEXA SCAN  Due Dec. 2021     Pneumococcal Vaccine: 65+ Years  Completed     INFLUENZA VACCINE RULE BASED  Completed     ZOSTER VACCINES  Completed     Pneumococcal Vaccine: Pediatrics (0 to 5 Years) and At-Risk Patients (6 to 64 Years)  Aged Out     HEPATITIS B VACCINES  Completed     HEPATITIS C SCREENING  Discontinued      Pap-Due in 2 years and can do here if you wish

## 2021-06-14 NOTE — TELEPHONE ENCOUNTER
Incoming fax from pharmacy. Patient requesting ibuprofen 800 mg. Med is not on current med list. Please advise.

## 2021-06-14 NOTE — PROGRESS NOTES
I met with Calin Aguilar at the request of Amarilis Delgado MD to recheck her blood pressure.  Blood pressure medications on the MAR were reviewed with patient.    Patient has taken all medications as per usual regimen: N/A  Patient reports tolerating them without any issues or concerns: N/A    Vitals:    11/17/17 0857 11/17/17 0914   BP: 138/84 112/80   Patient Site: Left Arm Left Arm   Patient Position: Sitting Sitting   Cuff Size: Adult Regular Adult Regular   Pulse: 76        After 5 minutes, the patient's blood pressure was < 140/90, the previous encounter was reviewed, recorded blood pressure below 140/90.  Patient was discharged and the note will be sent to the provider for final review.

## 2021-06-16 PROBLEM — Z85.828 HISTORY OF BASAL CELL CARCINOMA: Status: ACTIVE | Noted: 2018-10-01

## 2021-06-16 PROBLEM — M79.603 ARM PAIN: Status: ACTIVE | Noted: 2017-10-19

## 2021-06-16 PROBLEM — D12.6 ADENOMATOUS POLYP OF COLON, UNSPECIFIED PART OF COLON: Status: ACTIVE | Noted: 2017-10-19

## 2021-06-16 PROBLEM — M54.2 NECK PAIN: Status: ACTIVE | Noted: 2017-10-19

## 2021-06-16 NOTE — PROGRESS NOTES
Assessment/Plan:      Diagnoses and all orders for this visit:    Shoulder pain  -     OPS US Large Joint Injection Unilateral; Standing  -     OPS US Large Joint Injection Unilateral    Other orders  -     methylPREDNISolone acetate injection (DEPO-MEDROL); as needed.  -     lidocaine (PF) 10 mg/mL (1 %) injection (XYLOCAINE-MPF); as needed.        Assessment: Very pleasant otherwise healthy 62-year-old female with a history of left thumb surgery with:    1. Left shoulder pain after a fall 3 years ago.    She has moderate osteoarthritis of the left glenohumeral joint along with tendinopathy and partial thickness rotator cuff tearing.      Discussion:    1.  I discussed the MRI findings with her today.  She is here for potential injection in the left glenohumeral joint under ultrasound.  We discussed options of injection along with therapy.  We discussed conservative management and referral to orthopedics as an option.  2.  She would like to proceed with left glenohumeral joint injection today under ultrasound guidance.  Please see attached procedure note.  3.  She has not done any significant formal physical therapy for the left shoulder.  Will add formal physical therapy program for the left glenohumeral joint stabilization parascapular strengthening.  4.  Follow-up with me in 2-4 weeks        It was our pleasure caring for your patient today, if there any questions or concerns please do not hesitate to contact us.      Subjective:   Patient ID: Calin Aguilar is a 63 y.o. female.    History of Present Illness: Patient presents for evaluation of left shoulder pain.  Continues to have a numbing pain in the left shoulder on the left arm to the elbow.  No change since last visit.  Present for several years after a fall.  Has not done formal physical therapy but does home exercises with bar classes.  Pain is a 2/10 today 8/10 at worst.  Feels she has decreased range of motion of the left shoulder and really has pain with  reaching behind her.  Has had MRI since last visit.      Imaging: MRI images and report personally reviewed and discussed with the patient.  This shows moderate joint effusion and moderate articular cartilage loss of the left glenohumeral joint.  There is also partial thickness rotator cuff tearing a potential labral tear.    Review of Systems: No numbness, tingling or weakness.  No bowel or bladder incontinence.  No headaches, dizziness, nausea, vomiting, blurred vision or balance deficits.  No fevers chills sweats recent illnesses or antibiotics.    Past Medical History:   Diagnosis Date     Colon polyps      Dietary Calcium Deficiency     Created by Conversion      Impaired fasting glucose     Created by Conversion      Pain During Urination (Dysuria)     Created by Conversion      Polyps Of The Sigmoid Colon     Created by Conversion      Unable To Restrain Bowel Movement     Created by Conversion      Urinary Tract Infection     Created by Conversion        The following portions of the patient's history were reviewed and updated as appropriate: allergies, current medications, past family history, past medical history, past social history, past surgical history and problem list.      Objective:   Physical Exam:    Vitals:    03/21/18 1426   BP: 130/90   Pulse: 72   Temp: 97.8  F (36.6  C)       General: Alert and oriented with normal affect. Attention, knowledge, memory, and language are intact. No acute distress.   Eyes: Sclerae are clear.  Respirations: Unlabored. CV: No lower extremity edema.   Gait:  Nonantalgic  Decreased internal rotation left shoulder with pain.  Full external rotation.  Appears to have normal muscle strength at the major muscle groups of the upper extremities.      Procedure Note:    After discussing the risks and benefits of a left glenohumeral joint injection under ultrasound guidance, informed consent was obtained. The patient and physician agreed on the injection site prior to the  procedure.   With the patient seated, from posterior lateral approach, The left shoulder region was surveyed with the ultrasound unit to identify the target.  The skin was marked and prepped with chloraprep.  Skin wheal and local anesthesia was made with 25-gauge 1.5 inch needle and 0.5 mL's of 1% lidocaine.  Under direct ultrasound visualization, a 22-gauge 3.5 inch needle was used to inject 3 milliliters of injectate containing 2 milliliters of 1% lidocaine and 1 milliliter containing 40 mg of depo-medrol after aspiration was negative for heme. The patient tolerated the procedure well and there no immediate complications.    Preporcedure pain: 8/10  Poste procedure pain: 0/10

## 2021-06-16 NOTE — PROGRESS NOTES
Assessment/Plan:      Diagnoses and all orders for this visit:    Cervicalgia  -     Ambulatory referral to Physical Therapy    Cervical spondylosis  -     Ambulatory referral to Physical Therapy    Left shoulder pain  -     MR Shoulder Without Contrast Left; Future; Expected date: 3/9/18  -     Ambulatory referral to Physical Therapy  -     MR Shoulder Without Contrast Left; Standing  -     MR Shoulder Without Contrast Left    Lumbar spine pain  -     XR Lumbar Spine Flex and Ext 2 or 3 VWS; Future; Expected date: 3/9/18  -     Ambulatory referral to Physical Therapy  -     XR Lumbar Spine Flex and Ext 2 or 3 VWS; Standing  -     XR Lumbar Spine Flex and Ext 2 or 3 VWS        Assessment: Very pleasant otherwise healthy 62-year-old female with a history of left thumb surgery with:    1.  Chronic cervical spine pain mid cervical spine with some catching.  Worsening over time most consistent with cervical spondylosis and facet arthropathy.  She does have degenerative changes on plain films of at C5-6 and 6-7 with some anterior osteophytes.  2.  Left shoulder pain after a fall 3 years ago.  Symptoms are consistent with chronic humeral joint osteoarthritis and potential rotator cuff impingement.  3.  Chronic lumbar spine pain flared recently most consistent with spondylosis facet arthropathy.  Also some myofascial pain.    4.  Right hand numbness and tingling with right first MCP pain.  Consistent with osteoarthritis and likely component of carpal tunnel syndrome.    Discussion:    1. I discussed the diagnosis and treatment options.  We discussed all of her pain complaints and likely related to degenerative changes of the facets along with potential myofascial pain in the left shoulder pain likely rotator cuff impingement along with potential glenohumeral osteoarthritis.  2.  Plain films of lumbar spine to evaluate for degenerative changes particularly at L4-5 and L5-S1.  3.  MRI left shoulder to evaluate.  4.  Start  physical therapy for cervical and lumbar strengthening with trial of [facility] program.  5.  Would recommend carpal tunnel splint.  If symptoms do not improve always can consider EMG down the road.  6.  Follow-up 1 month and by phone with results of imaging when available.    It was our pleasure caring for your patient today, if there any questions or concerns please do not hesitate to contact us.      Subjective:   Patient ID: Calin Aguilar is a 62 y.o. female.    History of Present Illness: Patient presents at the request of Dr. Delgado for evaluation of multiple pain complaints.  Most significant pain cervical spine pain with left shoulder and arm pain.  She has had at least a 20 year history of neck pain with no real trauma.  She did have somebody fall on her while she was laying down several years ago.  The neck pain is intermittent nagging ache throughout the mid cervical spine bilaterally.  Feels a catch in her neck at times with turning her head.  She has developed some numbness and tingling in the right hand recently when she falls asleep and some pain in the right first MCP joint.  Has had previous surgery in the left MCP joint.  Otherwise no radiation of the pain down from her neck or numbness and tingling.    Over the past 3 years she did fall.  She fell her left shoulder and has some left shoulder pain with some pain down the lateral arm to the lateral elbow.  This has improved at times.  Tells me she saw orthopedics.  [place] that she strained a tendon.  She has been doing some more exercises recently and that may have helped the arm pain some.    She also has lumbar spine pain for years and recently has had a flare in pain over the right side into the right iliac crest.  None to the left side.  These episodes last 5-20 minutes.  Worse with any standing walking better with sitting.  No radiation down the legs numbness tingling or weakness.    Has not had any recent physical therapy for any issues but some PT in  the past.  Chiropractic in the past as well and she does have x-rays from chiropractor here today from last fall.  Chiropractic has not been that helpful.      Imaging: Plain film cervical spine personally reviewed.  No report available.  This is from chiropractor.  AP and lateral.  Shows degenerative disc height loss C5-6 and 6-7 with anterior osteophytes.    Review of Systems: Complains of back pain joint pain.  No bowel or bladder incontinence rashes red swollen joints.  No issues with sleep.  Remainder of 12 point review systems negative unless listed above.    Past Medical History:   Diagnosis Date     Colon polyps      Dietary Calcium Deficiency     Created by Conversion      Impaired fasting glucose     Created by Conversion      Pain During Urination (Dysuria)     Created by Conversion      Polyps Of The Sigmoid Colon     Created by Conversion      Unable To Restrain Bowel Movement     Created by Conversion      Urinary Tract Infection     Created by Conversion        The following portions of the patient's history were reviewed and updated as appropriate: allergies, current medications, past family history, past medical history, past social history, past surgical history and problem list.      WHO 5: 24    NDI Score: 8      Objective:   Physical Exam:    Vitals:    03/09/18 1001   BP: 157/86   Pulse: 79       General:  Well-appearing female in no acute distress.  Pleasant,   cooperative, and interactive throughout the examination and interview.  CV: No lower extremity edema.  Lymphatics: No cervical lymphadenopathy palpated.  Eyes: sclera clear.  Skin: No rashes or lesions seen.  Respirations unlabored.  MSK: Gait is normal.  Able to heel-toe walk without difficulty.    Negative Romberg.  Spine: normal AP curves of the C, T, and L spine.  Mild decreased lumbar extension with pain.  Mild lumbar flexion decrease.  Mild decreased rotation cervical spine bilaterally.  Palpation: Tenderness to palpation  overright L4-5 and L5-S1 paraspinals.  pain with facet loading.  Mild tenderness over the cervical paraspinals peer  Extremities: Mild decreased range of motion left worse than right of internal rotation with reproduction of symptoms.  Pain on Ramirez on the left negative right.  Full abduction bilaterally.  Full range of motion of the   elbows, and wrists with no effusions or tenderness to palpation.  No weakness with arm drop, empty can, and Speed's test bilaterally.  Pain with [place]'s and mild with empty can on the left    Full range of motion of the hips, knees, and ankles from a seated position with no effusions or tenderness to palpation.  Negative Tinel's at the wrists and elbows.  Negative Phalen's.    Neurologic exam: Mental status: Patient is alert and oriented with normal affect.  Attention, knowledge, memory, and language are intact.  Normal coordination throughout the examination.  Reflexes are 2+ and symmetric biceps, triceps, brachioradialis, patellar, and 0Achilles with down-going toes and Negative Sandra's.  Sensation is intact to light touch throughout the upper and lower extremities bilaterally.  Manual muscle testing reveals 5 out of 5 strength in the shoulder abductors, elbow   flexors/extensors, wrist extensors, interosseous, and finger flexors; 5 out of 5   in the hip flexors, knee flexors/extensors, ankle plantar flexors, ankle   dorsiflexors, and EHL.  Normal muscle bulk and tone.  Negative   Spurling's test bilaterally.  Negative seated  straight leg raise bilaterally.

## 2021-06-16 NOTE — PROGRESS NOTES
Name: Calin Aguilar  : 1955   MRN: [id number]    ASSESSMENT & PLAN:   Calin Aguilar is a 66 y.o. female presenting today for follow up leg wound.     1. Wound infection  Calin should complete Keflex as prescribed.  Infection has responded well to antibiotics.  Wound does not currently appear infected.  Recommend keeping wound moist with Vaseline emollient and covered with gauze or Band-Aid.  She should continue with warm soapy water washes 1-2 times daily.  She endorses using hydrogen peroxide, recommend she discontinue this.  Follow-up if any concerns for worsening signs of infection again (purulent drainage, spreading redness, increasing pain, fevers, chills).  Anticipate wound to heal over in the next 1 to 2 weeks.  If wound healing is significantly delayed, return for further evaluation and consideration for referral to wound clinic.  With slough formation, she may need debridement of the wound to expedite healing.     Return in about 8 months (around 2021) for annual physical exam, sooner as needed.    Jazmine Hidalgo DO  [facility]        Calin Aguilar is a 66 y.o. female presenting to discuss the following:     CC:   Chief Complaint   Patient presents with     Follow-up     [facility]-left foot cut       HPI:  Calin was evaluated in the urgent care on 4/3/21 for an infected foot wound. She was treated with Keflex. Slipped getting out of the shower and hit the door frame on 3/28, minimal bleeding. Started to swell. Went to ED about 6 days later. Symptoms are improving.     HEALTH MAINTENANCE:   - up to date  - COVID vaccine: just had second COVID shot on Tuesday.    ROS:   No fevers or chills, no drainage, redness and swelling improving.     PMH:   Patient Active Problem List   Diagnosis     Osteopenia     Insomnia     Osteoarthritis     Anxiety Disorder NOS     Hyperlipidemia     Constipation     Trigger finger     Adenomatous polyp of colon, unspecified part of  colon-Multiple adenomatous     Neck pain     Arm pain     History of basal cell carcinoma     HTN (hypertension)       Past Medical History:   Diagnosis Date     Colon polyps      Dietary Calcium Deficiency     Created by Conversion      Impaired fasting glucose     Created by Conversion      Pain During Urination (Dysuria)     Created by Conversion      Polyps Of The Sigmoid Colon     Created by Conversion      Unable To Restrain Bowel Movement     Created by Conversion      Urinary Tract Infection     Created by Conversion        PSH:   Past Surgical History:   Procedure Laterality Date     HAND SURGERY Left about 2010    For 1st CMC OA     HAND SURGERY Right 02/2019    For 1st CMC OA and carpal tunnel     LA COLPOSCOPY,CERVIX W/ADJ VAGINA,W/BX      Description: Colposcopy Cervix With Biopsy(S);  Proc Date: 03/28/1997;  Comments: mild dysplasia with koilocytosis, and endocervical polyp     LA LIGATE FALLOPIAN TUBE      Description: Tubal Ligation;  Recorded: 01/02/2009;         MEDICATIONS:   Current Outpatient Medications on File Prior to Visit   Medication Sig Dispense Refill     aspirin 81 mg chewable tablet Chew 81 mg daily.       azelastine (ASTELIN) 137 mcg (0.1 %) nasal spray Use in each nostril as directed 30 mL 4     cholecalciferol, vitamin D3, 1,000 unit tablet Take 1,000 Units by mouth daily.       losartan (COZAAR) 25 MG tablet TAKE 1 TABLET BY MOUTH EVERY DAY 90 tablet 3     NON FORMULARY Bone up- calcium       vit A/vit C/vit E/zinc/copper (PRESERVISION AREDS ORAL) Take by mouth.       ascorbic acid (CHANCE-C ORAL) Take by mouth.       cephalexin (KEFLEX) 500 MG capsule        docosahexanoic acid/epa (FISH OIL ORAL) Take 2 tablets by mouth daily.       ibuprofen (ADVIL,MOTRIN) 800 MG tablet Take 1 tablet (800 mg total) by mouth every 8 (eight) hours as needed for pain. 90 tablet 3     No current facility-administered medications on file prior to visit.        ALLERGIES:  Allergies   Allergen Reactions      Codeine      Nausea     Lisinopril      Cough and lightheaded       PHYSICAL EXAM:   /86   Pulse 67   LMP 06/30/2010    GENERAL: Calin is a pleasant, well appearing female, in no acute distress.   HEART: Regular rate and rhythm, no murmurs.   LUNGS: Clear to auscultation bilaterally, unlabored.   DERM: There is a deep abrasion, measuring approximately 1/2 cm by 3cm along left anterior lower shin. There is slough developing on wound bed. No purulent drainage. Skin is pink immediately surrounding wound, does not extend beyond border of wound. No induration or warmth. Wound is covered with gauze square adhered by medical tape.    REVIEW OF PREVIOUS NOTES:   Review of prior external note(s) from - CareEverywhere information from [facility] on 4/3/21 reviewed

## 2021-06-17 NOTE — PROGRESS NOTES
[facility] Daily Progress     Patient Name: Calin Aguilar  Date: 2018  Visit #: 6  Referral Diagnosis: Chronic Neck, LBP and Shoulder pain  Referring provider: Dr. Andrei Rivers  Visit Diagnosis:     ICD-10-CM    1. Chronic bilateral low back pain without sciatica M54.5     G89.29    2. Chronic neck pain M54.2     G89.29    3. Chronic left shoulder pain M25.512     G89.29    4. Generalized muscle weakness M62.81    5. Abnormal posture R29.3          Assessment:     HEP/POC compliance is  fair .  Patient demonstrates understanding/independence with home program.     Pt has tolerated PT and MedX well. She has had no pain and rare headaches over recent weeks. She has shown improvements with her overall mobility and goal achievement. Pt is no longer having shoulder pain since injection as well. She believes that she is prepared to continue with her HEP independently at this time. Pt is appropriate to continue independently based on progress and independence. Pt encouraged to call with any questions or concerns. Chart will be on hold for 30 days.    Goal Status:  Patient will sit: 30 minutes;with less pain;with less difficultty;for other activity;in 6 weeks (Goal met)  Other Activity: reading  Pt will: be independent with HEP within 3 weeks. (Goal Met)  Pt will: be able to bend WFL and pain-free within 8 weeks. (Goal Met)  Pt will: increase lumbar and cervical max torque by 30# or more to demonstrate improved strength within 8 weeks. (Not assessed)  Pt will: be able to reach overhead without pain and WFL for ADLs within 6 weeks. (Goal Met)    Plan / Patient Education:     Continue with initial plan of care.  Progress with home program as tolerated.    Hold chart for up to 30 days in case patient returns to PT. Pt will be discharged at that time if she does not return.  Subjective:     Pain Ratin  Pt having no pain since last session. She feels that she is doing well and ready to continue on her own.  She shares that everything seems to be feeling well. The exercises have been helping.       Objective:   Patient is currently in the 2nd week of the MedX program.    Reviewed HEP, good technique, discussed continuing regularly    Occasional cues for pace with MedX DE    Exercises:  Exercise #1: scap sets: x10, 5 sec holds  Comment #1: LTRs: x10, HEP  Exercise #2: doorway pec stretch: HEP  Comment #2: Nustep x 5 minutes  Exercise #3: rotary torso: started to L, 28#, 90 seconds  Comment #3: rows: x15 GTB. HEP  Exercise #4: chin tucks: reviewed, HEP  Comment #4: Bridge: x10 with OTB, HEP  Exercise #5: shoulder extensions: x12 BTB     Cervical MedX Initial testing  (18) 4-week Re-test   AROM (full= 0-120  cervical) 21-90    Max Extension Torque  140#    Flex: ext ratio (ideal 1.4:1) 2.09:1        Lumbar MedX Initial testin18 4-week Re-test   AROM (full=  0-72  lumbar) 0-60 deg    Max Extension Torque  122#    Average Extension Torque 67#    Flex: ext ratio (ideal 1.4:1) 11.09:1      Treatment Today     TREATMENT MINUTES COMMENTS   Evaluation     Self-care/ Home management     Manual therapy     Neuromuscular Re-education     Therapeutic Activity     Therapeutic Exercises 26 See exercise and MedX flowsheets. Reviewed HEP.   Gait training     Modality__________________                Total 26    Blank areas are intentional and mean the treatment did not include these items.       Robinson Campos, PT, DPT  2018

## 2021-06-17 NOTE — PROGRESS NOTES
[facility] Daily Progress     Patient Name: Calin Aguilar  Date: 2018  Visit #: 4  Referral Diagnosis: Chronic Neck, LBP and Shoulder pain  Referring provider: Dr. Andrei Rivers  Visit Diagnosis:     ICD-10-CM    1. Chronic bilateral low back pain without sciatica M54.5     G89.29    2. Chronic neck pain M54.2     G89.29    3. Chronic left shoulder pain M25.512     G89.29    4. Generalized muscle weakness M62.81    5. Abnormal posture R29.3          Assessment:     HEP/POC compliance is  fair .  Patient demonstrates understanding/independence with home program.     Pt having no pain or headache today. She also has not had a headaches since last week. Pt was able to tolerate MedX DE for cervical and lumbar very well today. Pt would benefit from further core and postural strengthening to limit her pain and headaches. Shoulder also doing very well!    Goal Status:  Patient will sit: 30 minutes;with less pain;with less difficultty;for other activity;in 6 weeks  Other Activity: reading  Pt will: be independent with HEP within 3 weeks.  Pt will: be able to bend WFL and pain-free within 8 weeks.  Pt will: increase lumbar and cervical max torque by 30# or more to demonstrate improved strength within 8 weeks.  Pt will: be able to reach overhead without pain and WFL for ADLs within 6 weeks.    Plan / Patient Education:     Continue with initial plan of care.  Progress with home program as tolerated.    PLAN for next visit: continue MedX (L and C), rotary torso, postural and core progression, manual techniques as needed for headaches, review chin tucks  Subjective:     Pain Ratin  Pt shares that she is feeling good today. She continues to have a sinus infection, but she feels it is getting better. Her shoulder has also been doing very well.       Objective:   Patient is currently in the 1st week of the MedX program.    Exercises:  Exercise #1: scap sets: x10, 5 sec holds  Comment #1: LTRs: x10, HEP  Exercise  #2: doorway pec stretch: HEP  Comment #2: Nustep x 5 minutes  Exercise #3: rotary torso: started to L, 24#, 90 seconds  Comment #3: rows: x15 GTB. HEP  Exercise #4: chin tucks: x10, 5 sec holds, HEP     Cervical MedX Initial testing  (18) 4-week Re-test   AROM (full= 0-120  cervical) 21-90    Max Extension Torque  140#    Flex: ext ratio (ideal 1.4:1) 2.09:1        Lumbar MedX Initial testin18 4-week Re-test   AROM (full=  0-72  lumbar) 0-60 deg    Max Extension Torque  122#    Average Extension Torque 67#    Flex: ext ratio (ideal 1.4:1) 11.09:1      Began MedX DE on cervical and lumbar, occasional cues for pace    Mod and tactile cues with chin tucks    Exercises:  Exercise #1: scap sets: x10, 5 sec holds  Comment #1: LTRs: x10, HEP  Exercise #2: doorway pec stretch: HEP  Comment #2: Nustep x 5 minutes  Exercise #3: rotary torso: started to L, 24#, 90 seconds  Comment #3: rows: x15 GTB. HEP  Exercise #4: chin tucks: x10, 5 sec holds, HEP    Treatment Today     TREATMENT MINUTES COMMENTS   Evaluation     Self-care/ Home management     Manual therapy     Neuromuscular Re-education     Therapeutic Activity     Therapeutic Exercises 28 See exercise and MedX flowsheets.    Gait training     Modality__________________                Total 28    Blank areas are intentional and mean the treatment did not include these items.       Robinson Campos, PT, DPT  2018

## 2021-06-17 NOTE — PROGRESS NOTES
Assessment/Plan:      Diagnoses and all orders for this visit:    Glenohumeral arthritis, left    Tendinopathy of rotator cuff, left    Cervical spondylosis        Assessment:Very pleasant otherwise healthy 62-year-old female with a history of left thumb surgery with:     1. Left shoulder pain after a fall 3 years ago.    She has moderate osteoarthritis of the left glenohumeral joint along with tendinopathy and partial thickness rotator cuff tearing.  Doing well with physical therapy and after shoulder injection.  2.  Chronic cervical spine pain mid cervical spine with some catching.  Worsening over time most consistent with cervical spondylosis and facet arthropathy.  She does have degenerative changes on plain films of at C5-6 and 6-7 with some anterior osteophytes currently doing well with physical therapy.  3.  Some right thumb pain consistent with first CMC osteoarthritis        Discussion:    1. *Overall she is doing quite well with physical therapy.  She should continue her physical therapy exercises at home and include physical therapy.  2.  Continue to monitor left shoulder pain.  At this point I do not see the need for further injections at this time until/unless she has a pain flare.  3.  Can try turmeric over-the-counter for her osteoarthritis pain complaints.  4.  She does question about getting an injection in her right thumb.  At this point I would avoid further injections unless the pain flares.  She agrees with this plan.  5.  Follow-up with me as needed.      It was our pleasure caring for your patient today, if there any questions or concerns please do not hesitate to contact us.      Subjective:   Patient ID: Calin Aguilar is a 63 y.o. female.    History of Present Illness: Patient presents for follow-up of left shoulder pain.  Also with neck pain.  Both of these are doing quite well.  Pretty much no pain at all right now.  Is able to do her physical therapy exercises and is completing physical  therapy.  Did well with the injection and really has no complaints.    She does have ongoing right thumb pain at the base of the thumb which is an ongoing issue has had surgery on the left.  The right is painful at times but overall doing well is improved recently as she is not doing as much computer work.    Review of Systems: No numbness, tingling or weakness.  No bowel or bladder incontinence.  No headaches, dizziness, nausea, vomiting, blurred vision or balance deficits.  No fevers chills sweats recent illnesses per    Past Medical History:   Diagnosis Date     Colon polyps      Dietary Calcium Deficiency     Created by Conversion      Impaired fasting glucose     Created by Conversion      Pain During Urination (Dysuria)     Created by Conversion      Polyps Of The Sigmoid Colon     Created by Conversion      Unable To Restrain Bowel Movement     Created by Conversion      Urinary Tract Infection     Created by Conversion        The following portions of the patient's history were reviewed and updated as appropriate: allergies, current medications, past family history, past medical history, past social history, past surgical history and problem list.      Objective:   Physical Exam:    Vitals:    04/12/18 1122   BP: 124/75   Pulse: 84       General: Alert and oriented with normal affect. Attention, knowledge, memory, and language are intact. No acute distress.   Eyes: Sclerae are clear.  Respirations: Unlabored.  Gait:  Nonantalgic  Some enlargement of the right first CMC joint with no erythema.  Sensation is intact to light touch throughout the upper   extremities.  Reflexes are 2+ and symmetric in the biceps triceps and brachioradialis with negative Hoffmans.      Manual muscle testing reveals:  Right /Left out of 5     5/5 elbow flexors  5/5 elbow extensors  5/5 wrist extensors  5/5 finger flexors

## 2021-06-17 NOTE — PROGRESS NOTES
Assessment & Plan:  1. Acute sinusitis, recurrence not specified, unspecified location  Follow up with us if symptoms are not improving after the course of antibiotics is complete.    Take antibiotics with food.    Use Mucinex 12 hour twice daily and loosen mucus. Increase water intake.    Saline spray in both nostrils for moisture and to thin mucus.    Use a humidifier at home to moisten the air.    - azithromycin (ZITHROMAX) 250 MG tablet; Take 2 tablets on day one, and 1 tablet for the next four days.  Dispense: 6 tablet; Refill: 0      There are no Patient Instructions on file for this visit.    No orders of the defined types were placed in this encounter.    There are no discontinued medications.        Chief Complaint:   Chief Complaint   Patient presents with     Sinus Problem     sinus pressure and pain     Ear Pain     left ear pain and pressure     Fatigue     Cough       History of Present Illness:  Calin is a 63 y.o. female presenting to the clinic today with 10 days of ear pressure and worsening sinus pressure and pain. She has developed a bit of a cough in the past couple days which seems related to post nasal drip, otherwise cough is dry. She feels quite fatigued and run down. No known fevers, but has had on/off chills every few days. No known exposure to illness. Using Monie Selzer cold and sinus prn for symptoms. It is slightly helpful.    Review of Systems:  All other systems are negative except as noted above.     PFSH:  Reviewed and updated.     Tobacco Use:  History   Smoking Status     Former Smoker     Quit date: 10/19/1987   Smokeless Tobacco     Never Used     Comment: 30 years ago       Vitals:  Vitals:    04/05/18 1333   BP: 126/78   Patient Site: Right Arm   Patient Position: Sitting   Cuff Size: Adult Regular   Pulse: 92   Resp: 18   Temp: 98.7  F (37.1  C)   TempSrc: Oral   Weight: 145 lb 6.4 oz (66 kg)     Wt Readings from Last 3 Encounters:   04/05/18 145 lb 6.4 oz (66 kg)   03/21/18  151 lb (68.5 kg)   03/09/18 151 lb (68.5 kg)       Physical Exam:  Constitutional:  Reveals an alert, cooperative, 63 year old female in no acute distress.  Vitals:  Per nursing notes.  Eyes: PERRLA, funduscopic exam within normal limits.  HENT: TMs clear bilaterally,Oropharynx with erythema, clear posterior nasal drainage, no thrush. Neck supple,  thyroid not palpable. Nasal mucosa red, inflamed with increased rhinorrhea. Sinuses tender to palpation.   Cardiovascular:  Regular rate and rhythm without murmurs, rubs, or gallops. Carotids without bruits. Legs without edema.   Respiratory: Clear.  Respiratory effort normal.  Lymph: Anterior cervical lymphadenopathy present, Posterior cervical lymphadenopathy not present, lymph nodes nontender to palpation.   Psychiatric:  Mood appropriate, alert and oriented x3.    Data Reviewed:  Additional History from Old Records or Another Person Summarized (2 total): None.     Decision to Obtain Extra information (1 total): None.     Radiology Tests Summarized and Ordered (XRAY/CT/MRI/DXA) (1 total): None.    Labs Reviewed and Ordered (1 total):0    Medicine Tests Summarized and Ordered (EKG/ECHO/COLONOSCOPY/EGD) (1 total): None.    Independent Review of EKG or X-Ray (2 each): None.    The visit lasted a total of 20 minutes face to face with the patient. Over 50% of the time was spent counseling and educating the patient about plan of care.    Medications:  Current Outpatient Prescriptions   Medication Sig Dispense Refill     azelastine (ASTELIN) 137 mcg (0.1 %) nasal spray Use in each nostril as directed 30 mL 12     cholecalciferol, vitamin D3, 1,000 unit tablet Take 1,000 Units by mouth daily.       fluticasone (FLONASE) 50 mcg/actuation nasal spray USE 2 SPRAYS IN EACH NOSTRIL ONCE DAILY 16 g 3     ibuprofen (ADVIL,MOTRIN) 800 MG tablet TAKE 1 TABLET BY MOUTH 3 TIMES A DAY AS NEEDED 90 tablet 1     ibuprofen (ADVIL,MOTRIN) 800 MG tablet TAKE 1 TABLET BY MOUTH 3 TIMES A DAY AS  NEEDED 60 tablet 3     azithromycin (ZITHROMAX) 250 MG tablet Take 2 tablets on day one, and 1 tablet for the next four days. 6 tablet 0     No current facility-administered medications for this visit.        Total Data Points:     DIALLO Alfaro, CNP    This note has been dictated using voice recognition software. Any grammatical or context distortions are unintentional and inherent to the software

## 2021-06-17 NOTE — PROGRESS NOTES
[facility] Daily Progress     Patient Name: Calin Aguilar  Date: 2018--> 2 visits until 18  Visit #: 2  Referral Diagnosis: Chronic Neck, LBP and Shoulder pain  Referring provider: Dr. Andrei Rivers  Visit Diagnosis:     ICD-10-CM    1. Chronic bilateral low back pain without sciatica M54.5     G89.29    2. Chronic neck pain M54.2     G89.29    3. Chronic left shoulder pain M25.512     G89.29    4. Generalized muscle weakness M62.81    5. Abnormal posture R29.3          Assessment:   Patient tested on the cervical MedX this sessions and is testing well below the age matched norms. PT also initiated the cervical dynamic exercise. PT to test on the Lumbar MedX next session.  Patient is noting much improvement in her L shoulder pain since the injection  HEP/POC compliance is  fair .  Patient demonstrates understanding/independence with home program.    Goal Status:  Patient will sit: 30 minutes;with less pain;with less difficultty;for other activity;in 6 weeks  Other Activity: reading  Pt will: be independent with HEP within 3 weeks.  Pt will: be able to bend WFL and pain-free within 8 weeks.  Pt will: increase lumbar and cervical max torque by 30# or more to demonstrate improved strength within 8 weeks.  Pt will: be able to reach overhead without pain and WFL for ADLs within 6 weeks.    Plan / Patient Education:     Continue with initial plan of care.  Progress with home program as tolerated.    PLAN for next visit: Lumbar MedX, rotary torso  Subjective:     Pain Ratin  She is partially compliant with the home exercises due to being sick.  She is noting a lot of improvement in the shoulder pain from the injection.      Objective:   Patient is currently in the 1st week of the MedX program.  Exercises:  Exercise #1: scap sets: x10, 5 sec holds  Comment #1: LTRs: x15  Exercise #2: doorway pec stretch: x3 with 15 sec holds  Comment #2: Nustep x 4 minutes    Cervical MedX Initial testing  (18)  4-week Re-test   AROM (full= 0-120  cervical) 21-90    Max Extension Torque  140#    Flex: ext ratio (ideal 1.4:1) 2.09:1        Lumbar MedX Initial testing 4-week Re-test   AROM (full=  0-72  lumbar)     Max Extension Torque      Average Extension Torque     Flex: ext ratio (ideal 1.4:1)           Treatment Today     TREATMENT MINUTES COMMENTS   Evaluation     Self-care/ Home management     Manual therapy     Neuromuscular Re-education     Therapeutic Activity     Therapeutic Exercises 25 See flow sheet   Gait training     Modality__________________                Total 25    Blank areas are intentional and mean the treatment did not include these items.       Mita Carver  4/6/2018

## 2021-06-17 NOTE — PROGRESS NOTES
[facility] Daily Progress     Patient Name: Calin Aguilar  Date: 2018--> 2 visits until 18  Visit #: 3  Referral Diagnosis: Chronic Neck, LBP and Shoulder pain  Referring provider: Dr. Andrei Rivers  Visit Diagnosis:     ICD-10-CM    1. Chronic bilateral low back pain without sciatica M54.5     G89.29    2. Chronic neck pain M54.2     G89.29    3. Chronic left shoulder pain M25.512     G89.29    4. Generalized muscle weakness M62.81    5. Abnormal posture R29.3          Assessment:     HEP/POC compliance is  fair .  Patient demonstrates understanding/independence with home program.     Pt tolerating lumbar MedX testing today without increases in pain, but weakness is well below average. Limited ROM as well. Pt was able to perform DE, but cues needed for pace. No cervical MedX performed today due to time. Pt able to progress to rows for HEP today. She may benefit from further manual techniques if she continues to get headaches, but none today.    Goal Status:  Patient will sit: 30 minutes;with less pain;with less difficultty;for other activity;in 6 weeks  Other Activity: reading  Pt will: be independent with HEP within 3 weeks.  Pt will: be able to bend WFL and pain-free within 8 weeks.  Pt will: increase lumbar and cervical max torque by 30# or more to demonstrate improved strength within 8 weeks.  Pt will: be able to reach overhead without pain and WFL for ADLs within 6 weeks.    Plan / Patient Education:     Continue with initial plan of care.  Progress with home program as tolerated.    PLAN for next visit: continue MedX (L and C), rotary torso, postural and core progression, manual techniques as needed for headaches  Subjective:     Pain Ratin  Pt says that she had a headache after last session, but understanding that she is not used to doing as much with her neck.       Objective:   Patient is currently in the 1st week of the MedX program.    Exercises:  Exercise #1: scap sets: x10, 5 sec  holds  Comment #1: LTRs: x10, HEP  Exercise #2: doorway pec stretch: HEP  Comment #2: Nustep x 5 minutes  Exercise #3: rotary torso: started to R, 22#, 90 seconds  Comment #3: rows: x15 GTB. HEP     Cervical MedX Initial testing  (18) 4-week Re-test   AROM (full= 0-120  cervical) 21-90    Max Extension Torque  140#    Flex: ext ratio (ideal 1.4:1) 2.09:1        Lumbar MedX Initial testin18 4-week Re-test   AROM (full=  0-72  lumbar) 0-60 deg    Max Extension Torque  122#    Average Extension Torque 67#    Flex: ext ratio (ideal 1.4:1) 11.09:1      No cervical MedX performed today    Began rotary torso today    Min to mod cues for pace and technique with lumbar MedX DE    Exercises:  Exercise #1: scap sets: x10, 5 sec holds  Comment #1: LTRs: x10, HEP  Exercise #2: doorway pec stretch: HEP  Comment #2: Nustep x 5 minutes  Exercise #3: rotary torso: started to R, 22#, 90 seconds  Comment #3: rows: x15 GTB. HEP    Treatment Today     TREATMENT MINUTES COMMENTS   Evaluation     Self-care/ Home management     Manual therapy     Neuromuscular Re-education     Therapeutic Activity     Therapeutic Exercises 30 See exercise and MedX flowsheets.    Gait training     Modality__________________                Total 30    Blank areas are intentional and mean the treatment did not include these items.       Robinson Campos, PT, DPT  2018

## 2021-06-17 NOTE — PATIENT INSTRUCTIONS - HE
Patient Instructions by Amarilis Delgado MD at 10/10/2019 10:40 AM     Author: Amarilis Delgado MD Service: -- Author Type: Physician    Filed: 10/10/2019  7:08 PM Encounter Date: 10/10/2019 Status: Addendum    : Amarilis Delgado MD (Physician)    Related Notes: Original Note by Amarilis Delgado MD (Physician) filed at 10/10/2019 11:41 AM       PT for balance if needed, not at this time.     Nasal saline or netti pot with sterile water 1-2 times per day.    Astelin nasal spray.    Antihistamine as needed.    Monitor dry cough, let us know if too bothersome and we can change you away from Lisnopril.    Sees Dermatology yearly, to set apt.    She sees gynecology for Paps and pelvic.     Patient Education   Preventing Falls in the Home  As you get older, falls are more likely. Thats because your reaction time slows. Your muscles and joints may also get stiffer, making them less flexible. Illness, medications, and vision changes can also affect your balance. A fall could leave you unable to live on your own. To make your home safer, follow these tips:    Floors    Put nonskid pads under area rugs.    Remove throw rugs.    Replace worn floor coverings.    Tack carpets firmly to each step on carpeted stairs. Put nonskid strips on the edges of uncarpeted stairs.    Keep floors and stairs free of clutter and cords.    Arrange furniture so there are clear pathways.    Clean up any spills right away.    Bathrooms    Install grab bars in the tub or shower.    Apply nonskid strips or put a nonskid rubber mat in the tub or shower.    Sit on a bath chair to bathe.    Use bathmats with nonskid backing.    Lighting    Keep a flashlight in each room.    Put a nightlight along the pathway between the bedroom and the bathroom.    [id number] [employer]. [address] [place] [address], [place] [address]. All rights reserved. This information is not intended as a substitute for professional medical care.  Always follow your healthcare professional's instructions.           Advance Directive  Patients advance directive was discussed and I am comfortable with the patients wishes.  Patient Education   Personalized Prevention Plan  You are due for the preventive services outlined below.  Your care team is available to assist you in scheduling these services.  If you have already completed any of these items, please share that information with your care team to update in your medical record.        Health Maintenance   Topic Date Due   ? HEPATITIS C SCREENING  NA   ? INFLUENZA VACCINE RULE BASED (1) Today   ? MEDICARE ANNUAL WELLNESS VISIT  Yearly   ? PNEUMOCOCCAL IMMUNIZATION 65+ LOW/MEDIUM RISK (2 of 2 - PPSV23) Today   ? TD 18+ HE  01/26/2020, today   ? DXA SCAN  12/07/2019, ordered   ? FALL RISK ASSESSMENT  Discussed   ? MAMMOGRAM  12/13/2019, ordered   ? COLONOSCOPY  Ordered for Jan.   ? ADVANCE CARE PLANNING  On file   ? ZOSTER VACCINES  Completed      Paps-with GYN

## 2021-06-17 NOTE — PROGRESS NOTES
[facility] Daily Progress     Patient Name: Calin Aguilar  Date: 2018  Visit #: 5  Referral Diagnosis: Chronic Neck, LBP and Shoulder pain  Referring provider: Dr. Andrei Rivers  Visit Diagnosis:     ICD-10-CM    1. Chronic bilateral low back pain without sciatica M54.5     G89.29    2. Chronic neck pain M54.2     G89.29    3. Chronic left shoulder pain M25.512     G89.29    4. Generalized muscle weakness M62.81    5. Abnormal posture R29.3          Assessment:     HEP/POC compliance is  fair .  Patient demonstrates understanding/independence with home program.     Pt continues to have no pain or headaches today. Tolerating MedX more appropriately even with resistance increases. Pt requiring cues for chin tucks today, but compliant with HEP overall at home. Bridge added to HEP today.    Goal Status:  Patient will sit: 30 minutes;with less pain;with less difficultty;for other activity;in 6 weeks  Other Activity: reading  Pt will: be independent with HEP within 3 weeks.  Pt will: be able to bend WFL and pain-free within 8 weeks.  Pt will: increase lumbar and cervical max torque by 30# or more to demonstrate improved strength within 8 weeks.  Pt will: be able to reach overhead without pain and WFL for ADLs within 6 weeks.    Plan / Patient Education:     Continue with initial plan of care.  Progress with home program as tolerated.    PLAN for next visit: continue MedX (L and C), rotary torso, postural and core progression, manual techniques as needed for headaches, review bridge  Subjective:     Pain Ratin  Pt says that she is doing well. Pt not having any pain today. No reports of headaches as well since last session.      Objective:   Patient is currently in the 2nd week of the MedX program.    No pain or headaches during session    Exercises:  Exercise #1: scap sets: x10, 5 sec holds  Comment #1: LTRs: x10, HEP  Exercise #2: doorway pec stretch: HEP  Comment #2: Nustep x 5 minutes  Exercise #3:  rotary torso: started to R, 26#, 90 seconds  Comment #3: rows: x15 GTB. HEP  Exercise #4: chin tucks: reviewed, HEP  Comment #4: Bridge: x10, HEP     Cervical MedX Initial testing  (18) 4-week Re-test   AROM (full= 0-120  cervical) 21-90    Max Extension Torque  140#    Flex: ext ratio (ideal 1.4:1) 2.09:1        Lumbar MedX Initial testin18 4-week Re-test   AROM (full=  0-72  lumbar) 0-60 deg    Max Extension Torque  122#    Average Extension Torque 67#    Flex: ext ratio (ideal 1.4:1) 11.09:1      Exercises:  Exercise #1: scap sets: x10, 5 sec holds  Comment #1: LTRs: x10, HEP  Exercise #2: doorway pec stretch: HEP  Comment #2: Nustep x 5 minutes  Exercise #3: rotary torso: started to R, 26#, 90 seconds  Comment #3: rows: x15 GTB. HEP  Exercise #4: chin tucks: reviewed, HEP  Comment #4: Bridge: x10, HEP    Treatment Today     TREATMENT MINUTES COMMENTS   Evaluation     Self-care/ Home management     Manual therapy     Neuromuscular Re-education     Therapeutic Activity     Therapeutic Exercises 27 See exercise and MedX flowsheets.    Gait training     Modality__________________                Total 27    Blank areas are intentional and mean the treatment did not include these items.       Robinson Campos, PT, DPT  2018

## 2021-06-18 NOTE — PROGRESS NOTES
[facility] Discharge Summary  Patient Name: Calin Aguilar  Date: 5/17/2018  Referral Diagnosis: back, neck, shoulder pain  Referring provider: Dr. Andrei Rivers  Visit Diagnosis:   1. Chronic bilateral low back pain without sciatica     2. Chronic neck pain     3. Chronic left shoulder pain     4. Generalized muscle weakness     5. Abnormal posture         Goals:  Patient will sit: 30 minutes;with less pain;with less difficultty;for other activity;in 6 weeks (Goal met)  Other Activity: reading  Pt will: be independent with HEP within 3 weeks. (Goal Met)  Pt will: be able to bend WFL and pain-free within 8 weeks. (Goal Met)  Pt will: increase lumbar and cervical max torque by 30# or more to demonstrate improved strength within 8 weeks. (Not assessed)  Pt will: be able to reach overhead without pain and WFL for ADLs within 6 weeks. (Goal Met)    Patient was seen for 6 visits from initial evaluation to 4/19/18 with 0 missed appointments.  The patient met goals and has demonstrated understanding of and independence in the home program for self-care, and progression to next steps.  She will initiate contact if questions or concerns arise.  Patient received a home program   No further therapy is required at this time.  See most recent note for more information.    Therapy will be discontinued at this time.  The patient will need a new referral to resume.    Thank you for your referral.  Robinson Campos, PT, DPT  5/17/2018  3:25 PM

## 2021-06-19 NOTE — PROGRESS NOTES
Assessment/Plan:      Diagnoses and all orders for this visit:    Shoulder pain  -     XR Shoulder Left 2 or More VWS; Future; Expected date: 8/9/18  -     meloxicam (MOBIC) 15 MG tablet; Take 0.5-1 tablets (7.5-15 mg total) by mouth daily as needed for pain.  Dispense: 60 tablet; Refill: 2  -     Ambulatory referral to Orthopedic Surgery    Glenohumeral arthritis, left  -     XR Shoulder Left 2 or More VWS; Future; Expected date: 8/9/18  -     meloxicam (MOBIC) 15 MG tablet; Take 0.5-1 tablets (7.5-15 mg total) by mouth daily as needed for pain.  Dispense: 60 tablet; Refill: 2  -     Ambulatory referral to Orthopedic Surgery    Tendinopathy of rotator cuff, left  -     meloxicam (MOBIC) 15 MG tablet; Take 0.5-1 tablets (7.5-15 mg total) by mouth daily as needed for pain.  Dispense: 60 tablet; Refill: 2        Assessment: Very pleasant otherwise healthy 62-year-old female with a history of left thumb surgery with:        1. Left shoulder pain after a fall 3 years ago.    She has moderate osteoarthritis of the left glenohumeral joint along with tendinopathy and partial thickness rotator cuff tearing.    Had done well after injection initially but has dramatically worsening pain over the past 1-2 months with decreased range of motion.  Question worsening osteoarthritis and joint effusion and a potentially worsening rotator cuff tear.    2.  She does have some chronic cervical spine pain.  At this point likely related to worsening myofascial pain from her left shoulder.      Discussion:    1.  I discussed the diagnoses and treatment options.  We discussed the options of medications, injections, further imaging orthopedic referral.  We discussed her MRI again from earlier this year.  2.  Trial meloxicam for flare of her pain.    3.  Plain films of the left shoulder to evaluate for progression of osteoarthritis or high riding humerus that would indicate worsening rotator cuff pathology.  3.  She would like to have  another opinion from orthopedics.  Will refer her to [facility].  4.  Can consider left glenohumeral joint injection under ultrasound.  After her x-rays will contact her by phone with results of imaging and further recommendations.          It was our pleasure caring for your patient today, if there any questions or concerns please do not hesitate to contact us.      Subjective:   Patient ID: Calin Aguilar is a 63 y.o. female.    History of Present Illness: Patient presents for evaluation of worsening left shoulder pain.  Was last seen in April of this year.  Was doing well after shoulder injection.  Then within a few weeks after the injection with no new trauma began having severe increase in left shoulder pain which has been worsening.  She has pain deep in the left shoulder radiating up to the cervical spine and down to the left elbow at times.  No numbness or tingling distally into the arm.  She feels weakness in her shoulder.  She has difficult time reaching especially and external rotation and extension of the left shoulder.  She is having very limited mobility with the shoulder and is doing more with her right arm.  She does take occasional anti-inflammatories.  No recent fevers chills or sweats.  She does feel weakness in the left shoulder itself.  Pain is a 4/10 today 10/10 at worst.    Imaging: MRI report and images were personally reviewed and discussed with the patient.    MRI of the left shoulder personally reviewed.  This shows moderate glenohumeral joint effusion.  There is aIntrasubstance rotator cuff tear.  There is labral tear.  There is rotator cuff tendinopathy.  Moderate glenohumeral joint osteoarthritis.    Review of Systems: Complains of left shoulder weakness.  No numbness, tingling  .  No bowel or bladder incontinence.  No headaches, dizziness, nausea, vomiting, blurred vision or balance deficits.    Past Medical History:   Diagnosis Date     Colon polyps      Dietary Calcium  Deficiency     Created by Conversion      Impaired fasting glucose     Created by Conversion      Pain During Urination (Dysuria)     Created by Conversion      Polyps Of The Sigmoid Colon     Created by Conversion      Unable To Restrain Bowel Movement     Created by Conversion      Urinary Tract Infection     Created by Conversion        The following portions of the patient's history were reviewed and updated as appropriate: allergies, current medications, past family history, past medical history, past social history, past surgical history and problem list.      Objective:   Physical Exam:    Vitals:    08/09/18 1017   BP: 141/86   Pulse: 84       General: Alert and oriented with normal affect. Attention, knowledge, memory, and language are intact. No acute distress.   Eyes: Sclerae are clear.  Respirations: Unlabored.    Gait:  Nonantalgic   decreased range of motion left shoulder and abduction to approximately 100 .  She has decreased internal and external rotation on the left secondary to pain.  She does have her arm give out with arm drop and empty can testing and pain with Speed's testing as well.  Sensation is intact to light touch throughout the upper   extremities.  Reflexes are 2+ and symmetric in the biceps triceps and brachioradialis with negative Hoffmans.      Manual muscle testing reveals:  Right /Left out of 5     5/5 elbow flexors  5/5 elbow extensors  5/5 wrist extensors  5/5 interosseus  5/5 finger flexors

## 2021-06-20 NOTE — LETTER
Letter by Kodak Friend MD at      Author: Kodak Friend MD Service: -- Author Type: --    Filed:  Encounter Date: 5/7/2020 Status: (Other)         Calin Aguilar  [address] [facility] [place] [address]             May 7, 2020         Dear Ms. Aguilar,    Below are the results from your recent visit:    Resulted Orders   Basic Metabolic Panel   Result Value Ref Range    Sodium 142 136 - 145 mmol/L    Potassium 4.0 3.5 - 5.0 mmol/L    Chloride 107 98 - 107 mmol/L    CO2 26 22 - 31 mmol/L    Anion Gap, Calculation 9 5 - 18 mmol/L    Glucose 100 70 - 125 mg/dL    Calcium 9.1 8.5 - 10.5 mg/dL    BUN 17 8 - 22 mg/dL    Creatinine 0.78 0.60 - 1.10 mg/dL    GFR MDRD Af Amer >60 >60 mL/min/1.73m2    GFR MDRD Non Af Amer >60 >60 mL/min/1.73m2    Narrative    Fasting Glucose reference range is 70-99 mg/dL per  American Diabetes Association (ADA) guidelines.       Hi Calin:  Your potassium and kidney function have remained NORMAL.    Please call with questions or contact us using [employer].    Sincerely,        Electronically signed by Kodak Friend MD

## 2021-06-20 NOTE — PROGRESS NOTES
Assessment and Plan:     1. Routine general medical examination at a health care facility     2. Screen for colon cancer     3. Encounter for screening for cardiovascular disorders  Electrocardiogram Perform and Read   4. Memory loss  Neuropsychological assessment   5. Hyperlipidemia  Lipid Cascade    Lipid Cascade   6. Encounter for screening mammogram for malignant neoplasm of breast  Mammo Screening Bilateral   7. Osteoarthritis  Ambulatory referral to Orthopedics   8. Prehypertension  Comprehensive Metabolic Panel    HM2(CBC w/o Differential)    Comprehensive Metabolic Panel    HM2(CBC w/o Differential)   9. Osteopenia  Vitamin D, Total (25-Hydroxy)    Vitamin D, Total (25-Hydroxy)   10. History of basal cell carcinoma  Ambulatory referral to Dermatology   11. HTN (hypertension)       Neurosych testing ordered to check for a memory concern, can check with insurance on coverage.    Set fasting lab apt.    Can use Meloxicam or Ibuprofen, not both in the same day. Use with food and sparingly.    Can follow-up with ortho as needed for shoulder pain. Continue with range of motion for shoulder.    Hepatis B shot #2 today, #3 in 4 months of after    If you are interested in the new shingles shot, Shingrix, please check with insurance for coverage either in clinic or at a pharmacy. It is a 2 shot series 2-6 months apart.     Want BP under 140/90    Ortho hand consult if wishing to discuss right CMC arthritis.    Sees Dermatology, recommend yearly.    Patient does not wish to start a blood pressure medication but will if blood pressures continue to be elevated.    Set nurse apt in 2 weeks for recheck of BP. If still elevated, we can consider a med and then follow-up with BP check in 2 weeks with a nurse and then office visit in 6 months.    Please start 81 mg Asprin daily.    The patient's current medical problems were reviewed.    I have had an Advance Directives discussion with the patient.  The following health  maintenance schedule was reviewed with the patient and provided in printed form in the after visit summary:   Health Maintenance   Topic Date Due     PNEUMOCOCCAL POLYSACCHARIDE VACCINE AGE 65 AND OVER  Due in 1 year     PNEUMOCOCCAL CONJUGATE VACCINE FOR ADULTS (PCV13 OR PREVNAR)  Today     INFLUENZA VACCINE RULE BASED (1) Today     COLONOSCOPY  12/23/2018, already set     FALL RISK ASSESSMENT  Steady     MAMMOGRAM  11/09/2018     DXA SCAN  12/07/2019     TD 18+ HE  01/26/2020     ADVANCE DIRECTIVES DISCUSSED WITH PATIENT  Has paperwork     TDAP ADULT ONE TIME DOSE  Completed     ZOSTER VACCINE  Completed      The 10-year ASCVD risk score (Nachochoco GREEN Jr, et al., 2013) is: 8.1%    Values used to calculate the score:      Age: 63 years      Sex: Female      Is Non- : No      Diabetic: No      Tobacco smoker: No      Systolic Blood Pressure: 158 mmHg      Is BP treated: No      HDL Cholesterol: 62 mg/dL      Total Cholesterol: 211 mg/dL    Pap-at GYN last year    Subjective:   Chief Complaint: Calin Aguilar is an 63 y.o. female here for a Welcome to Medicare visit.   HPI: 63-year-old female here for a welcome to Medicare visit.  She said she had a history of frozen shoulder on the left as well as a rotator cuff tear and osteoarthritis.  She did see Rodrgiuez O recently and had a steroid injection which has helped her range of motion and her pain.  She does a lot of exercise where she will continue with range of motion of the left shoulder.  She also did some physical therapy for some neck pain in the past with her spine clinic.  She admits that her blood pressure has been elevated at times.  She does have a family history of hypertension in her parents and sister.  She would really like to not be on a medication if possible.  Next concern is she is had arthritis in the first CMC joint of the hand bilaterally.  She had surgery on the left and it helped.  She has pain in the right base of the thumb and  wonders if she should have surgery or at least visit with a surgeon.  No other concerns.  She does get her Paps at gynecology.0    Review of Systems:  Please see above.  The rest of the review of systems are negative for all systems.    Patient Care Team:  Amarilis Delgado MD as PCP - General (Family Medicine)     Patient Active Problem List   Diagnosis     Osteopenia     Insomnia     Osteoarthritis     Anxiety Disorder NOS     Hyperlipidemia     Constipation     Trigger finger     Prehypertension     Colon polyps     Neck pain     Arm pain     History of skin cancer     History of basal cell carcinoma     HTN (hypertension)     Past Medical History:   Diagnosis Date     Colon polyps      Dietary Calcium Deficiency     Created by Conversion      Impaired fasting glucose     Created by Conversion      Pain During Urination (Dysuria)     Created by Conversion      Polyps Of The Sigmoid Colon     Created by Conversion      Unable To Restrain Bowel Movement     Created by Conversion      Urinary Tract Infection     Created by Conversion       Past Surgical History:   Procedure Laterality Date     HAND SURGERY Left about 2010    For 1st CMC OA     HI COLPOSCOPY,CERVIX W/ADJ VAGINA,W/BX      Description: Colposcopy Cervix With Biopsy(S);  Proc Date: 03/28/1997;  Comments: mild dysplasia with koilocytosis, and endocervical polyp     HI LIGATE FALLOPIAN TUBE      Description: Tubal Ligation;  Recorded: 01/02/2009;      Family History   Problem Relation Age of Onset     Breast cancer Mother 58     Hypothyroidism Mother      Osteoporosis Mother      Hypertension Mother      Depression Mother      Hypertension Father      Heart disease Father      Stroke Paternal Aunt      Depression Paternal Grandfather      Depression Maternal Grandfather      Anuerysm Paternal Uncle      Diabetes Paternal Grandmother      Hypertension Sister       Social History     Social History     Marital status:      Spouse name: N/A      "Number of children: N/A     Years of education: N/A     Occupational History     Not on file.     Social History Main Topics     Smoking status: Former Smoker     Quit date: 10/19/1987     Smokeless tobacco: Never Used      Comment: 30 years ago     Alcohol use 1.8 oz/week     3 Glasses of wine per week     Drug use: No     Sexual activity: Not on file     Other Topics Concern     Not on file     Social History Narrative       Current Outpatient Prescriptions   Medication Sig Dispense Refill     azelastine (ASTELIN) 137 mcg (0.1 %) nasal spray Use in each nostril as directed 30 mL 12     cholecalciferol, vitamin D3, 1,000 unit tablet Take 1,000 Units by mouth daily.       coenzyme Q10 100 mg/mL Liqd Take by mouth.       diphenhydrAMINE-acetaminophen (TYLENOL PM EXTRA STRENGTH)  mg Tab Take 1 tablet by mouth at bedtime as needed.       ibuprofen (ADVIL,MOTRIN) 800 MG tablet TAKE 1 TABLET BY MOUTH 3 TIMES A DAY AS NEEDED 90 tablet 1     meloxicam (MOBIC) 15 MG tablet Take 0.5-1 tablets (7.5-15 mg total) by mouth daily as needed for pain. 60 tablet 2     NON FORMULARY Bone up- calcium       TURMERIC, BULK, MISC Use As Directed.       No current facility-administered medications for this visit.       Objective:   Vital Signs:   Visit Vitals     BP (!) 158/99 (Patient Site: Left Arm, Patient Position: Sitting, Cuff Size: Adult Regular)     Pulse 67     Temp 97  F (36.1  C) (Oral)     Resp 16     Ht 5' 4.5\" (1.638 m)     Wt 147 lb 2 oz (66.7 kg)     LMP 06/30/2010     BMI 24.86 kg/m2        EKG:  Normal    VisionScreening:   Visual Acuity Screening    Right eye Left eye Both eyes   Without correction: 10-10 10-10 10-10   With correction:           PHYSICAL EXAM  BP (!) 158/99 (Patient Site: Left Arm, Patient Position: Sitting, Cuff Size: Adult Regular)  Pulse 67  Temp 97  F (36.1  C) (Oral)   Resp 16  Ht 5' 4.5\" (1.638 m)  Wt 147 lb 2 oz (66.7 kg)  LMP 06/30/2010  BMI 24.86 kg/m2    General Appearance:    " Alert, cooperative, no distress, appears stated age   Head:    Normocephalic, without obvious abnormality, atraumatic   Eyes:    PERRL, conjunctiva/corneas clear, EOM's intact, fundi     benign, both eyes   Ears:    Normal TM's and external ear canals, both ears   Nose:   Nares normal, septum midline, mucosa normal, no drainage     or sinus tenderness   Throat:   Lips, mucosa, and tongue normal; teeth and gums normal   Neck:   Supple, symmetrical, trachea midline, no adenopathy;     thyroid:  no enlargement/tenderness/nodules; no carotid    bruit or JVD   Back:     Symmetric, no curvature, ROM normal, no CVA tenderness   Lungs:     Clear to auscultation bilaterally, respirations unlabored   Chest Wall:    No tenderness or deformity    Heart:    Regular rate and rhythm, S1 and S2 normal, no murmur, rub    or gallop   Breast Exam:    No tenderness, masses, or nipple abnormality   Abdomen:     Soft, non-tender, bowel sounds active all four quadrants,     no masses, no organomegaly           Extremities:   Extremities normal, atraumatic, no cyanosis or edema   Pulses:   2+ and symmetric all extremities   Skin:   Skin color, texture, turgor normal, no rashes or lesions   Lymph nodes:   Cervical, supraclavicular, and axillary nodes normal   Neurologic:   CNII-XII intact, normal strength, sensation and reflexes     throughout       Assessment Results 10/1/2018   Activities of Daily Living No help needed   Instrumental Activities of Daily Living No help needed   Get Up and Go Score Less than 12 seconds   Mini Cog Total Score 3   Some recent data might be hidden     A Mini Cog score of 0-2 suggests the possibility of dementia, score of 3-5 suggests no dementia    Identified Health Risks:     She is at risk for falling and has been provided with information to reduce the risk of falling at home.

## 2021-06-21 NOTE — PROGRESS NOTES
I met with Calin Aguilar at the request of  to recheck her blood pressure.  Blood pressure medications on the MAR were reviewed with patient.    Patient has taken all medications as per usual regimen: Yes, Lisinopril 10 mg  Patient reports tolerating them without any issues or concerns: Yes    Vitals:    10/17/18 0946 10/17/18 0951   BP: (!) 141/95 (!) 131/92   Patient Site: Left Arm Left Arm   Patient Position: Sitting Sitting   Cuff Size: Adult Regular Adult Regular   Pulse: 69 71       Blood pressure was taken, previous encounter was reviewed, recorded blood pressure below 140/90.  Patient was discharged and the note will be sent to the provider for final review.

## 2021-06-21 NOTE — PROGRESS NOTES
I met with Calin Aguilar at the request of Dr Delgado to recheck her blood pressure.  Blood pressure medications on the MAR were reviewed with patient.    Patient has taken all medications as per usual regimen: Yes and pt increased lisinopril from 10 to 20 mg daily per Dr Delgado on 10/18/18  Patient reports tolerating them without any issues or concerns: Yes    Vitals:    10/31/18 1507   BP: 104/68   Patient Site: Right Arm   Patient Position: Sitting   Cuff Size: Adult Regular   Pulse: 79       Blood pressure was taken, previous encounter was reviewed, recorded blood pressure below 140/90.  Patient was discharged and the note will be sent to the provider for final review.

## 2021-06-22 NOTE — PROGRESS NOTES
I met with Calin Aguilar at the request of Dr. Delgado to recheck her blood pressure.  Blood pressure medications on the MAR were reviewed with patient.    Patient has taken all medications as per usual regimen: Yes  Patient reports tolerating them without any issues or concerns: Yes    Vitals:    11/29/18 1026   BP: 126/76   Patient Site: Left Arm   Patient Position: Sitting   Cuff Size: Adult Regular   Pulse: 71       Blood pressure was taken, previous encounter was reviewed, recorded blood pressure below 140/90.  Patient was discharged and the note will be sent to the provider for final review.

## 2021-06-23 NOTE — TELEPHONE ENCOUNTER
New Appointment Needed  What is the reason for the visit:    Pre-Op Appt Request  When is the surgery? :  2/27/19  Where is the surgery?:   [facility]   Who is the surgeon? :  Dr. Aleman   What type of surgery is being done?: Surgery for osteoarthritis on right thumb.   Provider Preference: Any available  How soon do you need to be seen?: Any Thursday prior to surgery date. PCP does not have any 40 minute slots prior to surgery besides INF/EDF.   Waitlist offered?: No  Okay to leave a detailed message:  Yes

## 2021-06-24 NOTE — TELEPHONE ENCOUNTER
Refill Approved    Rx renewed per Medication Renewal Policy. Medication was last renewed on 10/19/17.    Carmen Farris, [facility] Triage/Med Refill 2/18/2019     Requested Prescriptions   Pending Prescriptions Disp Refills     ibuprofen (ADVIL,MOTRIN) 800 MG tablet 90 tablet 1     Sig: TAKE 1 TABLET BY MOUTH 3 TIMES A DAY AS NEEDED    There is no refill protocol information for this order        azelastine (ASTELIN) 137 mcg (0.1 %) nasal spray 30 mL 12     Sig: Use in each nostril as directed    Nasal Spray Protocol Passed - 2/16/2019  1:39 PM       Passed - Patient has had office visit/physical in last 1 year    Last office visit with prescriber/PCP: 12/5/2016 Amarilis Delgado MD OR same dept: 4/5/2018 Isabel Luevano FNP OR same specialty: 4/5/2018 Isabel Luevano FNP Last physical: 10/1/2018 Last MTM visit: Visit date not found    Next visit within 3 mo: Visit date not found  Next physical within 3 mo: Visit date not found  Prescriber OR PCP: Amarilis Delgado MD  Last diagnosis associated with med order: There are no diagnoses linked to this encounter.

## 2021-06-24 NOTE — PROGRESS NOTES
Preoperative Exam    Scheduled Procedure: Osteoarthritis of RT thumb  Surgery Date:  2/27/2019  Surgery Location: [facility]    Surgeon:  Dr Aleman    Assessment/Plan:     1. Encounter for preoperative examination for general surgical procedure     2. Essential hypertension           Surgical Procedure Risk: Low (reported cardiac risk generally < 1%)  Have you had prior anesthesia?: Yes  Have you or any family members had a previous anesthesia reaction:  No  Do you or any family members have a history of a clotting or bleeding disorder?: No  Cardiac Risk Assessment: no increased risk for major cardiac complications    Patient approved for surgery with general or local anesthesia.    Please Note:  On Lisnopril that patient will hold on day of surgery and resume afterwards    Functional Status: Independent  Patient plans to recover at home with family.     Subjective:      Chief Complaint   Patient presents with     Pre-op Exam     Pt having surgery on RT thumb for Osteoarthritis on 2/27/19 at the [facility] with Dr Aleman       Calin Aguilar is a 63 y.o. female who presents for a preoperative consultation.      No cough, cold.  No N/V/D/. No rashes    All other systems reviewed and are negative, other than those listed in the HPI.    Pertinent History  Do you have difficulty breathing or chest pain after walking up a flight of stairs: No  History of obstructive sleep apnea: No  Steroid use in the last 6 months: YES  Frequent Aspirin/NSAID use: Yes: Baby Aspirin  Prior Blood Transfusion: No  Prior Blood Transfusion Reaction: No  If for some reason prior to, during or after the procedure, if it is medically indicated, would you be willing to have a blood transfusion?:  There is no transfusion refusal.    Current Outpatient Medications   Medication Sig Dispense Refill     APPLE CIDER VINEGAR ORAL Take 1 tablet by mouth daily.       aspirin 81 mg chewable tablet Chew 81 mg daily.       cholecalciferol,  vitamin D3, 1,000 unit tablet Take 1,000 Units by mouth daily.       coenzyme Q10 100 mg/mL Liqd Take by mouth.       docosahexanoic acid/epa (FISH OIL ORAL) Take 2 tablets by mouth daily.       lisinopril (PRINIVIL,ZESTRIL) 20 MG tablet Take 1 tablet (20 mg total) by mouth daily. 90 tablet 1     NON FORMULARY Bone up- calcium       CHELSEY'S WORT ORAL Take 1 tablet by mouth daily.       TURMERIC, BULK, MISC Use As Directed.       azelastine (ASTELIN) 137 mcg (0.1 %) nasal spray Use in each nostril as directed 30 mL 12     diphenhydrAMINE-acetaminophen (TYLENOL PM EXTRA STRENGTH)  mg Tab Take 1 tablet by mouth at bedtime as needed.       ibuprofen (ADVIL,MOTRIN) 800 MG tablet TAKE 1 TABLET BY MOUTH 3 TIMES A DAY AS NEEDED 90 tablet 6     meloxicam (MOBIC) 15 MG tablet Take 0.5-1 tablets (7.5-15 mg total) by mouth daily as needed for pain. 60 tablet 2     No current facility-administered medications for this visit.         Allergies   Allergen Reactions     Codeine      Nausea       Patient Active Problem List   Diagnosis     Osteopenia     Insomnia     Osteoarthritis     Anxiety Disorder NOS     Hyperlipidemia     Constipation     Trigger finger     Prehypertension     Colon polyps     Neck pain     Arm pain     History of skin cancer     History of basal cell carcinoma     HTN (hypertension)       Past Medical History:   Diagnosis Date     Colon polyps      Dietary Calcium Deficiency     Created by Conversion      Impaired fasting glucose     Created by Conversion      Pain During Urination (Dysuria)     Created by Conversion      Polyps Of The Sigmoid Colon     Created by Conversion      Unable To Restrain Bowel Movement     Created by Conversion      Urinary Tract Infection     Created by Conversion        Past Surgical History:   Procedure Laterality Date     HAND SURGERY Left about 2010    For 1st CMC OA     CT COLPOSCOPY,CERVIX W/ADJ VAGINA,W/BX      Description: Colposcopy Cervix With Biopsy(S);  Proc  Date: 1997;  Comments: mild dysplasia with koilocytosis, and endocervical polyp     OK LIGATE FALLOPIAN TUBE      Description: Tubal Ligation;  Recorded: 2009;       Social History     Socioeconomic History     Marital status:      Spouse name: Not on file     Number of children: Not on file     Years of education: Not on file     Highest education level: Not on file   Occupational History     Not on file   Social Needs     Financial resource strain: Not on file     Food insecurity:     Worry: Not on file     Inability: Not on file     Transportation needs:     Medical: Not on file     Non-medical: Not on file   Tobacco Use     Smoking status: Former Smoker     Last attempt to quit: 10/19/1987     Years since quittin.3     Smokeless tobacco: Never Used     Tobacco comment: 30 years ago   Substance and Sexual Activity     Alcohol use: Yes     Alcohol/week: 1.8 oz     Types: 3 Glasses of wine per week     Drug use: No     Sexual activity: Not on file   Lifestyle     Physical activity:     Days per week: Not on file     Minutes per session: Not on file     Stress: Not on file   Relationships     Social connections:     Talks on phone: Not on file     Gets together: Not on file     Attends Jainism service: Not on file     Active member of club or organization: Not on file     Attends meetings of clubs or organizations: Not on file     Relationship status: Not on file     Intimate partner violence:     Fear of current or ex partner: Not on file     Emotionally abused: Not on file     Physically abused: Not on file     Forced sexual activity: Not on file   Other Topics Concern     Not on file   Social History Narrative            The 10-year ASCVD risk score (Nacho GREEN Jr. et al., 2013) is: 7%      Values used to calculate the score:        Age: 63 years        Sex: Female        Is Non- : No        Diabetic: No        Tobacco smoker: No        Systolic Blood Pressure: 126  "mmHg        Is BP treated: Yes        HDL Cholesterol: 68 mg/dL        Total Cholesterol: 220 mg/dL       Patient Care Team:  Amarilis Delgado MD as PCP - General (Family Medicine)          Objective:     Vitals:    02/14/19 1232   BP: 108/71   Pulse: 77   SpO2: 99%   Weight: 151 lb 9.6 oz (68.8 kg)   Height: 5' 4.69\" (1.643 m)   LMP: 06/30/2010         Physical Exam:  General Appearance: healthy, alert, oriented and no distress  HEENT Exam: Oropharynx clear without lesion or exudate  Neck:  supple, no adenopathy, thyroid normal  Heart: Normal heart sounds, S1 and S2, No murmurs.  Lungs: Normal breath sounds, Clear to auscultation bilateral  Skin exam: No visible or palpable abnormalities  Neurological exam: gait normal, alert and oriented X 3  Hem/Lymph/Immuno exam: negative findings:  no cervical nodes, no supraclavicular nodes,        Patient Instructions   Hold Lisinopril ( 10 mg) on the day of surgery.    Hold all supplements, aspirin and NSAIDs for 7 days prior to surgery.    Follow your surgeon's direction on when to stop eating and drinking prior to surgery.    Your surgeon will be managing your pain after your surgery.      Remove all jewelry and metal piercings before your surgery.     Remove nail polish from fingers before surgery.      EKG:  Not needed    Labs:  No labs were ordered during this visit    Immunization History   Administered Date(s) Administered     DT (pediatric) 07/31/2000     Hep A, historic 06/08/2012, 08/14/2013     Hep B, Adult 10/19/2017, 10/01/2018     Influenza [id number], 01/26/2010     Influenza high dose, seasonal 10/01/2018     Influenza, seasonal,quad inj 36+ mos 09/01/2016, 10/19/2017     Influenza, seasonal,quad inj 6-35 mos 01/26/2010, 01/18/2013, 09/18/2014     Pneumo Conj 13-V (2010&after) 10/01/2018     Tdap 11/21/2007, 01/26/2010     ZOSTER, LIVE 11/04/2014           Electronically signed by Michelle Kyle MD 02/20/19 12:18 PM  "

## 2021-06-24 NOTE — PATIENT INSTRUCTIONS - HE
Hold Lisinopril ( 10 mg) on the day of surgery.    Hold all supplements, aspirin and NSAIDs for 7 days prior to surgery.    Follow your surgeon's direction on when to stop eating and drinking prior to surgery.    Your surgeon will be managing your pain after your surgery.      Remove all jewelry and metal piercings before your surgery.     Remove nail polish from fingers before surgery.

## 2021-06-24 NOTE — TELEPHONE ENCOUNTER
RN cannot approve Refill Request    RN can NOT refill this medication med is not covered by policy/route to provider.         Carmen Farris, [facility] Triage/Med Refill 2/18/2019    Requested Prescriptions   Pending Prescriptions Disp Refills     ibuprofen (ADVIL,MOTRIN) 800 MG tablet 90 tablet 1     Sig: TAKE 1 TABLET BY MOUTH 3 TIMES A DAY AS NEEDED    There is no refill protocol information for this order      Signed Prescriptions Disp Refills     azelastine (ASTELIN) 137 mcg (0.1 %) nasal spray 30 mL 12     Sig: Use in each nostril as directed    Nasal Spray Protocol Passed - 2/16/2019  1:39 PM       Passed - Patient has had office visit/physical in last 1 year    Last office visit with prescriber/PCP: 12/5/2016 Amarilis Delgado MD OR same dept: 4/5/2018 Isabel Luevano FNP OR same specialty: 4/5/2018 Isabel Luevano FNP Last physical: 10/1/2018 Last MTM visit: Visit date not found    Next visit within 3 mo: Visit date not found  Next physical within 3 mo: Visit date not found  Prescriber OR PCP: Amarilis Delgado MD  Last diagnosis associated with med order: 1. Chronic rhinitis  - azelastine (ASTELIN) 137 mcg (0.1 %) nasal spray; Use in each nostril as directed  Dispense: 30 mL; Refill: 12

## 2021-06-25 NOTE — TELEPHONE ENCOUNTER
Refill Approved    Rx renewed per Medication Renewal Policy. Medication was last renewed on 06/09/2020.  Last office visit was 04/09/2021 with Dr Hidalgo.    Gloria Guthrie, [facility] Triage/Med Refill 6/4/2021     Requested Prescriptions   Pending Prescriptions Disp Refills     losartan (COZAAR) 25 MG tablet [Pharmacy Med Name: LOSARTAN POTASSIUM 25 MG TAB] 90 tablet 3     Sig: TAKE 1 TABLET BY MOUTH EVERY DAY       Angiotensin Receptor Blocker Protocol Passed - 6/4/2021 12:14 AM        Passed - PCP or prescribing provider visit in past 12 months       Last office visit with prescriber/PCP: Visit date not found OR same dept: 4/9/2021 Jazmine Hidalgo DO OR same specialty: 4/9/2021 Jazmine Hidalgo DO  Last physical: Visit date not found Last MTM visit: Visit date not found   Next visit within 3 mo: Visit date not found  Next physical within 3 mo: Visit date not found  Prescriber OR PCP: Kodak Friend MD  Last diagnosis associated with med order: 1. Essential hypertension  - losartan (COZAAR) 25 MG tablet [Pharmacy Med Name: LOSARTAN POTASSIUM 25 MG TAB]; TAKE 1 TABLET BY MOUTH EVERY DAY  Dispense: 90 tablet; Refill: 3    If protocol passes may refill for 12 months if within 3 months of last provider visit (or a total of 15 months).             Passed - Serum potassium within the past 12 months     Lab Results   Component Value Date    Potassium 4.6 12/17/2020             Passed - Blood pressure filed in past 12 months     BP Readings from Last 1 Encounters:   04/09/21 136/86             Passed - Serum creatinine within the past 12 months     Creatinine   Date Value Ref Range Status   12/17/2020 0.79 0.60 - 1.10 mg/dL Final

## 2021-06-26 NOTE — PROGRESS NOTES
Progress Notes by Robinson Campos PT at 3/26/2018  1:45 PM     Author: Robinson Campos PT Service: -- Author Type: Physical Therapist    Filed: 3/26/2018  3:32 PM Encounter Date: 3/26/2018 Status: Attested    : Robinson Campos PT (Physical Therapist) Cosigner: Molly Steven CNP at 3/26/2018  4:24 PM    Attestation signed by Molly Steven CNP at 3/26/2018  4:24 PM    Follow therapist's recommendations please                    [facility] Certification Request    March 26, 2018      Patient: Calin Aguilar  MR Number: [id number]  YOB: 1955  Date of Visit: 3/26/2018    Thank you for this referral.   We are seeing Calin Aguilar for Physical Therapy of neck, back, and L shoulder pain.    Medicare and/or Medicaid requires physician review and approval of the treatment plan. Please review the plan of care and verify that you agree with the therapy plan of care by co-signing this note.      Plan of Care  Authorization / Certification Start Date: 03/26/18  Authorization / Certification End Date: 06/26/18  Authorization / Certification Number of Visits: 12  Communication with: Referral Source  Patient Related Instruction: Nature of Condition;Treatment plan and rationale;Self Care instruction;Body mechanics;Posture;Basis of treatment;Precautions;Next steps;Expected outcome  Times per Week: 1-2  Number of Weeks: 8-12  Number of Visits: 14-16  Discharge Planning: Pt will be discharged when all goals are met, lack of progress or worsening, condition, MD referral, and/or pt desires to continue with HEP independently.  Therapeutic Exercise: ROM;Stretching;Strengthening  Neuromuscular Reeducation: kinesio tape;posture;core;TNE  Manual Therapy: soft tissue mobilization;myofascial release;joint mobilization;muscle energy;strain counterstrain  Modalities: electrical stimulation;TENS;iontophoresis;ultrasound;cold pack;hot pack  Functional Training (ADL's): self care;compensatory  training;ergonomics;instructions in transfers;ADL's;instructions for equipment;meal prep  Equipment: theraband;TENS unit    Goals:  Patient will sit: 30 minutes;with less pain;with less difficultty;for other activity;in 6 weeks  Other Activity: reading  Pt will: be independent with HEP within 3 weeks.  Pt will: be able to bend WFL and pain-free within 8 weeks.  Pt will: increase lumbar and cervical max torque by 30# or more to demonstrate improved strength within 8 weeks.  Pt will: be able to reach overhead without pain and WFL for ADLs within 6 weeks.      If you have any questions or concerns, please don't hesitate to call.    Sincerely,      Robinson Campos, PT        Physician recommendation:     ___ Follow therapist's recommendation        ___ Modify therapy      *Physician co-signature indicates they certify the need for these services furnished within this plan and while under their care.        [facility]   Cervical Thoracic Initial Evaluation    Patient Name: Calin Aguilar  Date of evaluation: 3/26/2018  Referral Diagnosis: Cervicalgia  Referring provider: Andrei Rivers DO  Visit Diagnosis:     ICD-10-CM    1. Chronic bilateral low back pain without sciatica M54.5     G89.29    2. Chronic neck pain M54.2     G89.29    3. Chronic left shoulder pain M25.512     G89.29    4. Generalized muscle weakness M62.81    5. Abnormal posture R29.3        Assessment:     Calin Aguilar is a 63 y.o. female who presents to therapy today with chief complaints of chronic neck and low back pain and L shoulder pain. L shoulder pain began after fall down stairs about 3 years ago. Improving symptoms since injection, but continued limitations with ROM, IR/ER. Believed to be related to impingement, RTC tendinitis. Neck and back pain causing general limitations with ROM in each. Both also limiting ADL function. Pt may be appropriate for MedX program for further neck, back, and core strengthening. Pt will be tested next  session. Pt reported h/o osteopenia, anxiety, hyperlipidemia, and frozen shoulder. Functional impairments include bending, lifting, reaching, bed mobility, ADLs, walking, and dressing, bathing.     Impairments in  pain, posture, ROM, joint mobility, strength, ADL's  The POC is dynamic and will be modified on an ongoing basis.  Patient will return to clinic if symptoms persist.  Barriers to achieving goals as noted in the assessment section may affect outcome.  Prognosis to achieve goals is  good   Skilled PT is required to improve mobility, ROM, mobility, posture, strength, ADL function, and reduce pain.  Pt. is appropriate for skilled PT intervention as outlined in the Plan of Care (POC).  Pt. is a good candidate for skilled PT services to improve pain levels and function.    Goals:  Patient will sit: 30 minutes;with less pain;with less difficultty;for other activity;in 6 weeks  Other Activity: reading  Pt will: be independent with HEP within 3 weeks.  Pt will: be able to bend WFL and pain-free within 8 weeks.  Pt will: increase lumbar and cervical max torque by 30# or more to demonstrate improved strength within 8 weeks.  Pt will: be able to reach overhead without pain and WFL for ADLs within 6 weeks.    Patient's expectations/goals are realistic.    Barriers to Learning or Achieving Goals:  Chronicity of the problem.       Plan / Patient Instructions:        Plan of Care:   Authorization / Certification Start Date: 03/26/18  Authorization / Certification End Date: 06/26/18  Authorization / Certification Number of Visits: 12  Communication with: Referral Source  Patient Related Instruction: Nature of Condition;Treatment plan and rationale;Self Care instruction;Body mechanics;Posture;Basis of treatment;Precautions;Next steps;Expected outcome  Times per Week: 1-2  Number of Weeks: 8-12  Number of Visits: 14-16  Discharge Planning: Pt will be discharged when all goals are met, lack of progress or worsening,  "condition, MD referral, and/or pt desires to continue with HEP independently.  Therapeutic Exercise: ROM;Stretching;Strengthening  Neuromuscular Reeducation: kinesio tape;posture;core;TNE  Manual Therapy: soft tissue mobilization;myofascial release;joint mobilization;muscle energy;strain counterstrain  Modalities: electrical stimulation;TENS;iontophoresis;ultrasound;cold pack;hot pack  Functional Training (ADL's): self care;compensatory training;ergonomics;instructions in transfers;ADL's;instructions for equipment;meal prep  Equipment: theraband;TENS unit     POC was discussed with patient and is in agreement with this plan    Plan for next visit: review HEP, lumbar and cervical MedX testing (if time), rotary torso, postural progression, light shoulder ROM exercises, RTC strengthening, core initiation     Subjective:         Social information:   Occupation:retired   Work Status:NA   Equipment Available: None    History of Present Illness:    Calin is a 63 y.o. female who presents to therapy today with complaints of L shoulder and chronic low back and neck pain. Pt shares that her back and neck have been ongoing for mayn years and slowly getting worse. She says that her L shoulder has been ongoing for about 3 years after a fall. She did have an injection since 3/21/18. She says that her back has been bothering her more lately. She denies any abnormal numbness or tingling. She reports  an episodic  history of similar symptoms. She describes their previous level of function as not limited.    Pain Ratin shoulder, 8 in the low back, \"okay\" in the neck  Pain rating at best: 0 shoulder, 0 in low back  Pain rating at worst: 8 shoulder, 9 in low back  Pain description: sharp and shooting    Functional limitations are described as occurring with:   bed mobility  bending  lifting  personal cares donning shoes and socks, donning shirt or jacket, washing hair and washing body  reaching at shoulder height, overhead and behind " back  performing routine daily activities  walking : prolonged         Objective:      Note: Items left blank indicates the item was not performed or not indicated at the time of the evaluation.    Patient Outcome Measures :    Neck Disability Score in %: 22   Scores range from 0-100%, where a score of 0% represents minimal pain and maximal function. The minmal clinically important difference is a score reduction of 10%.    Cervical Thoracic Examination  1. Chronic bilateral low back pain without sciatica     2. Chronic neck pain     3. Chronic left shoulder pain     4. Generalized muscle weakness     5. Abnormal posture       Involved side: Left and Bilateral  Posture Observation:      General sitting posture is  fair.  General standing posture is fair.  Lumbopelvic complex: Mild scoliosis, possible    Cervical ROM:    Date: 3/26/18     *Indicate scale AROM AROM AROM   Cervical Flexion Mod restriction     Cervical Extension WFL      Right Left Right Left Right Left   Cervical Sidebending 20 deg 25 deg       Cervical Rotation WFL WFL       Cervical Protraction      Cervical Retraction      Thoracic Flexion      Thoracic Extension      Thoracic Sidebending         Thoracic Rotation           Lumbar ROM:  Date: 3/26/18     *Indicate scale AROM AROM AROM   Lumbar Flexion WFL     Lumbar Extension Min restriction      Right Left Right Left Right Left   Lumbar Sidebending Min restricion Min restriction       Lumbar Rotation Min restriction Min restriction       Thoracic Flexion      Thoracic Extension      Thoracic Sidebending         Thoracic Rotation           Strength     Date: 3/26/18     Cervical Myotomes/5 Right Left Right Left Right Left   Cervical Flexion (C1-2)         Cervical Sidebending (C3)         Shoulder Elevation (C4) 5 5       Shoulder Abduction (C5) 5 5       Elbow Flexion (C6) 5 5       Elbow Extension (C7) 5 5       Wrist Flexion (C7)         Wrist Extension (C6)         Thumb abduction (C8)          Finger Abduction (T1)           Shoulder/Elbow ROM  Date: 3/26/18     Shoulder and Elbow ROM ( )   AROM in degrees AROM in degrees AROM in degrees    Right Left Right Left Right Left   Shoulder Flexion (0-180 )  WFL       Shoulder Abduction (0-180 )  WFL       Shoulder Extension (0-60 )         Shoulder ER (0-90 )  55 deg       Shoulder IR (0-70 )  45 deg       Shoulder IR/EXT L1 Belt line       Elbow Flexion (150 )  WFL       Elbow Extension (0 )  WFL        PROM in degrees PROM in degrees PROM in degrees    Right Left Right Left Right Left   Shoulder Flexion (0-180 )         Shoulder Abduction (0-180 )         Shoulder Extension (0-60 )         Shoulder ER (0-90 )         Shoulder IR (0-70 )         Elbow Flexion (150 )         Elbow Extension (0 )           Sensation: not impaired, occasional tingling into L thumb  Flexibility: not impaired    Palpation: tenderness throughout the lower back    Passive Mobility-Joint Integrity: Hypomobile.     Shoulder Special Tests  Impingement Cluster Right (+/-) Left (+/-) Rotator Cuff Tests Right (+/-) Left (+/-)   Ramirez-Elder  + Drop Arm Sign     Painful Arc  + Hornblowers     Infraspinatus Test   ERLS     AC Tests Right (+/-) Left (+/-) IRLS     Active Compression   Labral Tests Right (+/-) Left (+/-)   Crossbody Adduction   Biceps Load Test II     AC Resisted Extension   Jerk Test     GH Instability Tests Right (+/-) Left (+/-) Betty Test     Sulcus Sign   Biceps Tests Right (+/-) Left (+/-)   Apprehension   Speed     Relocation   Yergasons     Other:   Other:     Other:   Other:         Cervical Special Tests     Cervical Special Tests Right Left UE Nerve Mobility Right Left   Cervical compression - - Median nerve     Cervical distraction - - Ulnar nerve     Spurlings test   Radial nerve     Shoulder abduction sign   Thoracic outlet     Deep neck flexor endurance test   Yen     Upper cervical rotation   Adsons     Sharper-Paige   Cervical rotation lateral flexion      Alar ligament test   Other:     Other:   Other:       Lumbar Special Tests:  Lumbar Special Tests Right Left SI Tests Right  Left   Quadrant test - + SI Compression     Straight leg raise - - SI Distraction     Crossover response   POSH Test     Slump   Sacral Thrust     Sit-up test  FADIR     Trunk extensor endurance test  Resisted Abduction     Prone instability test  Other:     Pubic shotgun  Other:       UE Screen: not impaired    Treatment Today     TREATMENT MINUTES COMMENTS   Evaluation 40    Self-care/ Home management     Manual therapy     Neuromuscular Re-education     Therapeutic Activity     Therapeutic Exercises 15 See exercises for HEP. Pt educated on MedX and future POC.   Gait training     Modality__________________                Total 55    Blank areas are intentional and mean the treatment did not include these items.     PT Evaluation Code: (Please list factors)  Patient History/Comorbidities: osteopenia, hyperlipidemia, anxiety, frozen shoulder  Examination: chronic neck and back pain, L shoulder pain, weakness, abnormal posture  Clinical Presentation: stable  Clinical Decision Making: low    Patient History/  Comorbidities Examination  (body structures and functions, activity limitations, and/or participation restrictions) Clinical Presentation Clinical Decision Making (Complexity)   No documented Comorbidities or personal factors 1-2 Elements Stable and/or uncomplicated Low   1-2 documented comorbidities or personal factor 3 Elements Evolving clinical presentation with changing characteristics Moderate   3-4 documented comorbidities or personal factors 4 or more Unstable and unpredictable High          Robinson Campos, PT, DPT  3/26/2018  1:34 PM

## 2021-06-27 ENCOUNTER — HEALTH MAINTENANCE LETTER (OUTPATIENT)
Age: 66
End: 2021-06-27

## 2021-07-03 NOTE — ADDENDUM NOTE
Addendum Note by Amarilis Delgado MD at 10/17/2018 10:14 AM     Author: Amarilis Delgado MD Service: -- Author Type: Physician    Filed: 10/17/2018 10:14 AM Encounter Date: 10/17/2018 Status: Signed    : Amarilis Delgado MD (Physician)    Addended by: AMARILIS DELGADO on: 10/17/2018 10:14 AM        Modules accepted: Orders

## 2021-07-13 ENCOUNTER — RECORDS - HEALTHEAST (OUTPATIENT)
Dept: ADMINISTRATIVE | Facility: CLINIC | Age: 66
End: 2021-07-13

## 2021-07-14 PROBLEM — Z85.038 HISTORY OF COLON CANCER: Status: RESOLVED | Noted: 2017-10-18 | Resolved: 2017-10-19

## 2021-07-21 ENCOUNTER — RECORDS - HEALTHEAST (OUTPATIENT)
Dept: ADMINISTRATIVE | Facility: CLINIC | Age: 66
End: 2021-07-21

## 2021-07-23 ENCOUNTER — RECORDS - HEALTHEAST (OUTPATIENT)
Dept: ADMINISTRATIVE | Facility: CLINIC | Age: 66
End: 2021-07-23

## 2021-08-11 ENCOUNTER — TRANSFERRED RECORDS (OUTPATIENT)
Dept: HEALTH INFORMATION MANAGEMENT | Facility: CLINIC | Age: 66
End: 2021-08-11

## 2021-10-09 ENCOUNTER — TRANSFERRED RECORDS (OUTPATIENT)
Dept: HEALTH INFORMATION MANAGEMENT | Facility: CLINIC | Age: 66
End: 2021-10-09

## 2021-10-13 ENCOUNTER — TELEPHONE (OUTPATIENT)
Dept: FAMILY MEDICINE | Facility: CLINIC | Age: 66
End: 2021-10-13

## 2021-10-13 NOTE — TELEPHONE ENCOUNTER
Patient returning call. Relayed Dr. Delgado's message to patient. Pt does not want to come in if she cant get in tmrw or Friday. I offered WIC, she declined. Pt stated she will look into mychart and do the evisit, if not she will go back to urgent care or [facility].

## 2021-10-13 NOTE — TELEPHONE ENCOUNTER
Reason for call:  Patient reporting a symptom    Symptom or request: Pt was on vacation in [place] and was diagnosed with a UTI. She was seen at [facility] in [place]. They prescribed her nitrofurantoin. Pt stated that the burning and frequent urinating stopped, but she's having side pains and lower back pain. Pt stated today the pain is worst then the last few days. Pt is back in town, and would like to see if provider can prescribed something else or if she needs to come in to leave a urine sample.    Duration (how long have symptoms been present): few days    Have you been treated for this before? Yes    Additional comments: N/A    Phone Number patient can be reached at:  Cell number on file:    Telephone Information:   Mobile [phone]       Best Time:  Any time    Can we leave a detailed message on this number:  YES    Call taken on 10/13/2021 at 3:14 PM by Vidya Hernandez

## 2021-10-13 NOTE — TELEPHONE ENCOUNTER
Please have her set an office visit or at least an E visit then I can order the lab appointment. If she doesn t have my chart she would need to go to an urgent care if we don t have openings.  Letty Delgado MD

## 2021-10-17 ENCOUNTER — HEALTH MAINTENANCE LETTER (OUTPATIENT)
Age: 66
End: 2021-10-17

## 2022-01-03 ENCOUNTER — OFFICE VISIT (OUTPATIENT)
Dept: FAMILY MEDICINE | Facility: CLINIC | Age: 67
End: 2022-01-03
Payer: COMMERCIAL

## 2022-01-03 VITALS
BODY MASS INDEX: 25.66 KG/M2 | DIASTOLIC BLOOD PRESSURE: 85 MMHG | HEART RATE: 71 BPM | TEMPERATURE: 97.2 F | HEIGHT: 65 IN | WEIGHT: 154 LBS | SYSTOLIC BLOOD PRESSURE: 138 MMHG

## 2022-01-03 DIAGNOSIS — I10 ESSENTIAL HYPERTENSION: ICD-10-CM

## 2022-01-03 DIAGNOSIS — H25.9 AGE-RELATED CATARACT OF BOTH EYES, UNSPECIFIED AGE-RELATED CATARACT TYPE: ICD-10-CM

## 2022-01-03 DIAGNOSIS — Z23 IMMUNIZATION DUE: ICD-10-CM

## 2022-01-03 DIAGNOSIS — Z01.818 PREOP GENERAL PHYSICAL EXAM: Primary | ICD-10-CM

## 2022-01-03 PROCEDURE — G0008 ADMIN INFLUENZA VIRUS VAC: HCPCS | Performed by: FAMILY MEDICINE

## 2022-01-03 PROCEDURE — 99214 OFFICE O/P EST MOD 30 MIN: CPT | Mod: 25 | Performed by: FAMILY MEDICINE

## 2022-01-03 PROCEDURE — 90662 IIV NO PRSV INCREASED AG IM: CPT | Performed by: FAMILY MEDICINE

## 2022-01-03 RX ORDER — MULTIVITAMIN WITH IRON
1 TABLET ORAL DAILY
COMMUNITY
End: 2023-01-25

## 2022-01-03 ASSESSMENT — MIFFLIN-ST. JEOR: SCORE: 1221.48

## 2022-01-03 NOTE — PATIENT INSTRUCTIONS
Check with surgery center whether you need a covid test 3-5 days before your procedure. Send [employer] message or call if need to schedule a covid test through us.

## 2022-01-03 NOTE — PROGRESS NOTES
[facility]  [address] [facility] [address]  Phone: [phone]  Fax: [phone]  Primary Provider: Amarilis Delgado  Pre-op Performing Provider: VIRGINIE CRUZ    PREOPERATIVE EVALUATION:  Today's date: 1/3/2022    Calin Aguilar is a 66 year old female who presents for a preoperative evaluation.    Surgical Information:  Surgery/Procedure: cataract surgery  Surgery Location: [facility]  Surgeon: Dr. Roldan  Surgery Date: 1/14/22 and 1/25/22  Time of Surgery: tbd  Where patient plans to recover: At home with family  Fax number for surgical facility: [phone]    Type of Anesthesia Anticipated: Local    Assessment & Plan     Preop general physical exam  Age-related cataract of both eyes, unspecified age-related cataract type  Patient undergoing bilateral cataract surgery. The proposed surgical procedure is considered LOW risk.    RECOMMENDATION:  APPROVAL GIVEN to proceed with proposed procedure, without further diagnostic evaluation.  Recommended avoiding NSAIDs and nonessential vitamins and supplements 1 week prior.    Essential hypertension  Blood pressure controlled on losartan which she will take the morning of procedure.    Immunization due  Influenza vaccine given today.  - INFLUENZA, QUAD, HD, PF, 65+ (FLUZONE HD)      Subjective      HPI related to upcoming procedure:   Patient notes decreased visual acuity over the past few years, worse on the right and particularly with nighttime vision.    Preop Questions 1/3/2022   1. Have you ever had a heart attack or stroke? No   2. Have you ever had surgery on your heart or blood vessels, such as a stent placement, a coronary artery bypass, or surgery on an artery in your head, neck, heart, or legs? No   3. Do you have chest pain with activity? No   4. Do you have a history of  heart failure? No   5. Do you currently have a cold, bronchitis or symptoms of other infection? No   6. Do you have a cough, shortness  of breath, or wheezing? No   7. Do you or anyone in your family have previous history of blood clots? No   8. Do you or does anyone in your family have a serious bleeding problem such as prolonged bleeding following surgeries or cuts? No   9. Have you ever had problems with anemia or been told to take iron pills? No   10. Have you had any abnormal blood loss such as black, tarry or bloody stools, or abnormal vaginal bleeding? No   11. Have you ever had a blood transfusion? No   12. Are you willing to have a blood transfusion if it is medically needed before, during, or after your surgery? Yes   13. Have you or any of your relatives ever had problems with anesthesia? No   14. Do you have sleep apnea, excessive snoring or daytime drowsiness? No   15. Do you have any artifical heart valves or other implanted medical devices like a pacemaker, defibrillator, or continuous glucose monitor? No   16. Do you have artificial joints? No   17. Are you allergic to latex? No     Health Care Directive:  Patient does not have a Health Care Directive or Living Will: Patient states has Advance Directive and will bring in a copy to clinic.    Preoperative Review of :   reviewed - no record of controlled substances prescribed.    Review of Systems  CONSTITUTIONAL: NEGATIVE for fever, chills, change in weight  ENT/MOUTH: NEGATIVE for ear, mouth and throat problems  RESP: NEGATIVE for significant cough or SOB  CV: NEGATIVE for chest pain, palpitations or peripheral edema    Patient Active Problem List    Diagnosis Date Noted     History of basal cell carcinoma 10/01/2018     Priority: Medium     HTN (hypertension) 10/01/2018     Priority: Medium     Adenomatous polyp of colon, unspecified part of colon-Multiple adenomatous 10/19/2017     Priority: Medium     Neck pain 10/19/2017     Priority: Medium     Arm pain 10/19/2017     Priority: Medium     Constipation 09/01/2016     Priority: Medium     Trigger finger 09/01/2016      Priority: Medium     Osteopenia      Priority: Medium     Created by Conversion  Replacement Utility updated for latest IMO load         Osteoarthritis      Priority: Medium     Created by Conversion  Replacement Utility updated for latest IMO load         Anxiety Disorder NOS      Priority: Medium     Created by Conversion  Replacement Utility updated for latest IMO load         Hyperlipidemia 06/01/2015     Priority: Medium     Insomnia      Priority: Medium     Created by Conversion          Past Medical History:   Diagnosis Date     Benign neoplasm of colon     Created by Conversion      Colon polyps      Dysuria     Created by Conversion      Full incontinence of feces     Created by Conversion      Impaired fasting glucose     Created by Conversion      Mineral deficiency, not elsewhere classified     Created by Conversion      Urinary tract infection, site not specified     Created by Conversion      Past Surgical History:   Procedure Laterality Date     HAND SURGERY Left about 2010    For 1st [facility] OA     HAND SURGERY Right 02/2019    For 1st [facility] OA and carpal tunnel     GA COLPOSCOPY,CERVIX W/ADJ VAGINA,W/BX      Description: Colposcopy Cervix With Biopsy(S);  Proc Date: 03/28/1997;  Comments: mild dysplasia with koilocytosis, and endocervical polyp     ZZC LIGATE FALLOPIAN TUBE      Description: Tubal Ligation;  Recorded: 01/02/2009;     Current Outpatient Medications   Medication Sig Dispense Refill     azelastine (ASTELIN) 137 mcg (0.1 %) nasal spray [AZELASTINE (ASTELIN) 137 MCG (0.1 %) NASAL SPRAY] Use in each nostril as directed 30 mL 4     cholecalciferol, vitamin D3, 1,000 unit tablet [CHOLECALCIFEROL, VITAMIN D3, 1,000 UNIT TABLET] Take 1,000 Units by mouth daily.       ibuprofen (ADVIL,MOTRIN) 800 MG tablet [IBUPROFEN (ADVIL,MOTRIN) 800 MG TABLET] Take 1 tablet (800 mg total) by mouth every 8 (eight) hours as needed for pain. 90 tablet 3     losartan (COZAAR) 25 MG tablet [LOSARTAN (COZAAR) 25 MG  "TABLET] TAKE 1 TABLET BY MOUTH EVERY DAY 90 tablet 3     magnesium 250 MG tablet Take 1 tablet by mouth daily       NON FORMULARY [NON FORMULARY] Bone up- calcium       vit A/vit C/vit E/zinc/copper (PRESERVISION AREDS ORAL) [VIT A/VIT C/VIT E/ZINC/COPPER (PRESERVISION AREDS ORAL)] Take by mouth.         Allergies   Allergen Reactions     Codeine Unknown     Nausea     Lisinopril Unknown     Cough and lightheaded        Social History     Tobacco Use     Smoking status: Former Smoker     Quit date: 10/19/1987     Years since quittin.2     Smokeless tobacco: Never Used     Tobacco comment: 30 years ago   Substance Use Topics     Alcohol use: Yes     Alcohol/week: 3.0 standard drinks     Family History   Problem Relation Age of Onset     Breast Cancer Mother 58.00     Hypothyroidism Mother      Osteoporosis Mother      Hypertension Mother      Depression Mother      Hypertension Father      Heart Disease Father      Cerebrovascular Disease Paternal Aunt      Depression Paternal Grandfather      Depression Maternal Grandfather      Anuerysm Paternal Uncle      Diabetes Paternal Grandmother      Hypertension Sister      History   Drug Use No         Objective     /85   Pulse 71   Temp 97.2  F (36.2  C) (Oral)   Ht 1.638 m (5' 4.5\")   Wt 69.9 kg (154 lb)   BMI 26.03 kg/m      Physical Exam  GENERAL APPEARANCE: healthy, alert and no distress  HENT: ear canals and TM's normal and nose and mouth without ulcers or lesions  RESP: lungs clear to auscultation - no rales, rhonchi or wheezes  CV: regular rate and rhythm, normal S1 S2  ABDOMEN: soft, nontender  NEURO: Normal strength and tone, sensory exam grossly normal, mentation intact and speech normal    Recent Labs   Lab Test 20  1107 20  1002   HGB 14.3  --      --     142   POTASSIUM 4.6 4.0   CR 0.79 0.78        Diagnostics:  No labs were ordered during this visit.   No EKG required for low risk surgery (cataract, skin procedure, " breast biopsy, etc).    Revised Cardiac Risk Index (RCRI):  The patient has the following serious cardiovascular risks for perioperative complications:   - No serious cardiac risks = 0 points     RCRI Interpretation: 0 points: Class I (very low risk - 0.4% complication rate)           Signed Electronically by: Sari Boyer DO  Copy of this evaluation report is provided to requesting physician.

## 2022-01-21 ASSESSMENT — ACTIVITIES OF DAILY LIVING (ADL): CURRENT_FUNCTION: NO ASSISTANCE NEEDED

## 2022-01-24 ENCOUNTER — OFFICE VISIT (OUTPATIENT)
Dept: FAMILY MEDICINE | Facility: CLINIC | Age: 67
End: 2022-01-24
Payer: COMMERCIAL

## 2022-01-24 VITALS
TEMPERATURE: 96.8 F | HEIGHT: 65 IN | WEIGHT: 150 LBS | BODY MASS INDEX: 24.99 KG/M2 | SYSTOLIC BLOOD PRESSURE: 134 MMHG | DIASTOLIC BLOOD PRESSURE: 84 MMHG | HEART RATE: 64 BPM

## 2022-01-24 DIAGNOSIS — Z00.00 MEDICARE ANNUAL WELLNESS VISIT, SUBSEQUENT: Primary | ICD-10-CM

## 2022-01-24 DIAGNOSIS — R09.82 POSTNASAL DRIP: ICD-10-CM

## 2022-01-24 DIAGNOSIS — Z13.220 LIPID SCREENING: ICD-10-CM

## 2022-01-24 DIAGNOSIS — M19.90 OSTEOARTHRITIS, UNSPECIFIED OSTEOARTHRITIS TYPE, UNSPECIFIED SITE: ICD-10-CM

## 2022-01-24 DIAGNOSIS — I10 ESSENTIAL HYPERTENSION: ICD-10-CM

## 2022-01-24 DIAGNOSIS — Z78.0 POSTMENOPAUSAL STATUS: ICD-10-CM

## 2022-01-24 DIAGNOSIS — R19.4 BOWEL HABIT CHANGES: ICD-10-CM

## 2022-01-24 PROBLEM — M54.2 NECK PAIN: Status: RESOLVED | Noted: 2017-10-19 | Resolved: 2022-01-24

## 2022-01-24 PROBLEM — M79.603 ARM PAIN: Status: RESOLVED | Noted: 2017-10-19 | Resolved: 2022-01-24

## 2022-01-24 LAB
ALBUMIN SERPL-MCNC: 4.1 G/DL (ref 3.5–5)
ALP SERPL-CCNC: 73 U/L (ref 45–120)
ALT SERPL W P-5'-P-CCNC: 29 U/L (ref 0–45)
ANION GAP SERPL CALCULATED.3IONS-SCNC: 9 MMOL/L (ref 5–18)
AST SERPL W P-5'-P-CCNC: 23 U/L (ref 0–40)
BILIRUB SERPL-MCNC: 0.6 MG/DL (ref 0–1)
BUN SERPL-MCNC: 19 MG/DL (ref 8–22)
CALCIUM SERPL-MCNC: 9.9 MG/DL (ref 8.5–10.5)
CHLORIDE BLD-SCNC: 105 MMOL/L (ref 98–107)
CHOLEST SERPL-MCNC: 209 MG/DL
CO2 SERPL-SCNC: 28 MMOL/L (ref 22–31)
CREAT SERPL-MCNC: 0.83 MG/DL (ref 0.6–1.1)
ERYTHROCYTE [DISTWIDTH] IN BLOOD BY AUTOMATED COUNT: 11.8 % (ref 10–15)
FASTING STATUS PATIENT QL REPORTED: YES
GFR SERPL CREATININE-BSD FRML MDRD: 76 ML/MIN/1.73M2
GLUCOSE BLD-MCNC: 94 MG/DL (ref 70–125)
HCT VFR BLD AUTO: 44.2 % (ref 35–47)
HDLC SERPL-MCNC: 55 MG/DL
HGB BLD-MCNC: 14.5 G/DL (ref 11.7–15.7)
LDLC SERPL CALC-MCNC: 137 MG/DL
MCH RBC QN AUTO: 30.2 PG (ref 26.5–33)
MCHC RBC AUTO-ENTMCNC: 32.8 G/DL (ref 31.5–36.5)
MCV RBC AUTO: 92 FL (ref 78–100)
PLATELET # BLD AUTO: 257 10E3/UL (ref 150–450)
POTASSIUM BLD-SCNC: 4.7 MMOL/L (ref 3.5–5)
PROT SERPL-MCNC: 7.4 G/DL (ref 6–8)
RBC # BLD AUTO: 4.8 10E6/UL (ref 3.8–5.2)
SODIUM SERPL-SCNC: 142 MMOL/L (ref 136–145)
TRIGL SERPL-MCNC: 87 MG/DL
WBC # BLD AUTO: 6 10E3/UL (ref 4–11)

## 2022-01-24 PROCEDURE — 99213 OFFICE O/P EST LOW 20 MIN: CPT | Mod: 25 | Performed by: FAMILY MEDICINE

## 2022-01-24 PROCEDURE — 80061 LIPID PANEL: CPT | Performed by: FAMILY MEDICINE

## 2022-01-24 PROCEDURE — 36415 COLL VENOUS BLD VENIPUNCTURE: CPT | Performed by: FAMILY MEDICINE

## 2022-01-24 PROCEDURE — 99397 PER PM REEVAL EST PAT 65+ YR: CPT | Performed by: FAMILY MEDICINE

## 2022-01-24 PROCEDURE — 85027 COMPLETE CBC AUTOMATED: CPT | Performed by: FAMILY MEDICINE

## 2022-01-24 PROCEDURE — 80053 COMPREHEN METABOLIC PANEL: CPT | Performed by: FAMILY MEDICINE

## 2022-01-24 RX ORDER — PYRIDOXINE HCL (VITAMIN B6) 100 MG
TABLET ORAL
COMMUNITY
Start: 2022-01-04

## 2022-01-24 RX ORDER — IBUPROFEN 800 MG/1
800 TABLET, FILM COATED ORAL EVERY 8 HOURS PRN
Qty: 90 TABLET | Refills: 3 | Status: SHIPPED | OUTPATIENT
Start: 2022-01-24 | End: 2023-06-02

## 2022-01-24 ASSESSMENT — ACTIVITIES OF DAILY LIVING (ADL): CURRENT_FUNCTION: NO ASSISTANCE NEEDED

## 2022-01-24 ASSESSMENT — MIFFLIN-ST. JEOR: SCORE: 1207.31

## 2022-01-24 NOTE — PROGRESS NOTES
"SUBJECTIVE:   Calin Aguilar is a 66 year old female who presents for Preventive Visit.    Chief Complaint   Patient presents with     Wellness Visit     fasting     Nasal Congestion     nasal drainage, x 1 week     Nasal drainage- using netti pot which helped. Using nose spray and zinc. Going to [place] end of February.. has sinus issues with flying. Having some headaches, with sinus headache. Previously used antibiotics which cleared.     Has had fluctuating bowel movements for years. Notes \"spiked tails\" of her stools. Sometimes they are like paste, other times they are harder. shes working on incorporating more fiber in her diet. shes seen a specialist in the past who recommend pelvic floor PT but she is not interested in this. Denies pencil thin stool, bloody/black stool, notable change of consistency.     Patient has been advised of split billing requirements and indicates understanding: Yes  Are you in the first 12 months of your Medicare coverage?  No    Healthy Habits:     In general, how would you rate your overall health?  Excellent    Frequency of exercise:  4-5 days/week    Duration of exercise:  30-45 minutes    Do you usually eat at least 4 servings of fruit and vegetables a day, include whole grains    & fiber and avoid regularly eating high fat or \"junk\" foods?  Yes    Taking medications regularly:  Yes    Medication side effects:  None    Ability to successfully perform activities of daily living:  No assistance needed    Home Safety:  No safety concerns identified    Hearing Impairment:  No hearing concerns    In the past 6 months, have you been bothered by leaking of urine?  No    In general, how would you rate your overall mental or emotional health?  Good      PHQ-2 Total Score: 1    Additional concerns today:  Yes    Do you feel safe in your environment? Yes    Have you ever done Advance Care Planning? (For example, a Health Directive, POLST, or a discussion with a medical provider or your loved " ones about your wishes): Yes, advance care planning is on file.    Fall risk  Fallen 2 or more times in the past year?: No  Any fall with injury in the past year?: Yes    Cognitive Screening   1) Repeat 3 items (Leader, Season, Table)    2) Clock draw: NORMAL  3) 3 item recall: Recalls 3 objects  Results: 3 items recalled: COGNITIVE IMPAIRMENT LESS LIKELY    Mini-CogTM Copyright MONICA Renteria. Licensed by the author for use in [facility]; reprinted with permission (nirmal@[facility]). All rights reserved.      Reviewed and updated as needed this visit by clinical staff  Tobacco  Allergies  Meds             Reviewed and updated as needed this visit by Provider               Social History     Tobacco Use     Smoking status: Former Smoker     Quit date: 10/19/1987     Years since quittin.2     Smokeless tobacco: Never Used     Tobacco comment: 30 years ago   Substance Use Topics     Alcohol use: Yes     Alcohol/week: 3.0 standard drinks       Alcohol Use 2022   Prescreen: >3 drinks/day or >7 drinks/week? No       Current providers sharing in care for this patient include:   Patient Care Team:  Amarilis Delgado MD as PCP - General  Jazmine Hidalgo DO as Assigned PCP    The following health maintenance items are reviewed in Epic and correct as of today:  Health Maintenance Due   Topic Date Due     ANNUAL REVIEW OF HM ORDERS  Never done     DEXA  2021     FALL RISK ASSESSMENT  2021     MAMMO SCREENING  2022     Lab work is in process    FHS-7:   Breast CA Risk Assessment (FHS-7) 1/3/2022 2022   Did any of your first-degree relatives have breast or ovarian cancer? Yes Yes   Did any of your relatives have bilateral breast cancer? No No   Did any man in your family have breast cancer? No No   Did any woman in your family have breast and ovarian cancer? Yes Yes   Did any woman in your family have breast cancer before age 50 y? No No   Do you have 2 or more relatives with breast  "and/or ovarian cancer? No No   Do you have 2 or more relatives with breast and/or bowel cancer? No No     Pertinent mammograms are reviewed under the imaging tab.    OBJECTIVE:   /84   Pulse 64   Temp 96.8  F (36  C) (Oral)   Ht 1.645 m (5' 4.75\")   Wt 68 kg (150 lb)   BMI 25.15 kg/m   Estimated body mass index is 25.15 kg/m  as calculated from the following:    Height as of this encounter: 1.645 m (5' 4.75\").    Weight as of this encounter: 68 kg (150 lb).  Physical Exam  GENERAL APPEARANCE: healthy, alert and no distress  EYES: Eyes grossly normal to inspection, PERRL and conjunctivae and sclerae normal  HENT: ear canals and TM's normal, nose and mouth without ulcers or lesions, oropharynx clear and oral mucous membranes moist  NECK: no adenopathy, no asymmetry, masses, or scars and thyroid normal to palpation  RESP: lungs clear to auscultation - no rales, rhonchi or wheezes  CV: regular rate and rhythm, normal S1 S2, no S3 or S4, no murmur, click or rub, no peripheral edema   ABDOMEN: soft, nontender, no hepatosplenomegaly, no masses   MS: no musculoskeletal defects are noted and gait is age appropriate without ataxia  SKIN: no suspicious lesions or rashes  NEURO: Normal strength and tone, sensory exam grossly normal, mentation intact and speech normal  PSYCH: mentation appears normal and affect normal/bright    Diagnostic Test Results:  Labs reviewed in Epic    ASSESSMENT / PLAN:   Calin was seen today for wellness visit and nasal congestion.    Diagnoses and all orders for this visit:    Medicare annual wellness visit, subsequent  COUNSELING:  Reviewed preventive health counseling, as reflected in patient instructions  Special attention given to:       Regular exercise       Healthy diet/nutrition       Vision screening       Dental care       Osteoporosis prevention/bone health       Advanced Planning     Appropriate preventive services were discussed with this patient, including applicable screening " as appropriate for cardiovascular disease, diabetes, osteopenia/osteoporosis, and glaucoma.  As appropriate for age/gender, discussed screening for colorectal cancer, prostate cancer, breast cancer, and cervical cancer. Checklist reviewing preventive services available has been given to the patient.    Reviewed patients plan of care and provided an AVS. The Basic Care Plan (routine screening as documented in Health Maintenance) for Calin meets the Care Plan requirement. This Care Plan has been established and reviewed with the Patient.    Identified Health Risks:    She is at risk for falling and has been provided with information to reduce the risk of falling at home.    Essential hypertension  Well controlled on losartan. Monitoring labs today.  -     Comprehensive metabolic panel (BMP + Alb, Alk Phos, ALT, AST, Total. Bili, TP)  -     CBC with platelets    Osteoarthritis, unspecified osteoarthritis type, unspecified site  Stable with PRN ibuprofen, refill provided.   -     ibuprofen (ADVIL/MOTRIN) 800 MG tablet; Take 1 tablet (800 mg) by mouth every 8 hours as needed    Postmenopausal status  History of osteopenia in 2019, repeat DXA recommended. Continue with weight bearing exercise, appropriate vit D/calcium intake.   -     DX Hip/Pelvis/Spine; Future    Lipid screening  -     Lipid Profile (Chol, Trig, HDL, LDL calc)    Postnasal drip  Low concern for sinusitis at this time so recommend symptomatic measures. Reviewed proper astelin nasal spray technique and she will continue with netti pot rinses which have been helpful for her. If symptoms worsen over the next week, could consider antibiotic treatment.     Bowel habit changes  History of fluctuating bowel habits/consistency for many years now. Reviewed 1/2020 colonoscopy which showed one sessile polyp with recommendation to repeat in 3 years. No current red flag concerns so will not repeat colonoscopy at this time. She will continue with fiber supplementation.  She continues to decline pelvic floor PT which has been recommended to her by GI in the past.       Patient has been advised of split billing requirements and indicates understanding: Yes    Sari Boyer DO  [facility]

## 2022-01-24 NOTE — PATIENT INSTRUCTIONS
Patient Education   Personalized Prevention Plan  You are due for the preventive services outlined below.  Your care team is available to assist you in scheduling these services.  If you have already completed any of these items, please share that information with your care team to update in your medical record.  Health Maintenance Due   Topic Date Due     ANNUAL REVIEW OF  ORDERS  Never done     Osteoporosis Screening  12/12/2021     FALL RISK ASSESSMENT  12/17/2021     Mammogram  02/01/2022       Preventing Falls at Home  A person can fall for many reasons. Older adults may fall because reaction time slows down as we age. Your muscles and joints may get stiff, weak, or less flexible because of illness, medicines, or a physical condition.   Other health problems that make falls more likely include:     Arthritis    Dizziness or lightheadedness when you stand up (orthostatic hypotension)    History of a stroke    Dizziness    Anemia    Certain medicines taken for mental illness or to control blood pressure.    Problems with balance or gait    Bladder or urinary problems    History of falling    Changes in vision (vision impairment)    Changes in thinking skills and memory (cognitive impairment)  Injuries from a fall can include serious injuries such as broken bones, dislocated joints, internal bleeding and cuts. Injuries like these can limit your independence.   Prevention tips  To help prevent falls and fall-related injuries, follow the tips below.    Floors  To make floors safer:     Put nonskid pads under area rugs.    Remove small rugs.    Replace worn floor coverings.    Tack carpets firmly to each step on carpeted stairs. Put nonskid strips on the edges of uncarpeted stairs.    Keep floors and stairs free of clutter and cords.    Arrange furniture so there are clear pathways.    Clean up any spills right away.  Bathrooms    To make bathrooms safer:     Install grab bars in the tub or shower.    Apply  nonskid strips or put a nonskid rubber mat in the tub or shower.    Sit on a bath chair to bathe.    Use bathmats with nonskid backing.  Lighting  To improve visibility in your home:      Keep a flashlight in each room. Or put a lamp next to the bed within easy reach.    Put nightlights in the bedrooms, hallways, kitchen, and bathrooms.    Make sure all stairways have good lighting.    Take your time when going up and down stairs.    Put handrails on both sides of stairs and in walkways for more support. To prevent injury to your wrist or arm, don t use handrails to pull yourself up.    Install grab bars to pull yourself up.    Move or rearrange items that you use often. This will make them easier to find or reach.    Look at your home to find any safety hazards. Especially look at doorways, walkways, and the driveway. Remove or repair any safety problems that you find.  Other changes to make    Look around to find any safety hazards. Look closely at doorways, walkways, and the driveway. Remove or repair any safety problems that you find.    Wear shoes that fit well.    Take your time when going up and down stairs.    Put handrails on both sides of stairs and in walkways for more support. To prevent injury to your wrist or arm, don t use handrails to pull yourself up.    Install grab bars wherever needed to pull yourself up.    Arrange items that you use often. This will make them easier to find or reach.    [employer] last reviewed this educational content on 3/1/2020    0492-8673 [employer]. All rights reserved. This information is not intended as a substitute for professional medical care. Always follow your healthcare professional's instructions.

## 2022-02-10 ENCOUNTER — ANCILLARY PROCEDURE (OUTPATIENT)
Dept: MAMMOGRAPHY | Facility: CLINIC | Age: 67
End: 2022-02-10
Attending: FAMILY MEDICINE
Payer: COMMERCIAL

## 2022-02-10 DIAGNOSIS — Z12.31 VISIT FOR SCREENING MAMMOGRAM: ICD-10-CM

## 2022-02-10 PROCEDURE — 77067 SCR MAMMO BI INCL CAD: CPT | Mod: TC | Performed by: RADIOLOGY

## 2022-03-01 ENCOUNTER — TELEPHONE (OUTPATIENT)
Dept: FAMILY MEDICINE | Facility: CLINIC | Age: 67
End: 2022-03-01
Payer: COMMERCIAL

## 2022-03-01 DIAGNOSIS — R10.84 ABDOMINAL PAIN, GENERALIZED: Primary | ICD-10-CM

## 2022-03-28 ENCOUNTER — MYC MEDICAL ADVICE (OUTPATIENT)
Dept: FAMILY MEDICINE | Facility: CLINIC | Age: 67
End: 2022-03-28
Payer: COMMERCIAL

## 2022-04-11 ENCOUNTER — OFFICE VISIT (OUTPATIENT)
Dept: FAMILY MEDICINE | Facility: CLINIC | Age: 67
End: 2022-04-11
Payer: COMMERCIAL

## 2022-04-11 VITALS
DIASTOLIC BLOOD PRESSURE: 82 MMHG | RESPIRATION RATE: 16 BRPM | BODY MASS INDEX: 25.36 KG/M2 | HEART RATE: 72 BPM | SYSTOLIC BLOOD PRESSURE: 134 MMHG | TEMPERATURE: 97.5 F | WEIGHT: 151.25 LBS

## 2022-04-11 DIAGNOSIS — R59.1 LYMPHADENOPATHY: ICD-10-CM

## 2022-04-11 DIAGNOSIS — R53.83 OTHER FATIGUE: Primary | ICD-10-CM

## 2022-04-11 DIAGNOSIS — R45.86 MOOD CHANGE: ICD-10-CM

## 2022-04-11 DIAGNOSIS — R79.9 ABNORMAL FINDING OF BLOOD CHEMISTRY, UNSPECIFIED: ICD-10-CM

## 2022-04-11 DIAGNOSIS — E55.9 VITAMIN D DEFICIENCY: ICD-10-CM

## 2022-04-11 LAB
BASOPHILS # BLD AUTO: 0.1 10E3/UL (ref 0–0.2)
BASOPHILS NFR BLD AUTO: 1 %
EOSINOPHIL # BLD AUTO: 0.2 10E3/UL (ref 0–0.7)
EOSINOPHIL NFR BLD AUTO: 3 %
ERYTHROCYTE [DISTWIDTH] IN BLOOD BY AUTOMATED COUNT: 13 % (ref 10–15)
HCT VFR BLD AUTO: 42.7 % (ref 35–47)
HGB BLD-MCNC: 14 G/DL (ref 11.7–15.7)
IMM GRANULOCYTES # BLD: 0 10E3/UL
IMM GRANULOCYTES NFR BLD: 0 %
IRON SATN MFR SERPL: 36 % (ref 15–46)
IRON SERPL-MCNC: 104 UG/DL (ref 35–180)
LYMPHOCYTES # BLD AUTO: 1.3 10E3/UL (ref 0.8–5.3)
LYMPHOCYTES NFR BLD AUTO: 20 %
MAGNESIUM SERPL-MCNC: 2.1 MG/DL (ref 1.8–2.6)
MCH RBC QN AUTO: 30.4 PG (ref 26.5–33)
MCHC RBC AUTO-ENTMCNC: 32.8 G/DL (ref 31.5–36.5)
MCV RBC AUTO: 93 FL (ref 78–100)
MONOCYTES # BLD AUTO: 0.6 10E3/UL (ref 0–1.3)
MONOCYTES NFR BLD AUTO: 9 %
NEUTROPHILS # BLD AUTO: 4.3 10E3/UL (ref 1.6–8.3)
NEUTROPHILS NFR BLD AUTO: 67 %
NRBC # BLD AUTO: 0 10E3/UL
NRBC BLD AUTO-RTO: 0 /100
PLATELET # BLD AUTO: 253 10E3/UL (ref 150–450)
RBC # BLD AUTO: 4.61 10E6/UL (ref 3.8–5.2)
RETICS # AUTO: 0.07 10E6/UL (ref 0.01–0.11)
RETICS/RBC NFR AUTO: 1.4 % (ref 0.8–2.7)
TIBC SERPL-MCNC: 287 UG/DL (ref 240–430)
TSH SERPL DL<=0.005 MIU/L-ACNC: 1.96 UIU/ML (ref 0.3–5)
VIT B12 SERPL-MCNC: 424 PG/ML (ref 213–816)
WBC # BLD AUTO: 6.4 10E3/UL (ref 4–11)

## 2022-04-11 PROCEDURE — 99215 OFFICE O/P EST HI 40 MIN: CPT | Performed by: FAMILY MEDICINE

## 2022-04-11 PROCEDURE — 85045 AUTOMATED RETICULOCYTE COUNT: CPT | Performed by: FAMILY MEDICINE

## 2022-04-11 PROCEDURE — 86376 MICROSOMAL ANTIBODY EACH: CPT | Performed by: FAMILY MEDICINE

## 2022-04-11 PROCEDURE — 82607 VITAMIN B-12: CPT | Performed by: FAMILY MEDICINE

## 2022-04-11 PROCEDURE — 99000 SPECIMEN HANDLING OFFICE-LAB: CPT | Performed by: FAMILY MEDICINE

## 2022-04-11 PROCEDURE — 83789 MASS SPECTROMETRY QUAL/QUAN: CPT | Mod: 90 | Performed by: FAMILY MEDICINE

## 2022-04-11 PROCEDURE — 85025 COMPLETE CBC W/AUTO DIFF WBC: CPT | Performed by: FAMILY MEDICINE

## 2022-04-11 PROCEDURE — 83735 ASSAY OF MAGNESIUM: CPT | Performed by: FAMILY MEDICINE

## 2022-04-11 PROCEDURE — 84443 ASSAY THYROID STIM HORMONE: CPT | Performed by: FAMILY MEDICINE

## 2022-04-11 PROCEDURE — 83550 IRON BINDING TEST: CPT | Performed by: FAMILY MEDICINE

## 2022-04-11 PROCEDURE — 82306 VITAMIN D 25 HYDROXY: CPT | Performed by: FAMILY MEDICINE

## 2022-04-11 PROCEDURE — 36415 COLL VENOUS BLD VENIPUNCTURE: CPT | Performed by: FAMILY MEDICINE

## 2022-04-11 PROCEDURE — 85060 BLOOD SMEAR INTERPRETATION: CPT | Performed by: PATHOLOGY

## 2022-04-11 RX ORDER — BUPROPION HYDROCHLORIDE 150 MG/1
150 TABLET ORAL EVERY MORNING
Qty: 90 TABLET | Refills: 3 | Status: SHIPPED | OUTPATIENT
Start: 2022-04-11 | End: 2023-04-03

## 2022-04-11 ASSESSMENT — PATIENT HEALTH QUESTIONNAIRE - PHQ9: SUM OF ALL RESPONSES TO PHQ QUESTIONS 1-9: 4

## 2022-04-11 NOTE — PATIENT INSTRUCTIONS
Labs today.    Neck ultrasound and thyroid ultrasound ordered.  Radiology should call you but if they do not call [phone] to set that up at [facility].    Recommend Metamucil daily.    Colonoscopy due Jan. 2023.    I am happy to refer you to pelvic floor PT when you wish. For the stool incontinence.  You can send a [facility]) message if you wish that order.    Annual wellness due Jan. 2023.    We are going to start Wellbutrin or bupropion 150 mg XL.  He wanted take this in the morning.  Limit caffeine while you are on this because it is a bit stimulating.  It should help with your mood and give a bit of energy.  Hold on starting until we know the thyroid lab results.  If that is abnormal, will start a thryoid med instead of the wellbutrin.  But then if low mood persist after 4-6 weeks on thyroid med, then we could add the Wellbutrin.    If needing to help you sleep you can take 1 to 2 mg of melatonin before bed 0.5 to 1 mg in the middle of the night.  This is a very small dose.  Certainly do not take it if it makes her groggy the next day.    Recommend 3 dairy servings a day or calcium with vitamin D twice a day with food.    Ple please see orthopedic surgery for your right shoulder pain.    Covid booster if you wish.    Counseling if you think it would be helpful.  I will do some options below.    [facility]:   [facility] [phone]  [address] [place] [address]   This group also does pediatric ADHD evaluation    [facility]: [phone]  [address], [facility] [address]    [facility]: [phone]  [address], [place] [address]    [facility]:  [phone]   [address] [place] [address]  This group also does pediatric ADHD evaluation    [facility]:  [facility]. [phone] [address]  This group also does pediatric ADHD evaluation    [place]:  [facility]: [phone]  [address]     [place]:  [facility]  [phone]  [address], [place] [address]    [place]:  [place] [facility] [phone]    [address], [facility]  [id number]    [facility] [place] /[phone]   [address], [address]    [facility] [phone] [address] [facility], [address]    [facility] [phone]   [facility] [address]    [facility]:  [facility]  Psychiatrists and Psychologists  [phone]    [facility]:  [place]  Adult Sexual Health Counselors:  Jorge Juares and Vince Jacobsen  [phone]    [facility]:  [facility]  Counselor that specializes in youth sexual health including transgender  Leatha Goode  [phone]

## 2022-04-11 NOTE — PROGRESS NOTES
Assessment & Plan       ICD-10-CM    1. Other fatigue  R53.83 Magnesium     TSH with free T4 reflex     Vitamin B12     Iron and iron binding capacity     Thyroid peroxidase antibody     Magnesium     TSH with free T4 reflex     Vitamin B12     Iron and iron binding capacity     Thyroid peroxidase antibody   2. Mood change  R45.86 Iodine Serum     buPROPion (WELLBUTRIN XL) 150 MG 24 hr tablet     Iodine Serum   3. Lymphadenopathy  R59.1 Lab Blood Morphology Pathologist Review     US Head Neck Soft Tissue     US Thyroid     Lab Blood Morphology Pathologist Review   4. Vitamin D deficiency  E55.9 Vitamin D Deficiency     Vitamin D Deficiency   5. Abnormal finding of blood chemistry, unspecified   R79.9 Iron and iron binding capacity     Iron and iron binding capacity     Labs today.    Neck ultrasound and thyroid ultrasound ordered.  Radiology should call you but if they do not call [phone] to set that up at [facility] [place].    Recommend Metamucil daily.    Colonoscopy due Jan. 2023.    I am happy to refer you to pelvic floor PT when you wish. For the stool incontinence.  You can send a [employer] message if you wish that order.    Annual wellness due Jan. 2023.    We are going to start Wellbutrin or bupropion 150 mg XL.  He wanted take this in the morning.  Limit caffeine while you are on this because it is a bit stimulating.  It should help with your mood and give a bit of energy.  Hold on starting until we know the thyroid lab results.  If that is abnormal, will start a thryoid med instead of the wellbutrin.  But then if low mood persist after 4-6 weeks on thyroid med, then we could add the Wellbutrin.    If needing to help you sleep you can take 1 to 2 mg of melatonin before bed 0.5 to 1 mg in the middle of the night.  This is a very small dose.  Certainly do not take it if it makes her groggy the next day.    Recommend 3 dairy servings a day or calcium with vitamin D twice a day with food.    Ple please see  "orthopedic surgery for your right shoulder pain.    Covid booster if you wish.    Counseling if you think it would be helpful.  I will do some options below.      40 minutes spent on the date of the encounter doing chart review, history and exam, documentation and further activities per the note       BMI:   Estimated body mass index is 25.36 kg/m  as calculated from the following:    Height as of 1/24/22: 1.645 m (5' 4.75\").    Weight as of this encounter: 68.6 kg (151 lb 4 oz).           No follow-ups on file.    Amarilis Delgado MD  [facility]    Padmini Layton is a 67 year old who presents for the following health issues     Mass    History of Present Illness       Reason for visit:  Check thyroid shoulder pain  Symptom onset:  More than a month  Symptoms include:  Mood swings ,tired, forgetfulness,brain fog,  Symptom intensity:  Moderate  Symptom progression:  Staying the same  Had these symptoms before:  Yes  Has tried/received treatment for these symptoms:  No  What makes it worse:  No  What makes it better:  Granddaughters    She eats 2-3 servings of fruits and vegetables daily.She consumes 1 sweetened beverage(s) daily.She exercises with enough effort to increase her heart rate 30 to 60 minutes per day.  She exercises with enough effort to increase her heart rate 6 days per week.   She is taking medications regularly.     Mood changes:  Starting last fall, low mood on and off.  Will have episodes of deep sadness.  Will be more irritable and less social.  No new stressors. Feels it may be hormonal.  No thoughts of hurting herself, killing herself or hurting or killing others.  Years ago she was on an antidepressant (she thinks Citalopram) when son had depression and living out of state about 1123 years ago.  Was on for 3-6 months. Felt like she had \"no feeling\" when on this.    Gets enough sleep.  Sleeping a little more than usual.  Does not nap.  Exercises every day.No " chest pain or shortness of breath.  No anxiety now.  Does wake up sometimes at 4 am and cannot go back to sleep.  Some fatigue in the evening.  Some decreased interest in sex. No anxiety.  Having brain fog on and off.    Lump:  Felt a little more thickness on left side of neck for a couple of months.  No dysphagia. No fever or chills.    Constipation/ stool incontinence:  Will not go for a few days ten will have a large BM.  At times will fell like irrribatle bowel with more active bowel sounds and bowels.  Never diarrhea.  Will have some stool incontinence.  Was looking to see a GI, but cancelled apt.  Doing ok with more fiber.  Has seen GI in the past and told she has incomplete emptying and pelvic PT and has not done that.  Saw colorectal surgery in 2020 shows no she identified  defography showed no sphinter abnormality but she was only able to defecate 50% of the contrast.  They are recommending Metamucil daily and per patient pelvic floor physical therapy.    Right shoulder pain:  Fell down steps 5-6 years ago.          Review of Systems         Objective    /82 (BP Location: Left arm, Patient Position: Sitting, Cuff Size: Adult Regular)   Pulse 72   Temp 97.5  F (36.4  C) (Oral)   Resp 16   Wt 68.6 kg (151 lb 4 oz)   BMI 25.36 kg/m    Body mass index is 25.36 kg/m .  Physical Exam   GENERAL: healthy, alert and no distress  NECK: cervical adenopathy with about a 2 mm soft mobile lymph node left anterior cervical chain and thyroid normal to palpation  PSYCH: mentation appears normal, affect normal/bright

## 2022-04-12 LAB
DEPRECATED CALCIDIOL+CALCIFEROL SERPL-MC: 54 UG/L (ref 20–75)
PATH REPORT.COMMENTS IMP SPEC: NORMAL
PATH REPORT.COMMENTS IMP SPEC: NORMAL
PATH REPORT.FINAL DX SPEC: NORMAL
PATH REPORT.MICROSCOPIC SPEC OTHER STN: NORMAL
PATH REPORT.RELEVANT HX SPEC: NORMAL
THYROPEROXIDASE AB SERPL-ACNC: <3 IU/ML (ref 0–6)

## 2022-04-13 LAB — IODINE SERPL-MCNC: 48 NG/ML (ref 40–92)

## 2022-04-27 ENCOUNTER — HOSPITAL ENCOUNTER (OUTPATIENT)
Dept: ULTRASOUND IMAGING | Facility: HOSPITAL | Age: 67
Discharge: HOME | End: 2022-04-27
Attending: FAMILY MEDICINE
Payer: COMMERCIAL

## 2022-04-27 DIAGNOSIS — R59.1 LYMPHADENOPATHY: ICD-10-CM

## 2022-04-27 PROCEDURE — 76536 US EXAM OF HEAD AND NECK: CPT

## 2022-04-27 PROCEDURE — 76536 US EXAM OF HEAD AND NECK: CPT | Mod: XS

## 2022-04-29 ENCOUNTER — TRANSFERRED RECORDS (OUTPATIENT)
Dept: HEALTH INFORMATION MANAGEMENT | Facility: CLINIC | Age: 67
End: 2022-04-29
Payer: COMMERCIAL

## 2022-05-04 DIAGNOSIS — I10 ESSENTIAL HYPERTENSION: ICD-10-CM

## 2022-05-06 ENCOUNTER — MYC REFILL (OUTPATIENT)
Dept: FAMILY MEDICINE | Facility: CLINIC | Age: 67
End: 2022-05-06
Payer: COMMERCIAL

## 2022-05-06 DIAGNOSIS — I10 ESSENTIAL HYPERTENSION: ICD-10-CM

## 2022-05-06 RX ORDER — LOSARTAN POTASSIUM 25 MG/1
25 TABLET ORAL DAILY
Qty: 90 TABLET | Refills: 3 | Status: SHIPPED | OUTPATIENT
Start: 2022-05-06 | End: 2023-02-13

## 2022-05-06 RX ORDER — LOSARTAN POTASSIUM 25 MG/1
TABLET ORAL
Qty: 90 TABLET | Refills: 1 | Status: SHIPPED | OUTPATIENT
Start: 2022-05-06 | End: 2022-08-10

## 2022-05-26 ENCOUNTER — LAB (OUTPATIENT)
Dept: LAB | Facility: CLINIC | Age: 67
End: 2022-05-26
Payer: COMMERCIAL

## 2022-05-26 ENCOUNTER — VIRTUAL VISIT (OUTPATIENT)
Dept: FAMILY MEDICINE | Facility: CLINIC | Age: 67
End: 2022-05-26

## 2022-05-26 DIAGNOSIS — R30.0 DYSURIA: Primary | ICD-10-CM

## 2022-05-26 DIAGNOSIS — R30.0 DYSURIA: ICD-10-CM

## 2022-05-26 PROBLEM — I10 HTN (HYPERTENSION): Status: ACTIVE | Noted: 2018-10-01

## 2022-05-26 LAB
BACTERIA #/AREA URNS HPF: ABNORMAL /HPF
RBC #/AREA URNS AUTO: ABNORMAL /HPF
SQUAMOUS #/AREA URNS AUTO: ABNORMAL /LPF
WBC #/AREA URNS AUTO: ABNORMAL /HPF

## 2022-05-26 PROCEDURE — 81015 MICROSCOPIC EXAM OF URINE: CPT

## 2022-05-26 PROCEDURE — 99213 OFFICE O/P EST LOW 20 MIN: CPT | Mod: 95 | Performed by: FAMILY MEDICINE

## 2022-05-26 NOTE — RESULT ENCOUNTER NOTE
Note to Staff: please call the patient to explain results and follow-up virtual visit from today   -Urine is normal.  No antibiotics are indicated at this time.  If symptoms do not improve in the next 24 hours then retesting would be recommended

## 2022-05-26 NOTE — PROGRESS NOTES
Calin is a 67 year old who is being evaluated via a billable telephone visit.      What phone number would you like to be contacted at? [phone]  How would you like to obtain your AVS? MyChart    Assessment & Plan   Dysuria  Urinalysis was normal.  We will continue to hydrate and recheck testing if ongoing symptoms.  - UA Macro with Reflex to Micro and Culture - lab collect      Return in about 1 week (around 6/2/2022) for symptoms failing to improve or sooner if worsening.      Thomas Mccray MD      [address] [address]  [place]   Office: [phone]       Subjective   Calin is a 67 year old who presents for the following health issues     HPI     Genitourinary - Female  Onset/Duration: x 1 day ago  Description:   Painful urination (Dysuria): YES           Frequency: YES- last night   Blood in urine (Hematuria): no  Delay in urine (Hesitency): no  Intensity: mild  Progression of Symptoms:  Improving with Azo  Accompanying Signs & Symptoms:  Fever/chills: no  Flank pain: YES - dull ache across the back and right > left   Nausea and vomiting: no  Vaginal symptoms: none  Abdominal/Pelvic Pain: no  History:   History of frequent UTI s: YES- about one every year   History of kidney stones: no  Sexually Active: YES  Possibility of pregnancy: No  Precipitating or alleviating factors: None  Therapies tried and outcome:  Azo  - took one last night       Review of Systems   Constitutional, HEENT, cardiovascular, pulmonary, gi and gu systems are negative, except as otherwise noted.      Objective           Vitals:  No vitals were obtained today due to virtual visit.    Physical Exam   healthy, alert and no distress  PSYCH: Alert and oriented times 3; coherent speech, normal   rate and volume, able to articulate logical thoughts, able   to abstract reason, no tangential thoughts, no hallucinations   or delusions  Her affect is normal  RESP: No cough, no  audible wheezing, able to talk in full sentences  Remainder of exam unable to be completed due to telephone visits        Results for orders placed or performed in visit on 05/26/22   Urine Microscopic     Status: Abnormal   Result Value Ref Range    Bacteria Urine None Seen None Seen /HPF    RBC Urine 0-2 0-2 /HPF /HPF    WBC Urine 0-5 0-5 /HPF /HPF    Squamous Epithelials Urine Few (A) None Seen /LPF    Narrative    Urine Culture not indicated       Phone call duration: 7 minutes

## 2022-05-27 ENCOUNTER — TELEPHONE (OUTPATIENT)
Dept: FAMILY MEDICINE | Facility: CLINIC | Age: 67
End: 2022-05-27
Payer: COMMERCIAL

## 2022-05-27 ENCOUNTER — OFFICE VISIT (OUTPATIENT)
Dept: FAMILY MEDICINE | Facility: CLINIC | Age: 67
End: 2022-05-27
Payer: COMMERCIAL

## 2022-05-27 VITALS
RESPIRATION RATE: 18 BRPM | WEIGHT: 151.24 LBS | SYSTOLIC BLOOD PRESSURE: 123 MMHG | DIASTOLIC BLOOD PRESSURE: 83 MMHG | BODY MASS INDEX: 25.36 KG/M2 | OXYGEN SATURATION: 98 % | TEMPERATURE: 97.8 F | HEART RATE: 74 BPM

## 2022-05-27 DIAGNOSIS — N30.01 ACUTE CYSTITIS WITH HEMATURIA: Primary | ICD-10-CM

## 2022-05-27 DIAGNOSIS — R30.0 DYSURIA: Primary | ICD-10-CM

## 2022-05-27 LAB
ALBUMIN UR-MCNC: NEGATIVE MG/DL
APPEARANCE UR: ABNORMAL
BACTERIA #/AREA URNS HPF: ABNORMAL /HPF
BILIRUB UR QL STRIP: NEGATIVE
COLOR UR AUTO: YELLOW
GLUCOSE UR STRIP-MCNC: NEGATIVE MG/DL
HGB UR QL STRIP: ABNORMAL
KETONES UR STRIP-MCNC: NEGATIVE MG/DL
LEUKOCYTE ESTERASE UR QL STRIP: ABNORMAL
NITRATE UR QL: NEGATIVE
PH UR STRIP: 6 [PH] (ref 5–8)
RBC #/AREA URNS AUTO: >100 /HPF
SP GR UR STRIP: 1.01 (ref 1–1.03)
SQUAMOUS #/AREA URNS AUTO: ABNORMAL /LPF
UROBILINOGEN UR STRIP-ACNC: 0.2 E.U./DL
WBC #/AREA URNS AUTO: >100 /HPF
YEAST #/AREA URNS HPF: ABNORMAL /HPF

## 2022-05-27 PROCEDURE — 87086 URINE CULTURE/COLONY COUNT: CPT | Performed by: EMERGENCY MEDICINE

## 2022-05-27 PROCEDURE — 81001 URINALYSIS AUTO W/SCOPE: CPT | Performed by: EMERGENCY MEDICINE

## 2022-05-27 PROCEDURE — 99213 OFFICE O/P EST LOW 20 MIN: CPT | Performed by: EMERGENCY MEDICINE

## 2022-05-27 PROCEDURE — 87088 URINE BACTERIA CULTURE: CPT | Performed by: EMERGENCY MEDICINE

## 2022-05-27 RX ORDER — NITROFURANTOIN 25; 75 MG/1; MG/1
100 CAPSULE ORAL 2 TIMES DAILY
Qty: 10 CAPSULE | Refills: 0 | Status: SHIPPED | OUTPATIENT
Start: 2022-05-27 | End: 2022-06-01

## 2022-05-27 NOTE — PROGRESS NOTES
Impression:  Acute cystitis with hematuria    Plan:  Macrobid for 5 days, drink plenty of liquids, follow-up with primary care if not improved after the antibiotic course      Chief Complaint:  Patient presents with:  Kidney Problem: Blood in urine - Pt states she was seen for a UTI on 5/26 and everything was negative. This morning she noticed blood in urine         HPI:   Calin Aguilar is a 67 year old female who presents to this clinic for the evaluation of dysuria.  Patient complains of a 2-day history of dysuria urgency and frequency.  Today she developed some hematuria.  No fever.  No abdominal pain.      PMH:   Past Medical History:   Diagnosis Date     Benign neoplasm of colon     Created by Conversion      Colon polyps      Dysuria     Created by Conversion      Full incontinence of feces     Created by Conversion      Impaired fasting glucose     Created by Conversion      Urinary tract infection, site not specified     Created by Conversion      Past Surgical History:   Procedure Laterality Date     HAND SURGERY Left about 2010    For 1st [facility] OA     HAND SURGERY Right 02/2019    For 1st [facility] OA and carpal tunnel     NE COLPOSCOPY,CERVIX W/ADJ VAGINA,W/BX      Description: Colposcopy Cervix With Biopsy(S);  Proc Date: 03/28/1997;  Comments: mild dysplasia with koilocytosis, and endocervical polyp     ZZC LIGATE FALLOPIAN TUBE      Description: Tubal Ligation;  Recorded: 01/02/2009;         ROS:  All other systems negative    Meds:    Current Outpatient Medications:      buPROPion (WELLBUTRIN XL) 150 MG 24 hr tablet, Take 1 tablet (150 mg) by mouth every morning, Disp: 90 tablet, Rfl: 3     cholecalciferol, vitamin D3, 1,000 unit tablet, [CHOLECALCIFEROL, VITAMIN D3, 1,000 UNIT TABLET] Take 1,000 Units by mouth daily., Disp: , Rfl:      ibuprofen (ADVIL/MOTRIN) 800 MG tablet, Take 1 tablet (800 mg) by mouth every 8 hours as needed, Disp: 90 tablet, Rfl: 3     losartan (COZAAR) 25 MG tablet, Take 1 tablet (25  mg) by mouth daily [LOSARTAN (COZAAR) 25 MG TABLET] TAKE 1 TABLET BY MOUTH EVERY DAY, Disp: 90 tablet, Rfl: 3     NON FORMULARY, [NON FORMULARY] Bone up- calcium, Disp: , Rfl:      azelastine (ASTELIN) 137 mcg (0.1 %) nasal spray, [AZELASTINE (ASTELIN) 137 MCG (0.1 %) NASAL SPRAY] Use in each nostril as directed, Disp: 30 mL, Rfl: 4     losartan (COZAAR) 25 MG tablet, TAKE 1 TABLET BY MOUTH EVERY DAY, Disp: 90 tablet, Rfl: 1     magnesium 250 MG tablet, Take 1 tablet by mouth daily (Patient not taking: Reported on 2022), Disp: , Rfl:      Pyridoxine HCl (B-6) 100 MG TABS, , Disp: , Rfl:      Rhubarb (ESTROVEN COMPLETE PO), Take 1 capsule by mouth daily, Disp: , Rfl:      vit A/vit C/vit E/zinc/copper (PRESERVISION AREDS ORAL), [VIT A/VIT C/VIT E/ZINC/COPPER (PRESERVISION AREDS ORAL)] Take by mouth. (Patient not taking: Reported on 2022), Disp: , Rfl:      Zinc 50 MG CAPS, , Disp: , Rfl:         Social:  Social History     Socioeconomic History     Marital status:      Spouse name: Not on file     Number of children: Not on file     Years of education: Not on file     Highest education level: Not on file   Occupational History     Not on file   Tobacco Use     Smoking status: Former Smoker     Quit date: 10/19/1987     Years since quittin.6     Smokeless tobacco: Never Used     Tobacco comment: 30 years ago   Substance and Sexual Activity     Alcohol use: Yes     Alcohol/week: 3.0 standard drinks     Drug use: No     Sexual activity: Not on file   Other Topics Concern     Not on file   Social History Narrative        The 10-year ASCVD risk score (Nacho GREEN Jr., et al., 2013) is: 7%    Values used to calculate the score:      Age: 63 years      Sex: Female      Is Non- : No      Diabetic: No      Tobacco smoker: No      Systolic Blood Pressur e: 126 mmHg      Is BP treated: Yes      HDL Cholesterol: 68 mg/dL      Total Cholesterol: 220 mg/dL       Social Determinants of  Health     Financial Resource Strain: Not on file   Food Insecurity: Not on file   Transportation Needs: Not on file   Physical Activity: Not on file   Stress: Not on file   Social Connections: Not on file   Intimate Partner Violence: Not on file   Housing Stability: Not on file         Physical Exam:  Vitals:    05/27/22 1737   BP: 123/83   Pulse: 74   Resp: 18   Temp: 97.8  F (36.6  C)   TempSrc: Tympanic   SpO2: 98%   Weight: 68.6 kg (151 lb 3.8 oz)      Patient is awake, alert, no distress  Eyes: PERRL, EOMI  Neuro: Normal motor and sensory function in all extremities  Psych: Awake, alert, normally responsive      Results:    Results for orders placed or performed in visit on 05/27/22 (from the past 24 hour(s))   UA macro with reflex to Microscopic and Culture - Clinc Collect    Specimen: Urine, Clean Catch   Result Value Ref Range    Color Urine Yellow Colorless, Straw, Light Yellow, Yellow    Appearance Urine Cloudy (A) Clear    Glucose Urine Negative Negative mg/dL    Bilirubin Urine Negative Negative    Ketones Urine Negative Negative mg/dL    Specific Gravity Urine 1.015 1.005 - 1.030    Blood Urine Moderate (A) Negative    pH Urine 6.0 5.0 - 8.0    Protein Albumin Urine Negative Negative mg/dL    Urobilinogen Urine 0.2 0.2, 1.0 E.U./dL    Nitrite Urine Negative Negative    Leukocyte Esterase Urine Large (A) Negative   Urine Microscopic Exam   Result Value Ref Range    Bacteria Urine Many (A) None Seen /HPF    RBC Urine >100 (A) 0-2 /HPF /HPF    WBC Urine >100 (A) 0-5 /HPF /HPF    Squamous Epithelials Urine Moderate (A) None Seen /LPF    Budding Yeast Urine Few (A) None Seen /HPF              Fabricio Andres MD

## 2022-05-27 NOTE — TELEPHONE ENCOUNTER
Patient came in for urine culture yesterday.  It came back negative but her symptoms have worsened.  She is worried that the results were skewed because she has been taking AZO otc.    Please call to advise patient   Or to schedule for lab

## 2022-05-27 NOTE — TELEPHONE ENCOUNTER
I only see a untie micro, not culture. I will order a UA and Ucx and she can set a lab apt.  If it is positive, will ask her to submit an e-visit to me through [employer] so I can treat her,thanks.  AZO can make Nitrate show up on UA but would not  affect a urine culture.

## 2022-05-27 NOTE — RESULT ENCOUNTER NOTE
Note to Staff: please call the patient to explain results and to check on current symptoms.  Also send a result note if they would like that.     Azo should not interfere with the microscopic examination

## 2022-05-29 LAB
BACTERIA UR CULT: ABNORMAL
BACTERIA UR CULT: ABNORMAL

## 2022-06-21 ENCOUNTER — HOSPITAL ENCOUNTER (OUTPATIENT)
Dept: BONE DENSITY | Facility: HOSPITAL | Age: 67
Discharge: HOME | End: 2022-06-21
Attending: FAMILY MEDICINE | Admitting: FAMILY MEDICINE
Payer: COMMERCIAL

## 2022-06-21 DIAGNOSIS — Z78.0 POSTMENOPAUSAL STATUS: ICD-10-CM

## 2022-06-21 PROCEDURE — 77080 DXA BONE DENSITY AXIAL: CPT

## 2022-07-25 ENCOUNTER — MYC MEDICAL ADVICE (OUTPATIENT)
Dept: FAMILY MEDICINE | Facility: CLINIC | Age: 67
End: 2022-07-25

## 2022-07-25 ENCOUNTER — NURSE TRIAGE (OUTPATIENT)
Dept: FAMILY MEDICINE | Facility: CLINIC | Age: 67
End: 2022-07-25

## 2022-07-25 NOTE — TELEPHONE ENCOUNTER
Yes please triage this.  I have told the patient not to send [employer] messages with new concerns but to please call the triage nurse.  If you think she needs to be seen in the ER or urgent care please send her.  Thank you.  Amarilis Delgado MD

## 2022-07-25 NOTE — TELEPHONE ENCOUNTER
Attempted to call patient to triage at the request of Dr. Delgado after patient reported concerning symptoms in MyChart message. Left message for patient to return call to clinic.    Su Miranda RN

## 2022-07-25 NOTE — TELEPHONE ENCOUNTER
Dr. Delgado - Please see [employer] message. Would you like this triaged further?    Su Miranda RN

## 2022-07-25 NOTE — TELEPHONE ENCOUNTER
Yes I agree I should see her.  Would prefer a 40-minute visit.  You can use reserved spots.  Thank you.  Certainly if any acute chest pain she should call 911.

## 2022-07-25 NOTE — TELEPHONE ENCOUNTER
Called patient and assisted in scheduling with PCP 8/10 at 3PM with a 2:40 arrival time. Advised patient that if the chest pain were to become persistent or accompanied by chest pressure, excessive sweating, nausea, palpitations or tachycardia, or any shortness of breath she should call 911. Patient in agreement with plan and stated understanding.    Su Miranda RN

## 2022-07-25 NOTE — TELEPHONE ENCOUNTER
Patient returned call for triage. Has been having intermittent chest pain for several months now. Only occurs at night when laying down and can typically be relieved by positional change. Pain has woken her from sleep in the past. Pain occurs mid-sternum and can be sharp. Episodes last 5 minutes at the most and occur at least monthly, but unable to identify any triggers. This past Saturday was the most recent episode and was different in that the pain radiated under right breast, patient also felt a tingling sensation in her chin this episode and slight headache. Reports taking aspirin with relief from all symptoms. Denies any shortness of breath, chest pressure, diaphoresis, palpitations/tachycardia. Does report father passing from CAD at 56. Disposition is to be seen today in office, please advise.    Su Miranda RN    Reason for Disposition    Chest pain(s) lasting a few seconds persists > 3 days    Additional Information    Negative: Severe difficulty breathing (e.g., struggling for each breath, speaks in single words)    Negative: Passed out (i.e., fainted, collapsed and was not responding)    Negative: Difficult to awaken or acting confused (e.g., disoriented, slurred speech)    Negative: Shock suspected (e.g., cold/pale/clammy skin, too weak to stand, low BP, rapid pulse)    Negative: Chest pain lasting longer than 5 minutes and ANY of the following:* Over 45 years old* Over 30 years old and at least one cardiac risk factor (e.g., diabetes, high blood pressure, high cholesterol, smoker, or strong family history of heart disease)* History of heart disease (i.e., angina, heart attack, heart failure, bypass surgery, takes nitroglycerin)* Pain is crushing, pressure-like, or heavy    Negative: Heart beating < 50 beats per minute OR > 140 beats per minute    Negative: Visible sweat on face or sweat dripping down face    Negative: Sounds like a life-threatening emergency to the triager    Negative: Followed  "an injury to chest    Negative: SEVERE chest pain    Negative: Pain also in shoulder(s) or arm(s) or jaw    Negative: Difficulty breathing    Negative: Cocaine use within last 3 days    Negative: Major surgery in the past month    Negative: Hip or leg fracture (broken bone) in past month (or had cast on leg or ankle in past month)    Negative: Illness requiring prolonged bedrest in past month (e.g., immobilization, long hospital stay)    Negative: Long-distance travel in past month (e.g., car, bus, train, plane; with trip lasting 6 or more hours)    Negative: History of prior 'blood clot' in leg or lungs (i.e., deep vein thrombosis, pulmonary embolism)    Negative: History of inherited increased risk of blood clots (e.g., Factor 5 Leiden, Anti-thrombin 3, Protein C or Protein S deficiency, Prothrombin mutation)    Negative: Heart beating irregularly or very rapidly    Negative: Chest pain lasting longer than 5 minutes and occurred in last 3 days (72 hours) (Exception: feels exactly the same as previously diagnosed heartburn and has accompanying sour taste in mouth)    Negative: Dizziness or lightheadedness    Negative: Coughing up blood    Negative: Patient sounds very sick or weak to the triager    Negative: Cancer treatment in the past two months (or has cancer now)    Negative: Patient says chest pain feels exactly the same as previously diagnosed 'heartburn'  and  describes burning in chest and accompanying sour taste in mouth    Negative: Chest pain or 'angina' comes and goes and is happening more often (increasing in frequency) or getting worse (increasing in severity) (Exception: chest pains that last only a few seconds)    Negative: Fever > 100.4 F (38.0 C)    Answer Assessment - Initial Assessment Questions  1. LOCATION: \"Where does it hurt?\"        Mid chest, \"breast bone\"  2. RADIATION: \"Does the pain go anywhere else?\" (e.g., into neck, jaw, arms, back)      Right breast and into chin area with the last " "episode, had not radiated prior to this  3. ONSET: \"When did the chest pain begin?\" (Minutes, hours or days)       Several months  4. PATTERN \"Does the pain come and go, or has it been constant since it started?\"  \"Does it get worse with exertion?\"       Intermittent, occurs every couple of weeks or so  5. DURATION: \"How long does it last\" (e.g., seconds, minutes, hours)      5 minutes or until she changes positions  6. SEVERITY: \"How bad is the pain?\"  (e.g., Scale 1-10; mild, moderate, or severe)     - MILD (1-3): doesn't interfere with normal activities      - MODERATE (4-7): interferes with normal activities or awakens from sleep     - SEVERE (8-10): excruciating pain, unable to do any normal activities        Moderate, will waken her from sleep if she is asleep  7. CARDIAC RISK FACTORS: \"Do you have any history of heart problems or risk factors for heart disease?\" (e.g., angina, prior heart attack; diabetes, high blood pressure, high cholesterol, smoker, or strong family history of heart disease)      High BP, former smoker - social smoker (quit 40 years ago), father  of CAD at 56  8. PULMONARY RISK FACTORS: \"Do you have any history of lung disease?\"  (e.g., blood clots in lung, asthma, emphysema, birth control pills)      None   9. CAUSE: \"What do you think is causing the chest pain?\"      None  10. OTHER SYMPTOMS: \"Do you have any other symptoms?\" (e.g., dizziness, nausea, vomiting, sweating, fever, difficulty breathing, cough)        Tingling in the chin, minor headache  11. PREGNANCY: \"Is there any chance you are pregnant?\" \"When was your last menstrual period?\"        N/A    Protocols used: CHEST PAIN-A-OH    "

## 2022-08-10 ENCOUNTER — OFFICE VISIT (OUTPATIENT)
Dept: FAMILY MEDICINE | Facility: CLINIC | Age: 67
End: 2022-08-10
Payer: COMMERCIAL

## 2022-08-10 VITALS
SYSTOLIC BLOOD PRESSURE: 145 MMHG | DIASTOLIC BLOOD PRESSURE: 93 MMHG | RESPIRATION RATE: 16 BRPM | TEMPERATURE: 98.5 F | BODY MASS INDEX: 24.36 KG/M2 | HEART RATE: 82 BPM | WEIGHT: 145.25 LBS

## 2022-08-10 DIAGNOSIS — R07.9 CHEST PAIN, UNSPECIFIED TYPE: Primary | ICD-10-CM

## 2022-08-10 DIAGNOSIS — R09.89 UNEQUAL BLOOD PRESSURE IN UPPER EXTREMITIES: ICD-10-CM

## 2022-08-10 DIAGNOSIS — R13.10 DYSPHAGIA, UNSPECIFIED TYPE: ICD-10-CM

## 2022-08-10 LAB
ANION GAP SERPL CALCULATED.3IONS-SCNC: 14 MMOL/L (ref 7–15)
BUN SERPL-MCNC: 22 MG/DL (ref 8–23)
CALCIUM SERPL-MCNC: 9.9 MG/DL (ref 8.8–10.2)
CHLORIDE SERPL-SCNC: 101 MMOL/L (ref 98–107)
CREAT SERPL-MCNC: 0.89 MG/DL (ref 0.51–0.95)
DEPRECATED HCO3 PLAS-SCNC: 24 MMOL/L (ref 22–29)
ERYTHROCYTE [DISTWIDTH] IN BLOOD BY AUTOMATED COUNT: 12.4 % (ref 10–15)
ERYTHROCYTE [SEDIMENTATION RATE] IN BLOOD BY WESTERGREN METHOD: 8 MM/HR (ref 0–20)
GFR SERPL CREATININE-BSD FRML MDRD: 70 ML/MIN/1.73M2
GLUCOSE SERPL-MCNC: 98 MG/DL (ref 70–99)
HCT VFR BLD AUTO: 43 % (ref 35–47)
HGB BLD-MCNC: 14.4 G/DL (ref 11.7–15.7)
MCH RBC QN AUTO: 31.1 PG (ref 26.5–33)
MCHC RBC AUTO-ENTMCNC: 33.5 G/DL (ref 31.5–36.5)
MCV RBC AUTO: 93 FL (ref 78–100)
PLATELET # BLD AUTO: 233 10E3/UL (ref 150–450)
POTASSIUM SERPL-SCNC: 4.2 MMOL/L (ref 3.4–5.3)
RBC # BLD AUTO: 4.63 10E6/UL (ref 3.8–5.2)
SODIUM SERPL-SCNC: 139 MMOL/L (ref 136–145)
TROPONIN T SERPL HS-MCNC: <6 NG/L
WBC # BLD AUTO: 6.1 10E3/UL (ref 4–11)

## 2022-08-10 PROCEDURE — 93010 ELECTROCARDIOGRAM REPORT: CPT | Performed by: INTERNAL MEDICINE

## 2022-08-10 PROCEDURE — 36415 COLL VENOUS BLD VENIPUNCTURE: CPT | Performed by: FAMILY MEDICINE

## 2022-08-10 PROCEDURE — 91305 COVID-19,PF,PFIZER (12+ YRS): CPT | Performed by: FAMILY MEDICINE

## 2022-08-10 PROCEDURE — 0054A COVID-19,PF,PFIZER (12+ YRS): CPT | Performed by: FAMILY MEDICINE

## 2022-08-10 PROCEDURE — 83880 ASSAY OF NATRIURETIC PEPTIDE: CPT | Performed by: FAMILY MEDICINE

## 2022-08-10 PROCEDURE — 99215 OFFICE O/P EST HI 40 MIN: CPT | Mod: 25 | Performed by: FAMILY MEDICINE

## 2022-08-10 PROCEDURE — 80048 BASIC METABOLIC PNL TOTAL CA: CPT | Performed by: FAMILY MEDICINE

## 2022-08-10 PROCEDURE — 85652 RBC SED RATE AUTOMATED: CPT | Performed by: FAMILY MEDICINE

## 2022-08-10 PROCEDURE — 84484 ASSAY OF TROPONIN QUANT: CPT | Performed by: FAMILY MEDICINE

## 2022-08-10 PROCEDURE — 93005 ELECTROCARDIOGRAM TRACING: CPT | Performed by: FAMILY MEDICINE

## 2022-08-10 PROCEDURE — 85027 COMPLETE CBC AUTOMATED: CPT | Performed by: FAMILY MEDICINE

## 2022-08-10 ASSESSMENT — ANXIETY QUESTIONNAIRES
4. TROUBLE RELAXING: NOT AT ALL
GAD7 TOTAL SCORE: 0
3. WORRYING TOO MUCH ABOUT DIFFERENT THINGS: NOT AT ALL
GAD7 TOTAL SCORE: 0
GAD7 TOTAL SCORE: 0
7. FEELING AFRAID AS IF SOMETHING AWFUL MIGHT HAPPEN: NOT AT ALL
2. NOT BEING ABLE TO STOP OR CONTROL WORRYING: NOT AT ALL
IF YOU CHECKED OFF ANY PROBLEMS ON THIS QUESTIONNAIRE, HOW DIFFICULT HAVE THESE PROBLEMS MADE IT FOR YOU TO DO YOUR WORK, TAKE CARE OF THINGS AT HOME, OR GET ALONG WITH OTHER PEOPLE: NOT DIFFICULT AT ALL
1. FEELING NERVOUS, ANXIOUS, OR ON EDGE: NOT AT ALL
6. BECOMING EASILY ANNOYED OR IRRITABLE: NOT AT ALL
5. BEING SO RESTLESS THAT IT IS HARD TO SIT STILL: NOT AT ALL
7. FEELING AFRAID AS IF SOMETHING AWFUL MIGHT HAPPEN: NOT AT ALL
8. IF YOU CHECKED OFF ANY PROBLEMS, HOW DIFFICULT HAVE THESE MADE IT FOR YOU TO DO YOUR WORK, TAKE CARE OF THINGS AT HOME, OR GET ALONG WITH OTHER PEOPLE?: NOT DIFFICULT AT ALL

## 2022-08-10 ASSESSMENT — PATIENT HEALTH QUESTIONNAIRE - PHQ9
10. IF YOU CHECKED OFF ANY PROBLEMS, HOW DIFFICULT HAVE THESE PROBLEMS MADE IT FOR YOU TO DO YOUR WORK, TAKE CARE OF THINGS AT HOME, OR GET ALONG WITH OTHER PEOPLE: NOT DIFFICULT AT ALL
SUM OF ALL RESPONSES TO PHQ QUESTIONS 1-9: 1
SUM OF ALL RESPONSES TO PHQ QUESTIONS 1-9: 1

## 2022-08-10 NOTE — PROGRESS NOTES
Assessment & Plan       ICD-10-CM    1. Chest pain, unspecified type  R07.9 Troponin T, High Sensitivity     B-Type Natriuretic Peptide ( [facility] Only)     Basic metabolic panel  (Ca, Cl, CO2, Creat, Gluc, K, Na, BUN)     ESR: Erythrocyte sedimentation rate     CBC with platelets     Echocardiogram Exercise Stress     EKG 12-lead, tracing only     XR Chest 2 Views     Troponin T, High Sensitivity     B-Type Natriuretic Peptide ( [facility] Only)     Basic metabolic panel  (Ca, Cl, CO2, Creat, Gluc, K, Na, BUN)     ESR: Erythrocyte sedimentation rate     CBC with platelets     CANCELED: EKG 12-lead complete w/read - Clinics     CANCELED: XR Chest 2 Views   2. Dysphagia, unspecified type  R13.10    3. Unequal blood pressure in upper extremities  R09.89 US Carotid Bilateral     I am going to have you start Pepcid (Famotidine) 20 mg twice a day for 1 month.  If helpful, continue. Play close attention if you have the symptoms at night.  If the symptoms persist and or you have any further swallowing difficulty then I do want to order an endoscopy.    Today were getting an EKG and blood work.    I am ordering a stress echo that will be done up at [facility] and they will call you.    Certainly call 911 if persistent chest pain.    Covid shot today.    Chest x-ray scheduled for 10 AM tomorrow at the [facility].  Please arrive at about 9:50.    Spoke to radiology and they said the best test to start with to look for subclavian steel syndrome or narrowed subclavian artery on one side is a carotid ultrasound.  They will call you but you can also call [phone] to set that up at [facility].    Please monitor your blood pressure and if it is consistently over 140 on top or over 90 on the bottom let me know and I will adjust your losartan.  Nurse apt in 2 weeks.    Wellness visit due in Jan.        40 minutes spent on the date of the encounter doing chart review, history and exam, documentation and further  activities per the note          No follow-ups on file.      Monica BURGOS  This patient seen and examined with the physician's assistant student.  Amarilis Delgado MD  [facility]    Padmini Layton is a 67 year old, presenting for the following health issues:  Chest Pain (Comes and goes at hs x 1 year, between breasts, has spread into chin/jaw area-typically lasts 3-5 minutes)    Patient has been experiencing intermittent substernal chest pain over the past 6 months. She has experienced ~4-5 episodes total. Episodes seem to occur when patient is lying down about to go to bed, however, she has also had episodes that wake her from sleep. Getting out of bed seems to help alleviate the pain.     Patient's most recent episode occurred ~3 weeks ago while she was lying in bed and lasted ~5minutes. Patient's pain was relieved after standing up, however, during this event patient also experienced right sided numbness in her jaw and radiation of chest pain from substernal area to underneath the right breast. Patient states that this was the most severe episode of pain she's experienced and that she was lying on her right side when the pain began. Pain was 9/10 at this time.  1 episode of dysphagia 2 months ago and did not return.    She denies SNYDER, diaphoresis, lower extremity edema, nausea, vomiting, cough, lightheadedness, dizziness, palpitations, facial droop, unilateral weakness, slurred speech, fevers, chills, back pain, rashes, burping, or indigestion.  No pleuritic chest pain.  Denies any erythema, warmth, or tenderness to calves. Is unsure of shoulder pain d/t previous injury to shoulder that causes intermittent pain. Patient takes Ibuprofen ~1x/week for this.     States sometimes she has a bedtime snack such as an apple before going to sleep. Has not been able to track when these episodes are occurring. Had 1 episode of dysphagia ~2 months ago, none since.     OF note,  patient's father  at age 56 d/t MI. Has positive famhx of HLD and HTN. Patient was social smoker in past.       History of Present Illness       Reason for visit:  Pain in chest periodically , tingling in the chin area when pain comes. only at night laying down.  Symptom onset:  More than a month  Symptoms include:  Pain between breasts in chest. tingling in chin , jaw area  Symptom intensity:  Moderate  Symptom progression:  Staying the same  Had these symptoms before:  Yes  What makes it worse:  No  What makes it better:  Changing positions in bed or getting up    She eats 0-1 servings of fruits and vegetables daily.She consumes 0 sweetened beverage(s) daily. She exercises with enough effort to increase her heart rate 6 days per week.   She is taking medications regularly.    Today's PHQ-9         PHQ-9 Total Score: 1    PHQ-9 Q9 Thoughts of better off dead/self-harm past 2 weeks :   Not at all    How difficult have these problems made it for you to do your work, take care of things at home, or get along with other people: Not difficult at all  Today's GUADALUPE-7 Score: 0             Review of Systems   Constitutional, HEENT, pulmonary, gi and gu systems are negative, except as otherwise noted.      Objective    BP (!) 139/90 (BP Location: Left arm, Patient Position: Sitting, Cuff Size: Adult Regular)   Pulse 82   Temp 98.5  F (36.9  C) (Oral)   Resp 16   Wt 65.9 kg (145 lb 4 oz)   BMI 24.36 kg/m    Body mass index is 24.36 kg/m .  Physical Exam   GENERAL: healthy, alert and no distress  RESP: lungs clear to auscultation - no rales, rhonchi or wheezes  CV: regular rate and rhythm, normal S1 S2, no S3 or S4, no murmur, click or rub, no peripheral edema and peripheral pulses strong  MS: no gross musculoskeletal defects noted, no edema    EKG: Normal                .  ..

## 2022-08-10 NOTE — PATIENT INSTRUCTIONS
I am going to have you start Pepcid (Famotidine) 20 mg twice a day for 1 month.  If helpful, continue. Play close attention if you have the symptoms at night.  If the symptoms persist and or you have any further swallowing difficulty then I do want to order an endoscopy.    Today were getting an EKG and blood work.    I am ordering a stress echo that will be done up at [facility] and they will call you.    Certainly call 911 if persistent chest pain.    Covid shot today.    Chest x-ray scheduled for 10 AM tomorrow at the [facility].  Please arrive at about 9:50.    Spoke to radiology and they said the best test to start with to look for subclavian steel syndrome or narrowed subclavian artery on one side is a carotid ultrasound.  They will call you but you can also call [phone] to set that up at [facility].    Please monitor your blood pressure and if it is consistently over 140 on top or over 90 on the bottom let me know and I will adjust your losartan.  Nurse icci in 2 weeks.    Wellness visit due in Jan.

## 2022-08-11 ENCOUNTER — ANCILLARY PROCEDURE (OUTPATIENT)
Dept: GENERAL RADIOLOGY | Facility: CLINIC | Age: 67
End: 2022-08-11
Attending: FAMILY MEDICINE
Payer: COMMERCIAL

## 2022-08-11 ENCOUNTER — HOSPITAL ENCOUNTER (OUTPATIENT)
Dept: ULTRASOUND IMAGING | Facility: HOSPITAL | Age: 67
Discharge: HOME | End: 2022-08-11
Attending: FAMILY MEDICINE | Admitting: FAMILY MEDICINE
Payer: COMMERCIAL

## 2022-08-11 DIAGNOSIS — R09.89 UNEQUAL BLOOD PRESSURE IN UPPER EXTREMITIES: ICD-10-CM

## 2022-08-11 DIAGNOSIS — R07.9 CHEST PAIN, UNSPECIFIED TYPE: ICD-10-CM

## 2022-08-11 LAB
ATRIAL RATE - MUSE: 75 BPM
DIASTOLIC BLOOD PRESSURE - MUSE: NORMAL MMHG
INTERPRETATION ECG - MUSE: NORMAL
P AXIS - MUSE: 55 DEGREES
PR INTERVAL - MUSE: 146 MS
QRS DURATION - MUSE: 72 MS
QT - MUSE: 368 MS
QTC - MUSE: 410 MS
R AXIS - MUSE: 44 DEGREES
SYSTOLIC BLOOD PRESSURE - MUSE: NORMAL MMHG
T AXIS - MUSE: 51 DEGREES
VENTRICULAR RATE- MUSE: 75 BPM

## 2022-08-11 PROCEDURE — 71046 X-RAY EXAM CHEST 2 VIEWS: CPT | Mod: TC | Performed by: RADIOLOGY

## 2022-08-11 PROCEDURE — 93880 EXTRACRANIAL BILAT STUDY: CPT

## 2022-08-12 LAB — BNP SERPL-MCNC: 13 PG/ML (ref 0–117)

## 2022-08-19 ENCOUNTER — HOSPITAL ENCOUNTER (OUTPATIENT)
Dept: CARDIOLOGY | Facility: HOSPITAL | Age: 67
Discharge: HOME | End: 2022-08-19
Attending: FAMILY MEDICINE | Admitting: FAMILY MEDICINE
Payer: COMMERCIAL

## 2022-08-19 DIAGNOSIS — R07.9 CHEST PAIN, UNSPECIFIED TYPE: ICD-10-CM

## 2022-08-19 PROCEDURE — 93321 DOPPLER ECHO F-UP/LMTD STD: CPT | Mod: 26 | Performed by: INTERNAL MEDICINE

## 2022-08-19 PROCEDURE — 93018 CV STRESS TEST I&R ONLY: CPT | Performed by: INTERNAL MEDICINE

## 2022-08-19 PROCEDURE — 93325 DOPPLER ECHO COLOR FLOW MAPG: CPT | Mod: 26 | Performed by: INTERNAL MEDICINE

## 2022-08-19 PROCEDURE — 93017 CV STRESS TEST TRACING ONLY: CPT

## 2022-08-19 PROCEDURE — 93352 ADMIN ECG CONTRAST AGENT: CPT | Performed by: INTERNAL MEDICINE

## 2022-08-19 PROCEDURE — 999N000208 ECHO STRESS ECHOCARDIOGRAM

## 2022-08-19 PROCEDURE — 93016 CV STRESS TEST SUPVJ ONLY: CPT | Performed by: INTERNAL MEDICINE

## 2022-08-19 PROCEDURE — C8928 TTE W OR W/O FOL W/CON,STRES: HCPCS

## 2022-08-19 PROCEDURE — 255N000002 HC RX 255 OP 636: Performed by: FAMILY MEDICINE

## 2022-08-19 PROCEDURE — 93350 STRESS TTE ONLY: CPT | Mod: 26 | Performed by: INTERNAL MEDICINE

## 2022-08-19 RX ADMIN — PERFLUTREN 4 ML: 6.52 INJECTION, SUSPENSION INTRAVENOUS at 14:16

## 2022-08-24 ENCOUNTER — ALLIED HEALTH/NURSE VISIT (OUTPATIENT)
Dept: FAMILY MEDICINE | Facility: CLINIC | Age: 67
End: 2022-08-24
Payer: COMMERCIAL

## 2022-08-24 VITALS — HEART RATE: 77 BPM | SYSTOLIC BLOOD PRESSURE: 136 MMHG | DIASTOLIC BLOOD PRESSURE: 70 MMHG

## 2022-08-24 DIAGNOSIS — I10 ESSENTIAL HYPERTENSION: Primary | ICD-10-CM

## 2022-08-24 PROCEDURE — 99207 PR NO CHARGE NURSE ONLY: CPT

## 2022-08-24 NOTE — PROGRESS NOTES
Follow Up Blood Pressure Check    Calin Aguilar is a 67 year old female recommended to follow up for blood pressure check by Amarilis Delgadoihypertensive medications and adherence were verified: Yes.     Reason for visit: BP elevated at last OV     Medication change at last visit: NONE     Today's Vitals:   Vitals:    08/24/22 1143   BP: 136/70   BP Location: Left arm   Patient Position: Sitting   Cuff Size: Adult Regular   Pulse: 77       Home blood pressure readings brought in today:   NO     Lowest blood pressure today is less than 140/90 and they deny signs or symptoms of new onset: severe headache, fatigue, confusion, vision changes, chest pain, pounding in the chest, neck, ears, irregular heartbeat, difficulty breathing and blood in the urine.  Please inform patient of his/her blood pressure today.  If they are asymptomatic, the patient is to continue current medications.  This message will be routed to their provider, and they will be notified if a change in medication is recommended.      Laura Moody    Current Outpatient Medications   Medication Sig Dispense Refill     azelastine (ASTELIN) 137 mcg (0.1 %) nasal spray [AZELASTINE (ASTELIN) 137 MCG (0.1 %) NASAL SPRAY] Use in each nostril as directed 30 mL 4     buPROPion (WELLBUTRIN XL) 150 MG 24 hr tablet Take 1 tablet (150 mg) by mouth every morning 90 tablet 3     cholecalciferol, vitamin D3, 1,000 unit tablet [CHOLECALCIFEROL, VITAMIN D3, 1,000 UNIT TABLET] Take 1,000 Units by mouth daily.       ibuprofen (ADVIL/MOTRIN) 800 MG tablet Take 1 tablet (800 mg) by mouth every 8 hours as needed 90 tablet 3     losartan (COZAAR) 25 MG tablet Take 1 tablet (25 mg) by mouth daily [LOSARTAN (COZAAR) 25 MG TABLET] TAKE 1 TABLET BY MOUTH EVERY DAY 90 tablet 3     magnesium 250 MG tablet Take 1 tablet by mouth daily       NON FORMULARY [NON FORMULARY] Bone up- calcium       Pyridoxine HCl (B-6) 100 MG TABS        Rhubarb (ESTROVEN COMPLETE PO) Take 1 capsule  by mouth daily       vit A/vit C/vit E/zinc/copper (PRESERVISION AREDS ORAL)

## 2022-09-14 ENCOUNTER — E-VISIT (OUTPATIENT)
Dept: FAMILY MEDICINE | Facility: CLINIC | Age: 67
End: 2022-09-14
Payer: COMMERCIAL

## 2022-09-14 DIAGNOSIS — U07.1 INFECTION DUE TO 2019 NOVEL CORONAVIRUS: Primary | ICD-10-CM

## 2022-09-14 PROCEDURE — 99421 OL DIG E/M SVC 5-10 MIN: CPT | Performed by: FAMILY MEDICINE

## 2022-09-14 RX ORDER — AZELASTINE 1 MG/ML
2 SPRAY, METERED NASAL DAILY
Qty: 30 ML | Refills: 1 | Status: SHIPPED | OUTPATIENT
Start: 2022-09-14 | End: 2023-01-25

## 2022-09-14 RX ORDER — ALBUTEROL SULFATE 90 UG/1
2 AEROSOL, METERED RESPIRATORY (INHALATION) 4 TIMES DAILY PRN
Qty: 18 G | Refills: 1 | Status: SHIPPED | OUTPATIENT
Start: 2022-09-14 | End: 2023-01-25

## 2022-09-15 ENCOUNTER — VIRTUAL VISIT (OUTPATIENT)
Dept: URGENT CARE | Facility: CLINIC | Age: 67
End: 2022-09-15
Payer: COMMERCIAL

## 2022-09-15 DIAGNOSIS — U07.1 CLINICAL DIAGNOSIS OF COVID-19: Primary | ICD-10-CM

## 2022-09-15 PROCEDURE — 99442 PR PHYSICIAN TELEPHONE EVALUATION 11-20 MIN: CPT | Mod: CS

## 2022-09-15 NOTE — PROGRESS NOTES
"Calin is a 67 year old who is being evaluated via a billable telephone visit.      Tested + yesterday. Sx started 9/13.  COVID vaccinated and boosted.  Recent travel for son's wedding over the weekend.  Nasal congestion, cough.   also +    What phone number would you like to be contacted at? cell  How would you like to obtain your AVS? MyChart    Assessment & Plan     Clinical diagnosis of COVID-19    - nirmatrelvir and ritonavir (PAXLOVID) therapy pack; Take 3 tablets by mouth 2 times daily for 5 days (Take 2 Nirmatrelvir tablets and 1 Ritonavir tablet twice daily for 5 days)    COVID-19 positive patient.  Encounter for consideration of medication intervention. Patient does qualify for a prescription. Full discussion with patient including medication options, risks and benefits. Potential drug interactions reviewed with patient.     Treatment Planned Paxlovid RX sent to [facility] [address]    Temporary change to home medications:  None   Discussed potential reduced effect of Wellbutrin while on Paxlovid x 5 days    Estimated body mass index is 24.36 kg/m  as calculated from the following:    Height as of 1/24/22: 1.645 m (5' 4.75\").    Weight as of 8/10/22: 65.9 kg (145 lb 4 oz).  GFR Estimate   Date Value Ref Range Status   08/10/2022 70 >60 mL/min/1.73m2 Final     Comment:     Effective December 21, 2021 eGFRcr in adults is calculated using the 2021 CKD-EPI creatinine equation which includes age and gender (Kalli jennings al., NEJ, DOI: 10.1056/TFLSdq3646064)   12/17/2020 >60 >60 mL/min/1.73m2 Final     Alejandra Whitten MD  Virtual Urgent Care  [facility] VIRTUAL URGENT CARE    Subjective   Calin is a 67 year old, presenting for the following health issues:  No chief complaint on file.      HPI     Tested + yesterday. Sx started 9/13.  COVID vaccinated and boosted.  Recent travel for son's wedding over the weekend.  Nasal congestion, cough.   also +      Review of Systems   Constitutional, HEENT, " cardiovascular, pulmonary, GI, , musculoskeletal, neuro, skin, endocrine and psych systems are negative, except as otherwise noted.      Objective           Vitals:  No vitals were obtained today due to virtual visit.    Physical Exam   healthy, alert and no distress  PSYCH: Alert and oriented times 3; coherent speech, normal   rate and volume, able to articulate logical thoughts, able   to abstract reason, no tangential thoughts, no hallucinations   or delusions  Her affect is normal and pleasant  RESP: No cough, no audible wheezing, able to talk in full sentences  Remainder of exam unable to be completed due to telephone visits        Phone call duration: 11 minutes

## 2022-09-21 ENCOUNTER — E-VISIT (OUTPATIENT)
Dept: FAMILY MEDICINE | Facility: CLINIC | Age: 67
End: 2022-09-21
Payer: COMMERCIAL

## 2022-09-21 DIAGNOSIS — B96.89 ACUTE BACTERIAL SINUSITIS: Primary | ICD-10-CM

## 2022-09-21 DIAGNOSIS — J01.90 ACUTE BACTERIAL SINUSITIS: Primary | ICD-10-CM

## 2022-09-21 PROCEDURE — 99421 OL DIG E/M SVC 5-10 MIN: CPT | Performed by: FAMILY MEDICINE

## 2022-09-21 NOTE — PATIENT INSTRUCTIONS
Sinusitis (Antibiotic Treatment)    The sinuses are air-filled spaces within the bones of the face. They connect to the inside of the nose. Sinusitis is an inflammation of the tissue that lines the sinuses. Sinusitis can occur during a cold. It can also happen due to allergies to pollens and other particles in the air. Sinusitis can cause symptoms of sinus congestion and a feeling of fullness. A sinus infection causes fever, headache, and facial pain. There is often green or yellow fluid draining from the nose or into the back of the throat (post-nasal drip). You have been given antibiotics to treat this condition.   Home care  Take the full course of antibiotics as instructed. Don't stop taking them, even when you feel better.  Drink plenty of water, hot tea, and other liquids as directed by the healthcare provider. This may help thin nasal mucus. It also may help your sinuses drain fluids.  Heat may help soothe painful areas of your face. Use a towel soaked in hot water. Or,  the shower and direct the warm spray onto your face. Using a vaporizer along with a menthol rub at night may also help soothe symptoms.   An expectorant with guaifenesin may help thin nasal mucus and help your sinuses drain fluids. Talk with your provider or pharmacists before taking an over-the-counter (OTC) medicine if you have any questions about it or its side effects..  You can use an OTC decongestant, unless a similar medicine was prescribed to you. Nasal sprays work the fastest. Use one that contains phenylephrine or oxymetazoline. First blow your nose gently. Then use the spray. Don't use these medicines more often than directed on the label. If you do, your symptoms may get worse. You may also take pills that contain pseudoephedrine. Don t use products that combine multiple medicines. This is because side effects may be increased. Read labels. You can also ask the pharmacist for help. (People with high blood pressure  should not use decongestants. They can raise blood pressure.) Talk with your provider or pharmacist if you have any questions about the medicine..  OTC antihistamines may help if allergies contributed to your sinusitis. Talk with your provider or pharmacist if you have any questions about the medicine..  Don't use nasal rinses or irrigation during an acute sinus infection, unless your healthcare provider tells you to. Rinsing may spread the infection to other areas in your sinuses.  Use acetaminophen or ibuprofen to control pain, unless another pain medicine was prescribed to you. If you have chronic liver or kidney disease or ever had a stomach ulcer, talk with your healthcare provider before using these medicines. Never give aspirin to anyone under age 18 who is ill with a fever. It may cause severe liver damage.  Don't smoke. This can make symptoms worse.    Follow-up care  Follow up with your healthcare provider, or as advised.   When to seek medical advice  Call your healthcare provider if any of these occur:   Facial pain or headache that gets worse  Stiff neck  Unusual drowsiness or confusion  Swelling of your forehead or eyelids  Symptoms don't go away in 10 days  Vision problems, such as blurred or double vision  Fever of 100.4 F (38 C) or higher, or as directed by your healthcare provider  Call 911  Call 911 if any of these occur:   Seizure  Trouble breathing  Feeling dizzy or faint  Fingernails, skin or lips look blue, purple , or gray  Prevention  Here are steps you can take to help prevent an infection:   Keep good hand washing habits.  Don t have close contact with people who have sore throats, colds, or other upper respiratory infections.  Don t smoke, and stay away from secondhand smoke.  Stay up to date with of your vaccines.  [employer] last reviewed this educational content on 12/1/2019 2000-2021 [employer]. All rights reserved. This information is not intended as a substitute for  professional medical care. Always follow your healthcare professional's instructions.        Dear Calin Aguilar    After reviewing your responses, I've been able to diagnose you with?a sinus infection caused by bacteria.?     Based on your responses and diagnosis, I have prescribed Augmentin to treat your symptoms. I have sent this to your pharmacy.?     It is also important to stay well hydrated, get lots of rest and take over-the-counter decongestants,?tylenol?or ibuprofen if you?are able to?take those medications per your primary care provider to help relieve discomfort.?     It is important that you take?all of?your prescribed medication even if your symptoms are improving after a few doses.? Taking?all of?your medicine helps prevent the symptoms from returning.?     If your symptoms worsen, you develop severe headache, vomiting, high fever (>102), or are not improving in 7 days, please contact your primary care provider for an appointment or visit any of our convenient Walk-in Care or Urgent Care Centers to be seen which can be found on our website?here.?     Thanks again for choosing?us?as your health care partner,?   ?  Amarilis Delgado MD?

## 2022-10-01 ENCOUNTER — HEALTH MAINTENANCE LETTER (OUTPATIENT)
Age: 67
End: 2022-10-01

## 2022-12-04 ENCOUNTER — E-VISIT (OUTPATIENT)
Dept: FAMILY MEDICINE | Facility: CLINIC | Age: 67
End: 2022-12-04
Payer: COMMERCIAL

## 2022-12-04 DIAGNOSIS — M79.605 PAIN OF LEFT LOWER EXTREMITY: Primary | ICD-10-CM

## 2022-12-04 PROCEDURE — 99421 OL DIG E/M SVC 5-10 MIN: CPT | Performed by: FAMILY MEDICINE

## 2022-12-07 ENCOUNTER — ALLIED HEALTH/NURSE VISIT (OUTPATIENT)
Dept: FAMILY MEDICINE | Facility: CLINIC | Age: 67
End: 2022-12-07
Payer: COMMERCIAL

## 2022-12-07 VITALS — HEART RATE: 84 BPM | SYSTOLIC BLOOD PRESSURE: 129 MMHG | DIASTOLIC BLOOD PRESSURE: 78 MMHG

## 2022-12-07 DIAGNOSIS — Z01.30 BLOOD PRESSURE CHECK: Primary | ICD-10-CM

## 2022-12-07 PROCEDURE — 99207 PR NO CHARGE NURSE ONLY: CPT

## 2022-12-07 NOTE — PROGRESS NOTES
Follow Up Blood Pressure Check    Calin Aguilar is a 67 year old female recommended to follow up for blood pressure check by Amarilis Delgadoihypertensive medications and adherence were verified: Yes.     Reason for visit: Pt request     Medication change at last visit: Pt currently on Losartan     Today's Vitals:   Vitals:    12/07/22 1335   BP: 129/78   BP Location: Right arm   Patient Position: Sitting   Cuff Size: Adult Regular   Pulse: 84           Lowest blood pressure today is less than 140/90 and they deny signs or symptoms     Dolores Belle MA    Current Outpatient Medications   Medication Sig Dispense Refill     albuterol (PROAIR HFA/PROVENTIL HFA/VENTOLIN HFA) 108 (90 Base) MCG/ACT inhaler Inhale 2 puffs into the lungs 4 times daily as needed for shortness of breath / dyspnea, wheezing or other (cough) 18 g 1     azelastine (ASTELIN) 0.1 % nasal spray Spray 2 sprays into both nostrils daily 30 mL 1     azelastine (ASTELIN) 137 mcg (0.1 %) nasal spray [AZELASTINE (ASTELIN) 137 MCG (0.1 %) NASAL SPRAY] Use in each nostril as directed 30 mL 4     buPROPion (WELLBUTRIN XL) 150 MG 24 hr tablet Take 1 tablet (150 mg) by mouth every morning 90 tablet 3     cholecalciferol, vitamin D3, 1,000 unit tablet [CHOLECALCIFEROL, VITAMIN D3, 1,000 UNIT TABLET] Take 1,000 Units by mouth daily.       ibuprofen (ADVIL/MOTRIN) 800 MG tablet Take 1 tablet (800 mg) by mouth every 8 hours as needed 90 tablet 3     losartan (COZAAR) 25 MG tablet Take 1 tablet (25 mg) by mouth daily [LOSARTAN (COZAAR) 25 MG TABLET] TAKE 1 TABLET BY MOUTH EVERY DAY 90 tablet 3     magnesium 250 MG tablet Take 1 tablet by mouth daily       NON FORMULARY [NON FORMULARY] Bone up- calcium       Pyridoxine HCl (B-6) 100 MG TABS        Rhubarb (ESTROVEN COMPLETE PO) Take 1 capsule by mouth daily       vit A/vit C/vit E/zinc/copper (PRESERVISION AREDS ORAL)

## 2022-12-09 ENCOUNTER — TELEPHONE (OUTPATIENT)
Dept: FAMILY MEDICINE | Facility: CLINIC | Age: 67
End: 2022-12-09

## 2022-12-09 NOTE — TELEPHONE ENCOUNTER
Informed patient of message below        Amarilis Delgado MD at 12/7/2022  1:30 PM    Status: Signed   Blood pressure looks good, continue current medications.

## 2022-12-16 ENCOUNTER — TRANSFERRED RECORDS (OUTPATIENT)
Dept: HEALTH INFORMATION MANAGEMENT | Facility: CLINIC | Age: 67
End: 2022-12-16

## 2022-12-29 ENCOUNTER — OFFICE VISIT (OUTPATIENT)
Dept: PHYSICAL MEDICINE AND REHAB | Facility: CLINIC | Age: 67
End: 2022-12-29
Payer: COMMERCIAL

## 2022-12-29 VITALS
OXYGEN SATURATION: 100 % | BODY MASS INDEX: 22.98 KG/M2 | SYSTOLIC BLOOD PRESSURE: 129 MMHG | HEIGHT: 66 IN | WEIGHT: 143 LBS | DIASTOLIC BLOOD PRESSURE: 78 MMHG | HEART RATE: 73 BPM

## 2022-12-29 DIAGNOSIS — M47.816 LUMBAR FACET ARTHROPATHY: Primary | ICD-10-CM

## 2022-12-29 DIAGNOSIS — F40.240 CLAUSTROPHOBIA: ICD-10-CM

## 2022-12-29 DIAGNOSIS — M51.369 DDD (DEGENERATIVE DISC DISEASE), LUMBAR: ICD-10-CM

## 2022-12-29 DIAGNOSIS — M70.62 TROCHANTERIC BURSITIS OF LEFT HIP: ICD-10-CM

## 2022-12-29 PROCEDURE — 99204 OFFICE O/P NEW MOD 45 MIN: CPT | Performed by: PHYSICIAN ASSISTANT

## 2022-12-29 RX ORDER — LORAZEPAM 0.5 MG/1
TABLET ORAL
Qty: 2 TABLET | Refills: 0 | Status: SHIPPED | OUTPATIENT
Start: 2022-12-29 | End: 2023-01-25

## 2022-12-29 ASSESSMENT — PAIN SCALES - GENERAL: PAINLEVEL: MODERATE PAIN (4)

## 2022-12-29 NOTE — PATIENT INSTRUCTIONS
I believe the pain in your back is coming from the arthritis in the joints.    I believe the pain on your hip is related to hip bursitis.    Physical therapy should help with both of these problems.    If the back pain does not improve I would recommend starting the process for radiofrequency ablation (burning the nerves that since the pain coming from the joints in the lower back).    If the hip pain does not improve I recommend a steroid injection (cortisone shot) for bursitis.

## 2022-12-29 NOTE — LETTER
12/29/2022         RE: Calin Aguilar  [address] [facility] [address]        Dear Colleague,    Thank you for referring your patient, Calin Aguilar, to the [facility]. Please see a copy of my visit note below.    ASSESSMENT: Calin Aguilar is a 67 year old female with past medical history significant for hyperlipidemia, hypertension, osteopenia, insomnia, anxiety who presents today for new patient evaluation of 2 areas of pain.  1.  Acute on chronic bilateral low back pain.  An x-ray lumbar spine from 2018 showed lumbar facet arthropathy L3-4, L4-5, L5-S1.  Pain is most consistent with lumbar facet arthropathy.  She had reproduction of pain with lumbar facet loading maneuvers bilaterally.  2.  Chronic left lateral hip pain.  This pain is most consistent with trochanteric bursitis.  - Patient also had some left posterior knee pain 2 weeks ago.  She saw somewhat orthopedics.  They told her she had a Baker's cyst.  That pain has almost completely resolved with oral prednisone.  - Patient neurologically intact.    PLAN:  A shared decision making model was used.  The patient's values and choices were respected.  The following represents what was discussed and decided upon by the physician assistant and the patient.      1.  DIAGNOSTIC TESTS:    -I reviewed lumbar spine flexion-extension x-rays from 2018.  - I ordered an MRI lumbar spine for further evaluation.  Patient has had low back pain for over 20 years.  She endorses worsening of fecal smearing over the past couple of weeks.    2.  PHYSICAL THERAPY: Patient is scheduled to begin physical therapy for her knee at [facility] next week.  I provided a new order that we will send to [facility] so that they also treat her low back and left hip pain.      3.  MEDICATIONS:  - Ativan 0.5 mg, #2 with no refills was prescribed for claustrophobia before her MRI.  -No other changes made to patient's medications.  She can continue Tylenol  and ibuprofen as needed.    4.  INTERVENTIONS: No interventions were ordered.  Patient is going to trial physical therapy first.  - If left lateral hip pain fails to improve I would recommend a left trochanteric bursa injection  - If axial low back pain fails to improve I would likely recommend bilateral L3, 4, 5 medial branch blocks as a work-up for radiofrequency ablation, pending the results of her MRI.    5.  PATIENT EDUCATION: Patient is in agreement the above plan.  All questions were answered.    6.  FOLLOW-UP: I will send the patient a [employer] message with her MRI results.  Patient will follow up with me in 6 weeks to monitor progress with physical therapy.  If she has questions or concerns, she should not hesitate to call.      SUBJECTIVE:  Calin Aguilar  Is a 67 year old female who presents today in consultation at request of Dr. Delgado for new patient evaluation of low back pain and left lateral hip pain.    Patient reports that she has had chronic low back pain for over 20 years.  She states that she gets episodic flares of pain.  Most recent flare began 2 weeks ago.  She denies any injury or event at that time to cause the pain.  Around that same time she also had an increase in her chronic left lateral hip pain and pain at the left posterior knee.  She went to a chiropractor who took x-rays.  They told her she had disc degeneration at L4-5 and L5-S1.  They recommended a treatment plan but she did not pursue it because her insurance does not provide good coverage for chiropractic treatment.  1 week later she went to [facility] because of left posterior knee pain.  They told her she had a Baker's cyst.  They gave her prednisone which provided significant relief of her knee pain and partial relief of her low back and left hip pain.    Patient complains of bilateral low back pain.  Pain spans across the low back in a broadband distribution at the belt line.  Both sides are equally affected.  She  "states that her back feels achy and stiff.  Pain is aggravated with standing too long.  She states her back feels very stiff in the morning.  As she loosens up later in the morning her back pain improves.    Patient also complains of left lateral hip pain.  She does not feel like this pain comes from her back.  Pain is worse when she lies on her left side at night.    Patient denies any numbness or tingling down the legs.  She states that she feels intermittent weakness in the left leg as if it gives way from under her.  Patient reports that she has chronic issues with her bowels.  She has chronic constipation and she goes to \"colon therapy.\"  She states they do a hydrotherapy which is somewhat helpful, but she deals with intermittent bowel incontinence.  She states that recently she has had more fecal smearing.  Denies loss of bladder control.  She does have a history of bladder surgery.  Denies recent fevers, chills, sweats.    As mentioned above, patient went to the chiropractor couple of weeks ago.  They did 1 treatment which was somewhat helpful.  She has never had any spine surgeries or spine injections.  She has not had physical therapy dedicated to her back or hip.  She takes ibuprofen as needed which is somewhat helpful.  She occasionally uses Tylenol but ibuprofen works better.    Current Outpatient Medications   Medication     albuterol (PROAIR HFA/PROVENTIL HFA/VENTOLIN HFA) 108 (90 Base) MCG/ACT inhaler     azelastine (ASTELIN) 0.1 % nasal spray     azelastine (ASTELIN) 137 mcg (0.1 %) nasal spray     buPROPion (WELLBUTRIN XL) 150 MG 24 hr tablet     cholecalciferol, vitamin D3, 1,000 unit tablet     ibuprofen (ADVIL/MOTRIN) 800 MG tablet     losartan (COZAAR) 25 MG tablet     magnesium 250 MG tablet     NON FORMULARY     Pyridoxine HCl (B-6) 100 MG TABS     Rhubarb (ESTROVEN COMPLETE PO)     vit A/vit C/vit E/zinc/copper (PRESERVISION AREDS ORAL)         Allergies   Allergen Reactions     Codeine " Unknown     Nausea     Lisinopril Unknown     Cough and lightheaded       Past Medical History:   Diagnosis Date     Benign neoplasm of colon     Created by Conversion      Colon polyps      Dysuria     Created by Conversion      Full incontinence of feces     Created by Conversion      Impaired fasting glucose     Created by Conversion      Urinary tract infection, site not specified     Created by Conversion         Patient Active Problem List   Diagnosis     Osteopenia     Insomnia     Osteoarthrosis     Anxiety Disorder NOS     Hyperlipidemia     Adenomatous polyp of colon, unspecified part of colon-Multiple adenomatous     History of basal cell carcinoma     HTN (hypertension)       Past Surgical History:   Procedure Laterality Date     HAND SURGERY Left about 2010    For 1st [facility] OA     HAND SURGERY Right 02/2019    For 1st [facility] OA and carpal tunnel     MN COLPOSCOPY,CERVIX W/ADJ VAGINA,W/BX      Description: Colposcopy Cervix With Biopsy(S);  Proc Date: 03/28/1997;  Comments: mild dysplasia with koilocytosis, and endocervical polyp     ZZC LIGATE FALLOPIAN TUBE      Description: Tubal Ligation;  Recorded: 01/02/2009;       Family History   Problem Relation Age of Onset     Breast Cancer Mother 58.00     Hypothyroidism Mother      Osteoporosis Mother      Hypertension Mother      Depression Mother      Hypertension Father      Heart Disease Father      Cerebrovascular Disease Paternal Aunt      Depression Paternal Grandfather      Depression Maternal Grandfather      Anuerysm Paternal Uncle      Diabetes Paternal Grandmother      Hypertension Sister      Ovarian Cancer No family hx of        Social history: Patient is retired.  She worked for [employer].  She admits to alcohol use.  Denies tobacco or illicit drug use.  She is .      ROS: Positive for constipation, loss of bowel control, joint pain, sciatica.  Specifically negative for bowel/bladder dysfunction, fevers,chills, appetite changes, unexplained weight  loss.   Otherwise 13 systems reviewed are negative.  Please see the patient's intake questionnaire from today for details.      OBJECTIVE:  PHYSICAL EXAMINATION:    CONSTITUTIONAL:  Vital signs as above.  No acute distress.  The patient is well nourished and well groomed.  PSYCHIATRIC:  The patient is awake, alert, oriented to person, place, time and answering questions appropriately with clear speech.    HEENT: Normocephalic, atraumatic.  Sclera clear.  Neck is supple.  SKIN:  Skin over the face, bilateral lower extremities, and posterior torso is clean, dry, intact without rashes.    GAIT:  Gait is non-antalgic.  The patient is able to heel and toe walk without significant difficulty.    STANDING EXAMINATION: No significant tenderness palpation bilateral lower lumbar paraspinous muscles.  Lumbar flexion mildly restricted.  Lumbar extension moderately restricted.  Lumbar facet loading maneuvers reproduce low back pain bilaterally.  MUSCLE STRENGTH:  The patient has 5/5 strength for the bilateral hip flexors, knee flexors/extensors, ankle dorsiflexors/plantar flexors, great toe extensors, ankle evertors/invertors.  NEUROLOGICAL: 2+ patellar, and achilles reflexes bilaterally.  Negative Babinski's bilaterally.  No ankle clonus bilaterally. Sensation to light touch is intact in the bilateral L4, L5, and S1 dermatomes.  VASCULAR:  2/4 dorsalis pedis pulses bilaterally.  Bilateral lower extremities are warm.  There is no pitting edema of the bilateral lower extremities.  ABDOMINAL:  Soft, non-distended, non-tender throughout all quadrants.  No pulsatile mass palpated in the left lower quadrant.  LYMPH NODES:  No palpable or tender inguinal lymph nodes.  MUSCULOSKELETAL: No tenderness palpation bilateral posterior knees.  Tender to palpation left greater trochanter.  Straight leg raise negative bilaterally.    RESULTS:    I reviewed lumbar spine flexion-extension x-rays dated March 14, 2018.  This shows 2 mm  retrolisthesis of L2 in both flexion and extension.  There is moderate to advanced facet arthropathy L3-4 through L5-S1.        Again, thank you for allowing me to participate in the care of your patient.        Sincerely,        Miley Nielsen PA-C

## 2023-01-05 ENCOUNTER — HOSPITAL ENCOUNTER (OUTPATIENT)
Dept: MRI IMAGING | Facility: HOSPITAL | Age: 68
Discharge: HOME | End: 2023-01-05
Attending: PHYSICIAN ASSISTANT | Admitting: PHYSICIAN ASSISTANT
Payer: COMMERCIAL

## 2023-01-05 DIAGNOSIS — M47.816 LUMBAR FACET ARTHROPATHY: ICD-10-CM

## 2023-01-05 DIAGNOSIS — M51.369 DDD (DEGENERATIVE DISC DISEASE), LUMBAR: ICD-10-CM

## 2023-01-05 PROCEDURE — 72148 MRI LUMBAR SPINE W/O DYE: CPT

## 2023-01-19 ENCOUNTER — TRANSFERRED RECORDS (OUTPATIENT)
Dept: HEALTH INFORMATION MANAGEMENT | Facility: CLINIC | Age: 68
End: 2023-01-19
Payer: COMMERCIAL

## 2023-01-24 ASSESSMENT — ENCOUNTER SYMPTOMS
DIARRHEA: 0
HEMATURIA: 0
EYE PAIN: 0
JOINT SWELLING: 0
NAUSEA: 1
HEMATOCHEZIA: 0
DIZZINESS: 0
COUGH: 0
SHORTNESS OF BREATH: 0
CONSTIPATION: 1
ABDOMINAL PAIN: 0
HEADACHES: 0
PALPITATIONS: 0
SORE THROAT: 0
BREAST MASS: 0
FREQUENCY: 0
ARTHRALGIAS: 0
CHILLS: 0
FEVER: 0
HEARTBURN: 1
MYALGIAS: 0
PARESTHESIAS: 0
WEAKNESS: 0
DYSURIA: 0
NERVOUS/ANXIOUS: 0

## 2023-01-24 ASSESSMENT — ACTIVITIES OF DAILY LIVING (ADL): CURRENT_FUNCTION: NO ASSISTANCE NEEDED

## 2023-01-25 ENCOUNTER — OFFICE VISIT (OUTPATIENT)
Dept: FAMILY MEDICINE | Facility: CLINIC | Age: 68
End: 2023-01-25
Payer: COMMERCIAL

## 2023-01-25 VITALS
HEART RATE: 65 BPM | HEIGHT: 65 IN | WEIGHT: 149.25 LBS | DIASTOLIC BLOOD PRESSURE: 92 MMHG | SYSTOLIC BLOOD PRESSURE: 143 MMHG | OXYGEN SATURATION: 99 % | RESPIRATION RATE: 16 BRPM | TEMPERATURE: 98 F | BODY MASS INDEX: 24.87 KG/M2

## 2023-01-25 DIAGNOSIS — E55.9 VITAMIN D DEFICIENCY: ICD-10-CM

## 2023-01-25 DIAGNOSIS — Z00.00 ENCOUNTER FOR MEDICARE ANNUAL WELLNESS EXAM: Primary | ICD-10-CM

## 2023-01-25 DIAGNOSIS — M94.9 DISORDER OF BONE AND CARTILAGE: ICD-10-CM

## 2023-01-25 DIAGNOSIS — N81.10 VAGINAL PROLAPSE: ICD-10-CM

## 2023-01-25 DIAGNOSIS — Z85.828 HISTORY OF BASAL CELL CARCINOMA: ICD-10-CM

## 2023-01-25 DIAGNOSIS — M51.369 DDD (DEGENERATIVE DISC DISEASE), LUMBAR: ICD-10-CM

## 2023-01-25 DIAGNOSIS — I10 ESSENTIAL HYPERTENSION: ICD-10-CM

## 2023-01-25 DIAGNOSIS — R73.01 IMPAIRED FASTING GLUCOSE: ICD-10-CM

## 2023-01-25 DIAGNOSIS — J31.0 CHRONIC RHINITIS: ICD-10-CM

## 2023-01-25 DIAGNOSIS — E78.5 HYPERLIPIDEMIA LDL GOAL <130: ICD-10-CM

## 2023-01-25 DIAGNOSIS — F39 UNSPECIFIED MOOD (AFFECTIVE) DISORDER (H): ICD-10-CM

## 2023-01-25 DIAGNOSIS — M89.9 DISORDER OF BONE AND CARTILAGE: ICD-10-CM

## 2023-01-25 LAB
ALBUMIN SERPL BCG-MCNC: 4.2 G/DL (ref 3.5–5.2)
ALP SERPL-CCNC: 71 U/L (ref 35–104)
ALT SERPL W P-5'-P-CCNC: 26 U/L (ref 10–35)
ANION GAP SERPL CALCULATED.3IONS-SCNC: 12 MMOL/L (ref 7–15)
AST SERPL W P-5'-P-CCNC: 29 U/L (ref 10–35)
BILIRUB SERPL-MCNC: 0.4 MG/DL
BUN SERPL-MCNC: 19 MG/DL (ref 8–23)
CALCIUM SERPL-MCNC: 9.3 MG/DL (ref 8.8–10.2)
CHLORIDE SERPL-SCNC: 105 MMOL/L (ref 98–107)
CHOLEST SERPL-MCNC: 218 MG/DL
CREAT SERPL-MCNC: 0.78 MG/DL (ref 0.51–0.95)
DEPRECATED HCO3 PLAS-SCNC: 24 MMOL/L (ref 22–29)
ERYTHROCYTE [DISTWIDTH] IN BLOOD BY AUTOMATED COUNT: 12.3 % (ref 10–15)
GFR SERPL CREATININE-BSD FRML MDRD: 82 ML/MIN/1.73M2
GLUCOSE SERPL-MCNC: 90 MG/DL (ref 70–99)
HBA1C MFR BLD: 5.7 % (ref 0–5.6)
HCT VFR BLD AUTO: 40.5 % (ref 35–47)
HDLC SERPL-MCNC: 68 MG/DL
HGB BLD-MCNC: 13.5 G/DL (ref 11.7–15.7)
LDLC SERPL CALC-MCNC: 132 MG/DL
MCH RBC QN AUTO: 30.7 PG (ref 26.5–33)
MCHC RBC AUTO-ENTMCNC: 33.3 G/DL (ref 31.5–36.5)
MCV RBC AUTO: 92 FL (ref 78–100)
NONHDLC SERPL-MCNC: 150 MG/DL
PLATELET # BLD AUTO: 233 10E3/UL (ref 150–450)
POTASSIUM SERPL-SCNC: 4.4 MMOL/L (ref 3.4–5.3)
PROT SERPL-MCNC: 6.8 G/DL (ref 6.4–8.3)
RBC # BLD AUTO: 4.4 10E6/UL (ref 3.8–5.2)
SODIUM SERPL-SCNC: 141 MMOL/L (ref 136–145)
TRIGL SERPL-MCNC: 88 MG/DL
TSH SERPL DL<=0.005 MIU/L-ACNC: 2.68 UIU/ML (ref 0.3–4.2)
WBC # BLD AUTO: 5.2 10E3/UL (ref 4–11)

## 2023-01-25 PROCEDURE — 82306 VITAMIN D 25 HYDROXY: CPT | Performed by: FAMILY MEDICINE

## 2023-01-25 PROCEDURE — G0438 PPPS, INITIAL VISIT: HCPCS | Performed by: FAMILY MEDICINE

## 2023-01-25 PROCEDURE — 87624 HPV HI-RISK TYP POOLED RSLT: CPT | Performed by: FAMILY MEDICINE

## 2023-01-25 PROCEDURE — 36415 COLL VENOUS BLD VENIPUNCTURE: CPT | Performed by: FAMILY MEDICINE

## 2023-01-25 PROCEDURE — 84443 ASSAY THYROID STIM HORMONE: CPT | Performed by: FAMILY MEDICINE

## 2023-01-25 PROCEDURE — 85027 COMPLETE CBC AUTOMATED: CPT | Performed by: FAMILY MEDICINE

## 2023-01-25 PROCEDURE — 80053 COMPREHEN METABOLIC PANEL: CPT | Performed by: FAMILY MEDICINE

## 2023-01-25 PROCEDURE — G0145 SCR C/V CYTO,THINLAYER,RESCR: HCPCS | Performed by: FAMILY MEDICINE

## 2023-01-25 PROCEDURE — 83036 HEMOGLOBIN GLYCOSYLATED A1C: CPT | Performed by: FAMILY MEDICINE

## 2023-01-25 PROCEDURE — 80061 LIPID PANEL: CPT | Performed by: FAMILY MEDICINE

## 2023-01-25 PROCEDURE — 99214 OFFICE O/P EST MOD 30 MIN: CPT | Mod: 25 | Performed by: FAMILY MEDICINE

## 2023-01-25 RX ORDER — AZELASTINE 1 MG/ML
SPRAY, METERED NASAL
Qty: 30 ML | Refills: 4 | Status: SHIPPED | OUTPATIENT
Start: 2023-01-25 | End: 2024-03-25

## 2023-01-25 RX ORDER — LOSARTAN POTASSIUM 50 MG/1
50 TABLET ORAL DAILY
Qty: 90 TABLET | Refills: 3 | Status: SHIPPED | OUTPATIENT
Start: 2023-01-25 | End: 2023-02-13

## 2023-01-25 ASSESSMENT — ENCOUNTER SYMPTOMS
PARESTHESIAS: 0
HEARTBURN: 1
DIZZINESS: 0
CHILLS: 0
HEMATURIA: 0
ARTHRALGIAS: 0
FREQUENCY: 0
JOINT SWELLING: 0
SHORTNESS OF BREATH: 0
FEVER: 0
CONSTIPATION: 1
EYE PAIN: 0
NERVOUS/ANXIOUS: 0
DYSURIA: 0
DIARRHEA: 0
HEMATOCHEZIA: 0
HEADACHES: 0
SORE THROAT: 0
ABDOMINAL PAIN: 0
NAUSEA: 1
BREAST MASS: 0
MYALGIAS: 0
WEAKNESS: 0
PALPITATIONS: 0
COUGH: 0

## 2023-01-25 ASSESSMENT — ACTIVITIES OF DAILY LIVING (ADL): CURRENT_FUNCTION: NO ASSISTANCE NEEDED

## 2023-01-25 NOTE — PATIENT INSTRUCTIONS
Blood pressure is elevated today at 143/92.  I would like to increase your losartan from 25 mg up to 50 mg daily.  You can take 2 of the 25 mg tabs together until it is gone and I am sending a new prescription for 50 mg daily.    My nurse will help set a blood pressure check here in 2 weeks.    I am glad you see dermatology routinely with history of skin cancer.    On bupropion for mood.    Please ask your eye doctor about the persistent redness in the middle left eye.    You have some mild vaginal prolapse.  Nothing you have to do about it unless it bothers you.Refilled Astelin nasal spray.      Patient Education   Personalized Prevention Plan  Health Maintenance   Topic Date Due    COVID-19 Vaccine (5 - Booster for Moderna series) Declined    COLORECTAL CANCER SCREENING  Just had this week, due in 5 years    MEDICARE ANNUAL WELLNESS VISIT  Today    MAMMO SCREENING  Scheduled    ANNUAL REVIEW OF HM ORDERS  Today    FALL RISK ASSESSMENT  Today    DEXA  06/21/2024    LIPID  Today    ADVANCE CARE PLANNING  We would like a copy please    DTAP/TDAP/TD IMMUNIZATION (4 - Td or Tdap) 10/10/2029    PHQ-2 (once per calendar year)  Completed    INFLUENZA VACCINE  Completed    Pneumococcal Vaccine: 65+ Years  Completed    ZOSTER IMMUNIZATION  Completed    IPV IMMUNIZATION  Aged Out    MENINGITIS IMMUNIZATION  Aged Out    HEPATITIS C SCREENING  NA

## 2023-01-25 NOTE — PROGRESS NOTES
"SUBJECTIVE:   Calin is a 67 year old who presents for Preventive Visit.  Patient has been advised of split billing requirements and indicates understanding: Yes  Are you in the first 12 months of your Medicare coverage?  No    Healthy Habits:     In general, how would you rate your overall health?  Excellent    Frequency of exercise:  4-5 days/week    Duration of exercise:  15-30 minutes    Do you usually eat at least 4 servings of fruit and vegetables a day, include whole grains    & fiber and avoid regularly eating high fat or \"junk\" foods?  Yes    Taking medications regularly:  Yes    Medication side effects:  Other    Ability to successfully perform activities of daily living:  No assistance needed    Home Safety:  No safety concerns identified    Hearing Impairment:  No hearing concerns    In the past 6 months, have you been bothered by leaking of urine?  No    In general, how would you rate your overall mental or emotional health?  Good      PHQ-2 Total Score: 0    Additional concerns today:  No    Mood:  Improved since last year.  Does have some more symptoms on gray days.    Hy    Left leg pain:  For a long time.  Saw chiropractor and told bursisit and sciatica.  Then pain behind left knee and saw ortho and told Bakers cyst.  Doing PT for leg and back at spine clinic.    Back:  Ongoing issue, had MRI and per patient told degenerative disc disease.  Seeing the spine clinic.    OA IP joint of thumbs:  Has had cortisone injections and had apt set with hand surgeon at [place].    Fall: Oct.  Not unsteady alwayssina hurry.  She had popped a blood vessel which seem to improve but still has some pink discoloration in the medial left eye.         Have you ever done Advance Care Planning? Yes, patient states has an Advance Care Planning document and will bring a copy to the clinic.       Fall risk  Fallen 2 or more times in the past year?: No  Any fall with injury in the past year?: No    Cognitive Screening   1) " Repeat 3 items (Leader, Season, Table)    2) Clock draw: ABNORMAL 0  3) 3 item recall: Recalls 3 objects  Results: 3 items recalled, 0 for abnormal clock    Mini-CogTM Copyright MONICA Renteria. Licensed by the author for use in [facility]; reprinted with permission (nirmal@[facility]). All rights reserved.      Do you have sleep apnea, excessive snoring or daytime drowsiness?: none known    Reviewed and updated as needed this visit by clinical staff   Tobacco  Allergies  Meds              Reviewed and updated as needed this visit by Provider                 Social History     Tobacco Use     Smoking status: Former     Types: Cigarettes     Quit date: 10/19/1987     Years since quittin.2     Smokeless tobacco: Never     Tobacco comments:     30 years ago   Substance Use Topics     Alcohol use: Yes     Alcohol/week: 3.0 standard drinks         Alcohol Use 2023   Prescreen: >3 drinks/day or >7 drinks/week? No               Current providers sharing in care for this patient include: Patient Care Team:  Amarilis Delgado MD as PCP - General (Family Medicine)  Tian Bal MD as MD (Gastroenterology)  Sari Boyer DO as Assigned PCP    The following health maintenance items are reviewed in Epic and correct as of today:  Health Maintenance   Topic Date Due     COVID-19 Vaccine (5 - Booster for Moderna series) Declined     COLORECTAL CANCER SCREENING  Just had this week, due in 5 years     MEDICARE ANNUAL WELLNESS VISIT  Today     MAMMO SCREENING  Scheduled     ANNUAL REVIEW OF HM ORDERS  Today     FALL RISK ASSESSMENT  Today     DEXA  2024     LIPID  Today     ADVANCE CARE PLANNING  We would like a copy please     DTAP/TDAP/TD IMMUNIZATION (4 - Td or Tdap) 10/10/2029     PHQ-2 (once per calendar year)  Completed     INFLUENZA VACCINE  Completed     Pneumococcal Vaccine: 65+ Years  Completed     ZOSTER IMMUNIZATION  Completed     IPV IMMUNIZATION  Aged Out     MENINGITIS IMMUNIZATION  Aged Out      HEPATITIS C SCREENING  NA     Pap:  Today    Lab work is in process  See orders    FHS-7:   Breast CA Risk Assessment (FHS-7) 1/3/2022 1/21/2022 2/10/2022 1/25/2023   Did any of your first-degree relatives have breast or ovarian cancer? Yes Yes Yes Yes   Did any of your relatives have bilateral breast cancer? No No No Unknown   Did any man in your family have breast cancer? No No No -   Did any woman in your family have breast and ovarian cancer? Yes Yes No -   Did any woman in your family have breast cancer before age 50 y? No No No -   Do you have 2 or more relatives with breast and/or ovarian cancer? No No No -   Do you have 2 or more relatives with breast and/or bowel cancer? No No No -       Mammogram Screening: Recommended mammography every 1-2 years with patient discussion and risk factor consideration  Pertinent mammograms are reviewed under the imaging tab.    Review of Systems   Constitutional: Negative for chills and fever.   HENT: Negative for congestion, ear pain, hearing loss and sore throat.    Eyes: Negative for pain and visual disturbance.   Respiratory: Negative for cough and shortness of breath.    Cardiovascular: Negative for chest pain, palpitations and peripheral edema.   Gastrointestinal: Positive for constipation, heartburn and nausea. Negative for abdominal pain, diarrhea and hematochezia.   Breasts:  Negative for tenderness, breast mass and discharge.   Genitourinary: Negative for dysuria, frequency, genital sores, hematuria, pelvic pain, urgency, vaginal bleeding and vaginal discharge.   Musculoskeletal: Negative for arthralgias, joint swelling and myalgias.   Skin: Negative for rash.   Neurological: Negative for dizziness, weakness, headaches and paresthesias.   Psychiatric/Behavioral: Positive for mood changes. The patient is not nervous/anxious.          OBJECTIVE:   BP (!) 145/87 (BP Location: Left arm, Patient Position: Sitting, Cuff Size: Adult Regular)   Pulse 65   Temp 98  F  "(36.7  C) (Oral)   Resp 16   Ht 1.645 m (5' 4.76\")   Wt 67.7 kg (149 lb 4 oz)   SpO2 99%   BMI 25.02 kg/m   Estimated body mass index is 25.02 kg/m  as calculated from the following:    Height as of this encounter: 1.645 m (5' 4.76\").    Weight as of this encounter: 67.7 kg (149 lb 4 oz).  Physical Exam  GENERAL: healthy, alert and no distress  EYES: , PERRL and conjunctivae and sclerae normal, Eyes grossly normal to inspection except slightly pink medial left eye  HENT: ear canals and TM's normal, nose and mouth without ulcers or lesions  NECK: no adenopathy, no asymmetry, masses, or scars and thyroid normal to palpation  RESP: lungs clear to auscultation - no rales, rhonchi or wheezes  BREAST: normal without masses, tenderness or nipple discharge and no palpable axillary masses or adenopathy  CV: regular rate and rhythm, normal S1 S2, no S3 or S4, no murmur, click or rub, no peripheral edema and peripheral pulses strong  ABDOMEN: soft, nontender, no hepatosplenomegaly, no masses and bowel sounds normal   (female): normal female external genitalia, normal urethral meatus, vaginal mucosa pink, moist, well rugated, and normal cervix/adnexa/uterus without masses or discharge, Mild vaginal prolapse  MS: no gross musculoskeletal defects noted, no edema  SKIN: no suspicious lesions or rashes  NEURO: Normal strength and tone, mentation intact and speech normal  PSYCH: mentation appears normal, affect normal/bright    Diagnostic Test Results:  Labs reviewed in Epic    ASSESSMENT / PLAN:       ICD-10-CM    1. Encounter for Medicare annual wellness exam  Z00.00 PRIMARY CARE FOLLOW-UP SCHEDULING     Pap screen with HPV - recommended age 30 - 65 years      2. Chronic rhinitis  J31.0 azelastine (ASTELIN) 0.1 % nasal spray      3. Unspecified mood (affective) disorder (H)  F39 TSH with free T4 reflex     Vitamin D Deficiency     TSH with free T4 reflex     Vitamin D Deficiency      4. History of basal cell carcinoma  " "Z85.828       5. Hyperlipidemia LDL goal <130  E78.5 Lipid Profile     Lipid Profile      6. Essential hypertension  I10 Comprehensive metabolic panel     CBC with platelets     losartan (COZAAR) 50 MG tablet     Comprehensive metabolic panel     CBC with platelets      7. Osteopenia  M89.9     M94.9       8. Impaired fasting glucose  R73.01 Hemoglobin A1c     Hemoglobin A1c      9. Vitamin D deficiency  E55.9 Vitamin D Deficiency     Vitamin D Deficiency      10. Vaginal prolapse  N81.10       11. DDD (degenerative disc disease), lumbar  M51.36         Blood pressure is elevated today at 143/92.  I would like to increase your losartan from 25 mg up to 50 mg daily.  You can take 2 of the 25 mg tabs together until it is gone and I am sending a new prescription for 50 mg daily.    My nurse will help set a blood pressure check here in 2 weeks.    I am glad you see dermatology routinely with history of skin cancer.    On bupropion for mood.    Please ask your eye doctor about the persistent redness in the middle left eye.    You have some mild vaginal prolapse.  Nothing you have to do about it unless it bothers you.Refilled Astelin nasal spray.    Patient has been advised of split billing requirements and indicates understanding: Yes      COUNSELING:  Reviewed preventive health counseling, as reflected in patient instructions       Advanced Planning       BMI:   Estimated body mass index is 25.02 kg/m  as calculated from the following:    Height as of this encounter: 1.645 m (5' 4.76\").    Weight as of this encounter: 67.7 kg (149 lb 4 oz).         She reports that she quit smoking about 35 years ago. Her smoking use included cigarettes. She has never used smokeless tobacco.      Appropriate preventive services were discussed with this patient, including applicable screening as appropriate for cardiovascular disease, diabetes, osteopenia/osteoporosis, and glaucoma.  As appropriate for age/gender, discussed screening for " colorectal cancer, prostate cancer, breast cancer, and cervical cancer. Checklist reviewing preventive services available has been given to the patient.    Reviewed patients plan of care and provided an AVS. The Basic Care Plan (routine screening as documented in Health Maintenance) for Calin meets the Care Plan requirement. This Care Plan has been established and reviewed with the Patient.      Amarilis Delgado MD  [facility]    Identified Health Risks:

## 2023-01-26 LAB — DEPRECATED CALCIDIOL+CALCIFEROL SERPL-MC: 45 UG/L (ref 20–75)

## 2023-01-29 LAB
BKR LAB AP GYN ADEQUACY: NORMAL
BKR LAB AP GYN INTERPRETATION: NORMAL
BKR LAB AP HPV REFLEX: NORMAL
BKR LAB AP PREVIOUS ABNORMAL: NORMAL
PATH REPORT.COMMENTS IMP SPEC: NORMAL
PATH REPORT.COMMENTS IMP SPEC: NORMAL
PATH REPORT.RELEVANT HX SPEC: NORMAL

## 2023-01-31 LAB
HUMAN PAPILLOMA VIRUS 16 DNA: NEGATIVE
HUMAN PAPILLOMA VIRUS 18 DNA: NEGATIVE
HUMAN PAPILLOMA VIRUS FINAL DIAGNOSIS: NORMAL
HUMAN PAPILLOMA VIRUS OTHER HR: NEGATIVE

## 2023-02-01 PROBLEM — Z12.4 CERVICAL CANCER SCREENING: Status: ACTIVE | Noted: 2023-02-01

## 2023-02-03 NOTE — PROGRESS NOTES
Assessment:   Calin Aguilar is a 67 year old y.o. female with past medical history significant for  hyperlipidemia, hypertension, osteopenia, insomnia, anxiety  who presents today for follow-up regarding 2 areas of pain.  1.  Acute on chronic bilateral low back pain.  My review of an MRI lumbar spine shows multilevel lumbar spondylosis.  There is no high-grade spinal canal or neuroforaminal stenosis.  Pain is most consistent with lumbar facet arthropathy.  Patient reports greater than 50% improvement in her pain with physical therapy.  2. Chronic left lateral hip pain.  This pain is most consistent with trochanteric bursitis.  This pain has also improved with physical therapy.       Plan:     A shared decision making plan was used.  The patient's values and choices were respected.  The following represents what was discussed and decided upon by the physician assistant and the patient.      1.  DIAGNOSTIC TESTS:    -I reviewed the MRI lumbar spine.  No additional diagnostic test were ordered.    2.  PHYSICAL THERAPY: Patient recently completed 5-6 sessions of physical therapy at [facility].  Encouraged to keep doing her home exercises.    3.  MEDICATIONS: No changes are made to the patient's medications.  - Encouraged patient to use Tylenol as needed.  - Patient reports blood pressure has been elevated so I recommended she limit her use of ibuprofen.  - We did discuss Voltaren gel as another option.    4.  INTERVENTIONS: No interventions were ordered today.  Patient has made good progress with physical therapy.  - If axial low back pain flares back up/fails to improve I would recommend bilateral L3, 4, 5 medial branch blocks as a work-up for radiofrequency ablation.      5.  PATIENT EDUCATION:  Patient is in agreement the above plan.  All questions were answered.    6.  FOLLOW-UP: Patient is going to follow-up with me as needed.  If she has questions or concerns, she should not hesitate to call.    Subjective:      Calin Aguilar is a 67 year old female who presents today for follow-up regarding acute on chronic low back pain and left lateral hip pain.  I saw the patient in consultation December 29, 2022.  I ordered an MRI lumbar spine.  She returns today to review those results.  Patient reports greater than 50% improvement in her pain with physical therapy.    Patient complains of mild residual centralized low back pain.  Pain is equally severe on both sides.  She states that her back feels stiff.  Once in a while she feels nerve type pain in the lower back, but denies nerve pain down the legs.  Left lateral hip pain has improved.  Denies pain further down the legs.  She rates her pain today as a 0 out of 10.  At its worst it is an 8-10.  At its best it is a 0 out of 10.  Pain is aggravated with standing too long and alleviated with doing exercises.  She denies any new symptoms since she was last seen.    Treatment to date:  - Chiropractic treatment December 2022 which was somewhat helpful  - Physical therapy x5-6 sessions at [facility].  She does her home exercises.  - Ibuprofen as needed which is helpful    Review of Systems:  Positive for weakness, loss of bowel control, wetting pants.  Negative for numbness/tingling, loss of bladder control, inability to urinate, headache, pain much worse at night, trip/double/falls, difficulty swallowing, difficulty with hand skills, fevers, unintentional weight loss.     Objective:   CONSTITUTIONAL:  Vital signs as above.  No acute distress.  The patient is well nourished and well groomed.    PSYCHIATRIC:  The patient is awake, alert, oriented to person, place and time.  The patient is answering questions appropriately with clear speech.  Normal affect.  HEENT: Normocephalic, atraumatic.  Sclera clear.    SKIN:  Skin over the face, posterior torso, bilateral upper and lower extremities is clean, dry, intact without rashes.  VASCULAR: No significant lower extremity  edema.  MUSCULOSKELETAL:  Gait is non-antalgic.  The patient is able to heel and toe walk without any difficulty.    The patient has 5/5 strength for the bilateral hip flexors, knee flexors/extensors, ankle dorsiflexors/plantar flexors.  NEUROLOGICAL:  \ Sensation to light touch is intact in the bilateral L4, L5, and S1 dermatomes.       RESULTS: I reviewed the MRI lumbar spine from [facility] dated January 5, 2023.  At L3-4 there is a disc bulge and mild to moderate facet arthropathy with mild bilateral lateral recess stenosis and minimal bilateral foraminal stenosis.  At L4-5 there is a disc bulge and moderate facet arthropathy with mild bilateral lateral recess stenosis and minimal bilateral foraminal stenosis.  At L5-S1 there is grade 1 retrolisthesis.  There is a circumferential annular bulge and mild facet arthropathy with low-grade left greater than right lateral recess stenosis and mild right and mild to moderate left foraminal stenosis.

## 2023-02-07 ENCOUNTER — OFFICE VISIT (OUTPATIENT)
Dept: PHYSICAL MEDICINE AND REHAB | Facility: CLINIC | Age: 68
End: 2023-02-07
Payer: COMMERCIAL

## 2023-02-07 VITALS
DIASTOLIC BLOOD PRESSURE: 81 MMHG | WEIGHT: 149 LBS | BODY MASS INDEX: 24.83 KG/M2 | HEIGHT: 65 IN | SYSTOLIC BLOOD PRESSURE: 131 MMHG | HEART RATE: 69 BPM

## 2023-02-07 DIAGNOSIS — M47.816 LUMBAR FACET ARTHROPATHY: Primary | ICD-10-CM

## 2023-02-07 DIAGNOSIS — M51.369 DDD (DEGENERATIVE DISC DISEASE), LUMBAR: ICD-10-CM

## 2023-02-07 DIAGNOSIS — M70.62 TROCHANTERIC BURSITIS OF LEFT HIP: ICD-10-CM

## 2023-02-07 PROCEDURE — 99214 OFFICE O/P EST MOD 30 MIN: CPT | Performed by: PHYSICIAN ASSISTANT

## 2023-02-07 ASSESSMENT — PAIN SCALES - GENERAL: PAINLEVEL: NO PAIN (0)

## 2023-02-07 NOTE — LETTER
2/7/2023         RE: Calin Aguilar  [address] [place] [address]        Dear Colleague,    Thank you for referring your patient, Calin Aguilar, to the [facility]. Please see a copy of my visit note below.    Assessment:   Calin Aguilar is a 67 year old y.o. female with past medical history significant for  hyperlipidemia, hypertension, osteopenia, insomnia, anxiety  who presents today for follow-up regarding 2 areas of pain.  1.  Acute on chronic bilateral low back pain.  My review of an MRI lumbar spine shows multilevel lumbar spondylosis.  There is no high-grade spinal canal or neuroforaminal stenosis.  Pain is most consistent with lumbar facet arthropathy.  Patient reports greater than 50% improvement in her pain with physical therapy.  2. Chronic left lateral hip pain.  This pain is most consistent with trochanteric bursitis.  This pain has also improved with physical therapy.       Plan:     A shared decision making plan was used.  The patient's values and choices were respected.  The following represents what was discussed and decided upon by the physician assistant and the patient.      1.  DIAGNOSTIC TESTS:    -I reviewed the MRI lumbar spine.  No additional diagnostic test were ordered.    2.  PHYSICAL THERAPY: Patient recently completed 5-6 sessions of physical therapy at [facility].  Encouraged to keep doing her home exercises.    3.  MEDICATIONS: No changes are made to the patient's medications.  - Encouraged patient to use Tylenol as needed.  - Patient reports blood pressure has been elevated so I recommended she limit her use of ibuprofen.  - We did discuss Voltaren gel as another option.    4.  INTERVENTIONS: No interventions were ordered today.  Patient has made good progress with physical therapy.  - If axial low back pain flares back up/fails to improve I would recommend bilateral L3, 4, 5 medial branch blocks as a work-up for radiofrequency ablation.      5.   PATIENT EDUCATION:  Patient is in agreement the above plan.  All questions were answered.    6.  FOLLOW-UP: Patient is going to follow-up with me as needed.  If she has questions or concerns, she should not hesitate to call.    Subjective:     Calin Aguilar is a 67 year old female who presents today for follow-up regarding acute on chronic low back pain and left lateral hip pain.  I saw the patient in consultation December 29, 2022.  I ordered an MRI lumbar spine.  She returns today to review those results.  Patient reports greater than 50% improvement in her pain with physical therapy.    Patient complains of mild residual centralized low back pain.  Pain is equally severe on both sides.  She states that her back feels stiff.  Once in a while she feels nerve type pain in the lower back, but denies nerve pain down the legs.  Left lateral hip pain has improved.  Denies pain further down the legs.  She rates her pain today as a 0 out of 10.  At its worst it is an 8-10.  At its best it is a 0 out of 10.  Pain is aggravated with standing too long and alleviated with doing exercises.  She denies any new symptoms since she was last seen.    Treatment to date:  - Chiropractic treatment December 2022 which was somewhat helpful  - Physical therapy x5-6 sessions at [facility].  She does her home exercises.  - Ibuprofen as needed which is helpful    Review of Systems:  Positive for weakness, loss of bowel control, wetting pants.  Negative for numbness/tingling, loss of bladder control, inability to urinate, headache, pain much worse at night, trip/double/falls, difficulty swallowing, difficulty with hand skills, fevers, unintentional weight loss.     Objective:   CONSTITUTIONAL:  Vital signs as above.  No acute distress.  The patient is well nourished and well groomed.    PSYCHIATRIC:  The patient is awake, alert, oriented to person, place and time.  The patient is answering questions appropriately with clear speech.   Normal affect.  HEENT: Normocephalic, atraumatic.  Sclera clear.    SKIN:  Skin over the face, posterior torso, bilateral upper and lower extremities is clean, dry, intact without rashes.  VASCULAR: No significant lower extremity edema.  MUSCULOSKELETAL:  Gait is non-antalgic.  The patient is able to heel and toe walk without any difficulty.    The patient has 5/5 strength for the bilateral hip flexors, knee flexors/extensors, ankle dorsiflexors/plantar flexors.  NEUROLOGICAL:  \ Sensation to light touch is intact in the bilateral L4, L5, and S1 dermatomes.       RESULTS: I reviewed the MRI lumbar spine from [facility] dated January 5, 2023.  At L3-4 there is a disc bulge and mild to moderate facet arthropathy with mild bilateral lateral recess stenosis and minimal bilateral foraminal stenosis.  At L4-5 there is a disc bulge and moderate facet arthropathy with mild bilateral lateral recess stenosis and minimal bilateral foraminal stenosis.  At L5-S1 there is grade 1 retrolisthesis.  There is a circumferential annular bulge and mild facet arthropathy with low-grade left greater than right lateral recess stenosis and mild right and mild to moderate left foraminal stenosis.          Again, thank you for allowing me to participate in the care of your patient.        Sincerely,        Miley Nielsen PA-C

## 2023-02-07 NOTE — PATIENT INSTRUCTIONS
I am so happy that you are feeling better!  Keep up the great work with your physical therapy exercises.    If your pain returns or if you have any other questions or concerns please send me a [employer] message or call our nurse line at [phone].

## 2023-02-08 ENCOUNTER — TRANSFERRED RECORDS (OUTPATIENT)
Dept: HEALTH INFORMATION MANAGEMENT | Facility: CLINIC | Age: 68
End: 2023-02-08

## 2023-02-13 ENCOUNTER — ANCILLARY PROCEDURE (OUTPATIENT)
Dept: MAMMOGRAPHY | Facility: CLINIC | Age: 68
End: 2023-02-13
Attending: FAMILY MEDICINE
Payer: COMMERCIAL

## 2023-02-13 ENCOUNTER — MYC MEDICAL ADVICE (OUTPATIENT)
Dept: FAMILY MEDICINE | Facility: CLINIC | Age: 68
End: 2023-02-13
Payer: COMMERCIAL

## 2023-02-13 DIAGNOSIS — Z12.31 VISIT FOR SCREENING MAMMOGRAM: ICD-10-CM

## 2023-02-13 PROCEDURE — 77067 SCR MAMMO BI INCL CAD: CPT

## 2023-02-13 NOTE — TELEPHONE ENCOUNTER
No RNs are available at our clinic so I am sending this to the [facility] connection triage.    Could you call this patient  for some more information?  Has she been seen for this?  I am not quite sure what she is referring to.  Thank you.  Amarilis Delgado MD

## 2023-02-16 ENCOUNTER — ALLIED HEALTH/NURSE VISIT (OUTPATIENT)
Dept: FAMILY MEDICINE | Facility: CLINIC | Age: 68
End: 2023-02-16
Payer: COMMERCIAL

## 2023-02-16 VITALS — HEART RATE: 72 BPM | DIASTOLIC BLOOD PRESSURE: 79 MMHG | SYSTOLIC BLOOD PRESSURE: 120 MMHG

## 2023-02-16 DIAGNOSIS — Z01.30 BP CHECK: Primary | ICD-10-CM

## 2023-02-16 PROCEDURE — 99207 PR NO CHARGE NURSE ONLY: CPT

## 2023-02-16 RX ORDER — LOSARTAN POTASSIUM 50 MG/1
50 TABLET ORAL DAILY
COMMUNITY
End: 2023-07-13

## 2023-02-16 NOTE — PROGRESS NOTES
Follow Up Blood Pressure Check    Calin Aguilar is a 67 year old female recommended to follow up for blood pressure check by Amarilis Delgadoihypertensive medications and adherence were verified: Yes.     Reason for visit: Elevated BP last OV 1/25/23   /92 !    Medication change at last visit: Losartan increased to 50 mg daily.    Today's Vitals:   Vitals:    02/16/23 1023   BP: 120/79   Pulse: 72     Lowest blood pressure today is less than 140/90 and they deny signs or symptoms of new onset: severe headache, fatigue, confusion, vision changes, chest pain, pounding in the chest, neck, ears, irregular heartbeat, difficulty breathing and blood in the urine.  Please inform patient of his/her blood pressure today.  If they are asymptomatic, the patient is to continue current medications.  This message will be routed to their provider, and they will be notified if a change in medication is recommended.      Current Outpatient Medications   Medication Sig Dispense Refill     losartan (COZAAR) 50 MG tablet Take 50 mg by mouth daily       azelastine (ASTELIN) 0.1 % nasal spray 2 sprays each nostril daily. 30 mL 4     buPROPion (WELLBUTRIN XL) 150 MG 24 hr tablet Take 1 tablet (150 mg) by mouth every morning 90 tablet 3     cholecalciferol, vitamin D3, 1,000 unit tablet [CHOLECALCIFEROL, VITAMIN D3, 1,000 UNIT TABLET] Take 1,000 Units by mouth daily.       ibuprofen (ADVIL/MOTRIN) 800 MG tablet Take 1 tablet (800 mg) by mouth every 8 hours as needed 90 tablet 3     NON FORMULARY [NON FORMULARY] Bone up- calcium       Pyridoxine HCl (B-6) 100 MG TABS        vit A/vit C/vit E/zinc/copper (PRESERVISION AREDS ORAL)

## 2023-04-03 ENCOUNTER — OFFICE VISIT (OUTPATIENT)
Dept: FAMILY MEDICINE | Facility: CLINIC | Age: 68
End: 2023-04-03
Payer: COMMERCIAL

## 2023-04-03 VITALS
SYSTOLIC BLOOD PRESSURE: 131 MMHG | BODY MASS INDEX: 25.16 KG/M2 | RESPIRATION RATE: 20 BRPM | DIASTOLIC BLOOD PRESSURE: 86 MMHG | OXYGEN SATURATION: 98 % | WEIGHT: 147.4 LBS | HEART RATE: 79 BPM | HEIGHT: 64 IN | TEMPERATURE: 98.8 F

## 2023-04-03 DIAGNOSIS — R15.9 INCONTINENCE OF FECES, UNSPECIFIED FECAL INCONTINENCE TYPE: Primary | ICD-10-CM

## 2023-04-03 DIAGNOSIS — R45.86 MOOD CHANGE: ICD-10-CM

## 2023-04-03 PROCEDURE — 99213 OFFICE O/P EST LOW 20 MIN: CPT | Performed by: PHYSICIAN ASSISTANT

## 2023-04-03 RX ORDER — L.ACIDOPHIL/L.PLANTAR/BIFIDO 7 15B CELL
CAPSULE ORAL
COMMUNITY

## 2023-04-03 RX ORDER — BUPROPION HYDROCHLORIDE 150 MG/1
TABLET ORAL
Qty: 90 TABLET | Refills: 3 | Status: SHIPPED | OUTPATIENT
Start: 2023-04-03 | End: 2023-09-27

## 2023-04-03 NOTE — TELEPHONE ENCOUNTER
"Last Written Prescription Date:  4/11/22  Last Fill Quantity: 90,  # refills: 3   Last office visit provider:  4/3/23     Requested Prescriptions   Pending Prescriptions Disp Refills     buPROPion (WELLBUTRIN XL) 150 MG 24 hr tablet [Pharmacy Med Name: BUPROPION HCL  MG TABLET] 90 tablet 3     Sig: TAKE 1 TABLET BY MOUTH EVERY MORNING       SSRIs Protocol Passed - 4/3/2023  8:39 AM        Passed - Recent (12 mo) or future (30 days) visit within the authorizing provider's specialty     Patient has had an office visit with the authorizing provider or a provider within the authorizing providers department within the previous 12 mos or has a future within next 30 days. See \"Patient Info\" tab in inbasket, or \"Choose Columns\" in Meds & Orders section of the refill encounter.              Passed - Medication is Bupropion     If the medication is Bupropion (Wellbutrin), and the patient is taking for smoking cessation; OK to refill.          Passed - Medication is active on med list        Passed - Patient is age 18 or older        Passed - No active pregnancy on record        Passed - No positive pregnancy test in last 12 months             HONORIO WEAVER RN 04/03/23 11:28 AM  "

## 2023-04-03 NOTE — TELEPHONE ENCOUNTER
Patient here to see Brooke VANG.  She is wondering if this prescription will be filled for a year?

## 2023-04-03 NOTE — PROGRESS NOTES
Assessment & Plan     Incontinence of feces, unspecified fecal incontinence type  Chronic issue, suspect she is having some overflow diarrhea from constipation.  Given chronicity of this, will refer to GI.  In interim recommend daily Miralax to see if having regular, soft BMs helps improve her symptoms.  Follow up if symptoms worsen or do not improve.  - Adult GI  Referral - Consult Only; Future             Risks, benefits and alternatives were discussed with patient. Agreeable to the plan of care.      Brooke Lizarraga PA-C  [facility]    Padmini Layton is a 68 year old, presenting for the following health issues:  Bowel issues (Started about 11 years ago.  Has had a least two tests done and was told that she does not empty her bowels.  She does has trouble with constipation.  Getting worse over the last couple of years.  Now having issues with control and leaking.  Sometimes worse than other times.)        4/3/2023    10:52 AM   Additional Questions   Roomed by JENNIFER Tabor CMA([facility])     History of Present Illness       Reason for visit:  Bowel leakage    She eats 2-3 servings of fruits and vegetables daily.She consumes 0 sweetened beverage(s) daily.She exercises with enough effort to increase her heart rate 30 to 60 minutes per day.  She exercises with enough effort to increase her heart rate 6 days per week.   She is taking medications regularly.     Calin is here today concerned about ongoing fecal issues  She notes she has fecal incontinence, associate with constipation  She states the leakage doesn't occur every day but often is worse once she passes a bowel movement  This has been ongoing for years, maybe 11 years in total  She has had multiple colonoscopies, has had her rectal sphincter has been tested in the past without issues  She is taking probiotic and Metamucil without much improvemen  Is requesting GI referral          Review of Systems  "  Constitutional, HEENT, cardiovascular, pulmonary, gi and gu systems are negative, except as otherwise noted.      Objective    /86   Pulse 79   Temp 98.8  F (37.1  C) (Oral)   Resp 20   Ht 1.613 m (5' 3.5\")   Wt 66.9 kg (147 lb 6.4 oz)   SpO2 98%   BMI 25.70 kg/m    Body mass index is 25.7 kg/m .  Physical Exam   GENERAL: healthy, alert and no distress  NECK: no adenopathy, no asymmetry, masses, or scars and thyroid normal to palpation  RESP: lungs clear to auscultation - no rales, rhonchi or wheezes  CV: regular rate and rhythm, normal S1 S2, no S3 or S4, no murmur, click or rub, no peripheral edema and peripheral pulses strong  ABDOMEN: soft, nontender, no hepatosplenomegaly, no masses and bowel sounds normal  MS: no gross musculoskeletal defects noted, no edema                    "

## 2023-04-06 NOTE — TELEPHONE ENCOUNTER
REFERRAL INFORMATION:    Referring Provider:  Brooke Lizarraga PA-C    Referring Clinic:  [facility]    Reason for Visit/Diagnosis: Incontinence of feces, unspecified fecal incontinence type     FUTURE VISIT INFORMATION:    Appointment Date: 5/11/2023    Appointment Time:      NOTES STATUS DETAILS   OFFICE NOTE from Referring Provider Internal 4/3/2023 OV with DEVONTE Lizarraga   OFFICE NOTE from Other Specialist Internal 1/24/2022 OV with STEVE Boyer  10/10/2019, 4/4/2019 OV with MAGGIE Delgado   HOSPITAL DISCHARGE SUMMARY/  ED VISITS N/A    OPERATIVE REPORT N/A    MEDICATION LIST Internal         ENDOSCOPY  N/A    COLONOSCOPY Care Everywhere 1/19/2023, 1/16/2020, 1/10/2019   ERCP N/A    EUS N/A    STOOL TESTING N/A    PERTINENT LABS N/A    PATHOLOGY REPORTS (RELATED) N/A    IMAGING (CT, MRI, EGD, MRCP, Small Bowel Follow Through/SBT, MR/CT Enterography) N/A      Request sent to [facility] for records fax [phone]   Records received , on desk in

## 2023-04-10 ENCOUNTER — TELEPHONE (OUTPATIENT)
Dept: GASTROENTEROLOGY | Facility: CLINIC | Age: 68
End: 2023-04-10
Payer: COMMERCIAL

## 2023-04-11 NOTE — TELEPHONE ENCOUNTER
Pt is leaving out of town this Friday, 4/14/2023, and won't be back until 4/27/2023. Pt was okay with keeping appointment as scheduled on 5/11/2023.

## 2023-04-11 NOTE — TELEPHONE ENCOUNTER
[facility] Call Center    Phone Message    May a detailed message be left on voicemail: yes     Reason for Call: Other: Pt calling to let Anabel know she would only be available for a sooner visit after 04/27 as she will be a trip. Please advise. Thank you!     Action Taken: Message routed to:  [facility] ([facility] GI    Travel Screening: Not Applicable

## 2023-05-11 ENCOUNTER — OFFICE VISIT (OUTPATIENT)
Dept: GASTROENTEROLOGY | Facility: CLINIC | Age: 68
End: 2023-05-11
Attending: PHYSICIAN ASSISTANT
Payer: COMMERCIAL

## 2023-05-11 ENCOUNTER — PRE VISIT (OUTPATIENT)
Dept: GASTROENTEROLOGY | Facility: CLINIC | Age: 68
End: 2023-05-11

## 2023-05-11 VITALS
BODY MASS INDEX: 25.51 KG/M2 | SYSTOLIC BLOOD PRESSURE: 107 MMHG | DIASTOLIC BLOOD PRESSURE: 75 MMHG | HEIGHT: 64 IN | WEIGHT: 149.4 LBS | HEART RATE: 80 BPM | OXYGEN SATURATION: 98 %

## 2023-05-11 DIAGNOSIS — K59.09 CHRONIC CONSTIPATION WITH OVERFLOW: Primary | ICD-10-CM

## 2023-05-11 DIAGNOSIS — R15.9 INCONTINENCE OF FECES, UNSPECIFIED FECAL INCONTINENCE TYPE: ICD-10-CM

## 2023-05-11 PROCEDURE — 99203 OFFICE O/P NEW LOW 30 MIN: CPT | Performed by: PHYSICIAN ASSISTANT

## 2023-05-11 RX ORDER — POLYETHYLENE GLYCOL 3350 17 G/17G
1 POWDER, FOR SOLUTION ORAL DAILY
COMMUNITY

## 2023-05-11 ASSESSMENT — PAIN SCALES - GENERAL: PAINLEVEL: NO PAIN (0)

## 2023-05-11 NOTE — LETTER
5/11/2023         RE: Calin Aguilar  [address] [facility] [address]        Dear Colleague,    Thank you for referring your patient, Calin Aguilar, to the [facility]. Please see a copy of my visit note below.    NEW PATIENT GI CLINIC VISIT    CC/REFERRING MD:  Brooke Lizarraga  REASON FOR CONSULTATION:   Brooke Lizarraga for   Chief Complaint   Patient presents with     New Patient     New consult for fecal incontinence and constipation.       ASSESSMENT/PLAN: Patient here for evaluation of chronic constipation with intermittent fecal incontinence.  We spent some time reviewing her symptoms.  She has had colonoscopy as well as pelvic floor testing on a couple of different occasions that have otherwise been normal and potentially notable for incomplete evacuation.  Based on her description of symptoms, I am highly suspicious for constipation with overflow.  She had limited effect with MiraLAX, so we will have her start on Linzess once daily.  We did review that this will result in more frequent bowel movements, the goal is to have soft but formed, easily passed stool on a consistent basis.  The hope would be is that this will prevent further episodes of overflow and will help retrain her pelvic floor and rectal vault muscles to give her a better push with each BM.  Plan for follow-up in 2 months, sooner as needed.      RTC 2 months    Thank you for this consultation.  It was a pleasure to participate in the care of this patient; please contact us with any further questions.      30 minutes spent on the date of the encounter doing chart review, patient visit and documentation    This note was created with voice recognition software, and while reviewed for accuracy, typos may remain.     Mack Garcia PA-C  Division of Gastroenterology, Hepatology and Nutrition  [facility]  Calin Aguilar is a 68 year old female with a past medical  history significant for depression, anxiety, lumbar degenerative disc disease, hypertension, and hyperlipidemia that is seen as a new patient in the GI clinic today for chronic constipation and fecal incontinence.  Patient describes roughly 12-year history of experiencing episodic fecal incontinence.  Describes having passage of some hard, dry lumpy stools as well as pastelike stool recurrently over a 1 to 2-day period.  In between those episodes, she will typically have infrequent bowel movements, going anywhere from 2 to 5 days between BMs.  Stool tends to be hard and dry.  She has had extensive evaluation for this, including multiple colonoscopies, most recently in January 2023 that was entirely normal, but does have previous history of tubular adenoma.  She has had pelvic floor testing twice, which was apparently notable for incomplete evacuation, has been recommended to undergo pelvic floor physical therapy, which was not helpful.  She also pursued hydrotherapy, which was expensive and not very effective.  Most recently, she was recommended to try MiraLAX, use this for a period of time and this seems to exacerbate the problem.  She is not having any abdominal or rectal pain.  There has been no blood in the stool.  Has not used any stimulant laxatives or other medications for constipation before.    ROS:    10 point ROS neg other than the symptoms noted above in the HPI.    PREVIOUS ENDOSCOPY:  Reviewed, see HPI    PERTINENT RELEVANT IMAGING OR LABS:  Reviewed    ALLERGIES:     Allergies   Allergen Reactions     Codeine Unknown     Nausea     Lisinopril Unknown     Cough and lightheaded       PERTINENT MEDICATIONS:    Current Outpatient Medications:      Ascorbic Acid (VITAMIN C PO), , Disp: , Rfl:      azelastine (ASTELIN) 0.1 % nasal spray, 2 sprays each nostril daily., Disp: 30 mL, Rfl: 4     buPROPion (WELLBUTRIN XL) 150 MG 24 hr tablet, TAKE 1 TABLET BY MOUTH EVERY MORNING, Disp: 90 tablet, Rfl: 3      cholecalciferol, vitamin D3, 1,000 unit tablet, [CHOLECALCIFEROL, VITAMIN D3, 1,000 UNIT TABLET] Take 1,000 Units by mouth daily., Disp: , Rfl:      Fiber POWD, Take by mouth daily, Disp: , Rfl:      ibuprofen (ADVIL/MOTRIN) 800 MG tablet, Take 1 tablet (800 mg) by mouth every 8 hours as needed, Disp: 90 tablet, Rfl: 3     linaclotide (LINZESS) 145 MCG capsule, Take 1 capsule (145 mcg) by mouth every morning (before breakfast), Disp: 30 capsule, Rfl: 1     losartan (COZAAR) 50 MG tablet, Take 50 mg by mouth daily, Disp: , Rfl:      Multiple Vitamin (MULTIVITAMIN PO), , Disp: , Rfl:      NON FORMULARY, [NON FORMULARY] Bone up- calcium, Disp: , Rfl:      Omega-3 Fatty Acids (FISH OIL PO), Take 1 capsule by mouth daily, Disp: , Rfl:      polyethylene glycol (MIRALAX) 17 g packet, Take 1 packet by mouth daily, Disp: , Rfl:      Probiotic Product (UP4 PROBIOTICS WOMENS) CAPS, , Disp: , Rfl:      Pyridoxine HCl (B-6) 100 MG TABS, , Disp: , Rfl:      Multiple Vitamins-Minerals (HAIR SKIN AND NAILS FORMULA PO), , Disp: , Rfl:      vit A/vit C/vit E/zinc/copper (PRESERVISION AREDS ORAL), , Disp: , Rfl:     PROBLEM LIST  Patient Active Problem List    Diagnosis Date Noted     Cervical cancer screening 02/01/2023     Priority: Medium     03/28/1997 [place] listed in surgical hx but no results to review  08/11/15 NIL Pap  01/25/23 NIL Pap, Neg HR HPV Plan cotest in 3 years per provider.     Pt needs 3 consecutive negative pap tests or 2 consecutive negative cotests within 10 years with the last one being in the last 5 years, before pap screening can be discontinued.    No history of WEST 2 or greater found in epic.                   Unspecified mood (affective) disorder (H) 01/25/2023     Priority: Medium     Hyperlipidemia LDL goal <130 01/25/2023     Priority: Medium     Essential hypertension 01/25/2023     Priority: Medium     Impaired fasting glucose      Priority: Medium     Created by Conversion        Vaginal prolapse       Priority: Medium     DDD (degenerative disc disease), lumbar      Priority: Medium     History of basal cell carcinoma 10/01/2018     Priority: Medium     Adenomatous polyp of colon, unspecified part of colon-Multiple adenomatous 10/19/2017     Priority: Medium     Osteopenia      Priority: Medium     Created by Conversion  Replacement Utility updated for latest IMO load      Formatting of this note might be different from the original.  Created by Conversion    Replacement Utility updated for latest IMO load       Osteoarthrosis      Priority: Medium     Created by Conversion  Replacement Utility updated for latest IMO load      Formatting of this note might be different from the original.  Created by Conversion    Replacement Utility updated for latest IMO load       Anxiety Disorder NOS      Priority: Medium     Created by Conversion  Replacement Utility updated for latest IMO load      Formatting of this note might be different from the original.  Created by Conversion    Replacement Utility updated for latest IMO load       Insomnia      Priority: Medium     Created by Conversion      Formatting of this note might be different from the original.  Created by Conversion         PERTINENT PAST MEDICAL HISTORY:  Past Medical History:   Diagnosis Date     Full incontinence of feces     Created by Conversion      Impaired fasting glucose     Created by Conversion        PREVIOUS SURGERIES:  Past Surgical History:   Procedure Laterality Date     HAND SURGERY Left about 2010    For 1st [facility] OA     HAND SURGERY Right 02/2019    For 1st [facility] OA and carpal tunnel     NM COLPOSCOPY,CERVIX W/ADJ VAGINA,W/BX      Description: Colposcopy Cervix With Biopsy(S);  Proc Date: 03/28/1997;  Comments: mild dysplasia with koilocytosis, and endocervical polyp     ZZC LIGATE FALLOPIAN TUBE      Description: Tubal Ligation;  Recorded: 01/02/2009;       SOCIAL HISTORY:  Social History     Socioeconomic History     Marital status:       Spouse name: Not on file     Number of children: Not on file     Years of education: Not on file     Highest education level: Not on file   Occupational History     Not on file   Tobacco Use     Smoking status: Former     Types: Cigarettes     Quit date: 10/19/1987     Years since quittin.5     Smokeless tobacco: Never     Tobacco comments:     30 years ago   Vaping Use     Vaping status: Never Used   Substance and Sexual Activity     Alcohol use: Yes     Alcohol/week: 3.0 standard drinks of alcohol     Drug use: No     Sexual activity: Not on file   Other Topics Concern     Not on file   Social History Narrative        The 10-year ASCVD risk score (Nacho GREEN Jr., et al., 2013) is: 7%    Values used to calculate the score:      Age: 63 years      Sex: Female      Is Non- : No      Diabetic: No      Tobacco smoker: No      Systolic Blood Pressur e: 126 mmHg      Is BP treated: Yes      HDL Cholesterol: 68 mg/dL      Total Cholesterol: 220 mg/dL       Social Determinants of Health     Financial Resource Strain: Not on file   Food Insecurity: Not on file   Transportation Needs: Not on file   Physical Activity: Not on file   Stress: Not on file   Social Connections: Not on file   Intimate Partner Violence: Not on file   Housing Stability: Not on file       FAMILY HISTORY:  Family History   Problem Relation Age of Onset     Breast Cancer Mother 58.00     Hypothyroidism Mother      Osteoporosis Mother      Hypertension Mother      Depression Mother      Hypertension Father      Heart Disease Father      Cerebrovascular Disease Paternal Aunt      Depression Paternal Grandfather      Depression Maternal Grandfather      Anuerysm Paternal Uncle      Diabetes Paternal Grandmother      Hypertension Sister      Ovarian Cancer No family hx of        Past/family/social history reviewed and no changes    PHYSICAL EXAMINATION:  Constitutional: aaox3, cooperative, pleasant, not dyspneic/diaphoretic, no  "acute distress  Vitals reviewed: /75 (BP Location: Left arm, Patient Position: Sitting, Cuff Size: Adult Regular)   Pulse 80   Ht 1.613 m (5' 3.5\")   Wt 67.8 kg (149 lb 6.4 oz)   SpO2 98%   BMI 26.05 kg/m    Wt:   Wt Readings from Last 2 Encounters:   05/11/23 67.8 kg (149 lb 6.4 oz)   04/03/23 66.9 kg (147 lb 6.4 oz)      Eyes: Sclera anicteric/injected  CV: No edema  Respiratory: Unlabored breathing  Skin: warm, perfused, no jaundice  Psych: Normal affect  MSK: Normal gait                        Again, thank you for allowing me to participate in the care of your patient.        Sincerely,        Mack Garcia PA-C    "

## 2023-05-11 NOTE — PROGRESS NOTES
NEW PATIENT GI CLINIC VISIT    CC/REFERRING MD:  Brooke Lizarraga  REASON FOR CONSULTATION:   Brooke Lizarraga for   Chief Complaint   Patient presents with     New Patient     New consult for fecal incontinence and constipation.       ASSESSMENT/PLAN: Patient here for evaluation of chronic constipation with intermittent fecal incontinence.  We spent some time reviewing her symptoms.  She has had colonoscopy as well as pelvic floor testing on a couple of different occasions that have otherwise been normal and potentially notable for incomplete evacuation.  Based on her description of symptoms, I am highly suspicious for constipation with overflow.  She had limited effect with MiraLAX, so we will have her start on Linzess once daily.  We did review that this will result in more frequent bowel movements, the goal is to have soft but formed, easily passed stool on a consistent basis.  The hope would be is that this will prevent further episodes of overflow and will help retrain her pelvic floor and rectal vault muscles to give her a better push with each BM.  Plan for follow-up in 2 months, sooner as needed.      RTC 2 months    Thank you for this consultation.  It was a pleasure to participate in the care of this patient; please contact us with any further questions.      30 minutes spent on the date of the encounter doing chart review, patient visit and documentation    This note was created with voice recognition software, and while reviewed for accuracy, typos may remain.     Mack Garcia PA-C  Division of Gastroenterology, Hepatology and Nutrition  [facility]  Calin Aguilar is a 68 year old female with a past medical history significant for depression, anxiety, lumbar degenerative disc disease, hypertension, and hyperlipidemia that is seen as a new patient in the GI clinic today for chronic constipation and fecal incontinence.  Patient describes  roughly 12-year history of experiencing episodic fecal incontinence.  Describes having passage of some hard, dry lumpy stools as well as pastelike stool recurrently over a 1 to 2-day period.  In between those episodes, she will typically have infrequent bowel movements, going anywhere from 2 to 5 days between BMs.  Stool tends to be hard and dry.  She has had extensive evaluation for this, including multiple colonoscopies, most recently in January 2023 that was entirely normal, but does have previous history of tubular adenoma.  She has had pelvic floor testing twice, which was apparently notable for incomplete evacuation, has been recommended to undergo pelvic floor physical therapy, which was not helpful.  She also pursued hydrotherapy, which was expensive and not very effective.  Most recently, she was recommended to try MiraLAX, use this for a period of time and this seems to exacerbate the problem.  She is not having any abdominal or rectal pain.  There has been no blood in the stool.  Has not used any stimulant laxatives or other medications for constipation before.    ROS:    10 point ROS neg other than the symptoms noted above in the HPI.    PREVIOUS ENDOSCOPY:  Reviewed, see HPI    PERTINENT RELEVANT IMAGING OR LABS:  Reviewed    ALLERGIES:     Allergies   Allergen Reactions     Codeine Unknown     Nausea     Lisinopril Unknown     Cough and lightheaded       PERTINENT MEDICATIONS:    Current Outpatient Medications:      Ascorbic Acid (VITAMIN C PO), , Disp: , Rfl:      azelastine (ASTELIN) 0.1 % nasal spray, 2 sprays each nostril daily., Disp: 30 mL, Rfl: 4     buPROPion (WELLBUTRIN XL) 150 MG 24 hr tablet, TAKE 1 TABLET BY MOUTH EVERY MORNING, Disp: 90 tablet, Rfl: 3     cholecalciferol, vitamin D3, 1,000 unit tablet, [CHOLECALCIFEROL, VITAMIN D3, 1,000 UNIT TABLET] Take 1,000 Units by mouth daily., Disp: , Rfl:      Fiber POWD, Take by mouth daily, Disp: , Rfl:      ibuprofen (ADVIL/MOTRIN) 800 MG  tablet, Take 1 tablet (800 mg) by mouth every 8 hours as needed, Disp: 90 tablet, Rfl: 3     linaclotide (LINZESS) 145 MCG capsule, Take 1 capsule (145 mcg) by mouth every morning (before breakfast), Disp: 30 capsule, Rfl: 1     losartan (COZAAR) 50 MG tablet, Take 50 mg by mouth daily, Disp: , Rfl:      Multiple Vitamin (MULTIVITAMIN PO), , Disp: , Rfl:      NON FORMULARY, [NON FORMULARY] Bone up- calcium, Disp: , Rfl:      Omega-3 Fatty Acids (FISH OIL PO), Take 1 capsule by mouth daily, Disp: , Rfl:      polyethylene glycol (MIRALAX) 17 g packet, Take 1 packet by mouth daily, Disp: , Rfl:      Probiotic Product (UP4 PROBIOTICS WOMENS) CAPS, , Disp: , Rfl:      Pyridoxine HCl (B-6) 100 MG TABS, , Disp: , Rfl:      Multiple Vitamins-Minerals (HAIR SKIN AND NAILS FORMULA PO), , Disp: , Rfl:      vit A/vit C/vit E/zinc/copper (PRESERVISION AREDS ORAL), , Disp: , Rfl:     PROBLEM LIST  Patient Active Problem List    Diagnosis Date Noted     Cervical cancer screening 02/01/2023     Priority: Medium     03/28/1997 [place] listed in surgical hx but no results to review  08/11/15 NIL Pap  01/25/23 NIL Pap, Neg HR HPV Plan cotest in 3 years per provider.     Pt needs 3 consecutive negative pap tests or 2 consecutive negative cotests within 10 years with the last one being in the last 5 years, before pap screening can be discontinued.    No history of WEST 2 or greater found in epic.                   Unspecified mood (affective) disorder (H) 01/25/2023     Priority: Medium     Hyperlipidemia LDL goal <130 01/25/2023     Priority: Medium     Essential hypertension 01/25/2023     Priority: Medium     Impaired fasting glucose      Priority: Medium     Created by Conversion        Vaginal prolapse      Priority: Medium     DDD (degenerative disc disease), lumbar      Priority: Medium     History of basal cell carcinoma 10/01/2018     Priority: Medium     Adenomatous polyp of colon, unspecified part of colon-Multiple adenomatous  10/19/2017     Priority: Medium     Osteopenia      Priority: Medium     Created by Conversion  Replacement Utility updated for latest IMO load      Formatting of this note might be different from the original.  Created by Conversion    Replacement Utility updated for latest IMO load       Osteoarthrosis      Priority: Medium     Created by Conversion  Replacement Utility updated for latest IMO load      Formatting of this note might be different from the original.  Created by Conversion    Replacement Utility updated for latest IMO load       Anxiety Disorder NOS      Priority: Medium     Created by Conversion  Replacement Utility updated for latest IMO load      Formatting of this note might be different from the original.  Created by Conversion    Replacement Utility updated for latest IMO load       Insomnia      Priority: Medium     Created by Conversion      Formatting of this note might be different from the original.  Created by Conversion         PERTINENT PAST MEDICAL HISTORY:  Past Medical History:   Diagnosis Date     Full incontinence of feces     Created by Conversion      Impaired fasting glucose     Created by Conversion        PREVIOUS SURGERIES:  Past Surgical History:   Procedure Laterality Date     HAND SURGERY Left about 2010    For 1st [facility] OA     HAND SURGERY Right 02/2019    For 1st [facility] OA and carpal tunnel     SC COLPOSCOPY,CERVIX W/ADJ VAGINA,W/BX      Description: Colposcopy Cervix With Biopsy(S);  Proc Date: 03/28/1997;  Comments: mild dysplasia with koilocytosis, and endocervical polyp     ZC LIGATE FALLOPIAN TUBE      Description: Tubal Ligation;  Recorded: 01/02/2009;       SOCIAL HISTORY:  Social History     Socioeconomic History     Marital status:      Spouse name: Not on file     Number of children: Not on file     Years of education: Not on file     Highest education level: Not on file   Occupational History     Not on file   Tobacco Use     Smoking status: Former      "Types: Cigarettes     Quit date: 10/19/1987     Years since quittin.5     Smokeless tobacco: Never     Tobacco comments:     30 years ago   Vaping Use     Vaping status: Never Used   Substance and Sexual Activity     Alcohol use: Yes     Alcohol/week: 3.0 standard drinks of alcohol     Drug use: No     Sexual activity: Not on file   Other Topics Concern     Not on file   Social History Narrative        The 10-year ASCVD risk score (Nacho GREEN Jr., et al., 2013) is: 7%    Values used to calculate the score:      Age: 63 years      Sex: Female      Is Non- : No      Diabetic: No      Tobacco smoker: No      Systolic Blood Pressur e: 126 mmHg      Is BP treated: Yes      HDL Cholesterol: 68 mg/dL      Total Cholesterol: 220 mg/dL       Social Determinants of Health     Financial Resource Strain: Not on file   Food Insecurity: Not on file   Transportation Needs: Not on file   Physical Activity: Not on file   Stress: Not on file   Social Connections: Not on file   Intimate Partner Violence: Not on file   Housing Stability: Not on file       FAMILY HISTORY:  Family History   Problem Relation Age of Onset     Breast Cancer Mother 58.00     Hypothyroidism Mother      Osteoporosis Mother      Hypertension Mother      Depression Mother      Hypertension Father      Heart Disease Father      Cerebrovascular Disease Paternal Aunt      Depression Paternal Grandfather      Depression Maternal Grandfather      Anuerysm Paternal Uncle      Diabetes Paternal Grandmother      Hypertension Sister      Ovarian Cancer No family hx of        Past/family/social history reviewed and no changes    PHYSICAL EXAMINATION:  Constitutional: aaox3, cooperative, pleasant, not dyspneic/diaphoretic, no acute distress  Vitals reviewed: /75 (BP Location: Left arm, Patient Position: Sitting, Cuff Size: Adult Regular)   Pulse 80   Ht 1.613 m (5' 3.5\")   Wt 67.8 kg (149 lb 6.4 oz)   SpO2 98%   BMI 26.05 kg/m    Wt: "   Wt Readings from Last 2 Encounters:   05/11/23 67.8 kg (149 lb 6.4 oz)   04/03/23 66.9 kg (147 lb 6.4 oz)      Eyes: Sclera anicteric/injected  CV: No edema  Respiratory: Unlabored breathing  Skin: warm, perfused, no jaundice  Psych: Normal affect  MSK: Normal gait

## 2023-05-11 NOTE — NURSING NOTE
"Chief Complaint   Patient presents with     New Patient     New consult for fecal incontinence and constipation.     She requests these members of her care team be copied on today's visit information:  PCP: Amarilis Delgado    Referring Provider:  Brooke Lizarraga PA-C  [address] [place] [address]    Vitals:    05/11/23 1051   BP: 107/75   BP Location: Left arm   Patient Position: Sitting   Cuff Size: Adult Regular   Pulse: 80   SpO2: 98%   Weight: 67.8 kg (149 lb 6.4 oz)   Height: 1.613 m (5' 3.5\")     Body mass index is 26.05 kg/m .    Medications were reconciled.        Smita Ogden CMA    "

## 2023-06-01 DIAGNOSIS — M19.90 OSTEOARTHRITIS, UNSPECIFIED OSTEOARTHRITIS TYPE, UNSPECIFIED SITE: ICD-10-CM

## 2023-06-02 RX ORDER — IBUPROFEN 800 MG/1
800 TABLET, FILM COATED ORAL EVERY 8 HOURS PRN
Qty: 90 TABLET | Refills: 3 | Status: SHIPPED | OUTPATIENT
Start: 2023-06-02 | End: 2024-08-01

## 2023-06-02 NOTE — TELEPHONE ENCOUNTER
"Routing refill request to provider for review/approval because:  Pt is over 61 yo  A break in medication    Last Written Prescription Date:  1/24/2022  Last Fill Quantity: 90,  # refills: 3   Last office visit provider:  4/3/2023     Requested Prescriptions   Pending Prescriptions Disp Refills     ibuprofen (ADVIL/MOTRIN) 800 MG tablet [Pharmacy Med Name: IBUPROFEN 800 MG TABLET] 90 tablet 3     Sig: TAKE 1 TABLET (800 MG) BY MOUTH EVERY 8 HOURS AS NEEDED       NSAID Medications Failed - 6/1/2023  7:41 AM        Failed - Patient is age 6-64 years        Passed - Blood pressure under 140/90 in past 12 months     BP Readings from Last 3 Encounters:   05/11/23 107/75   04/03/23 131/86   02/16/23 120/79                 Passed - Normal ALT on file in past 12 months     Recent Labs   Lab Test 01/25/23  1107   ALT 26             Passed - Normal AST on file in past 12 months     Recent Labs   Lab Test 01/25/23  1107   AST 29             Passed - Recent (12 mo) or future (30 days) visit within the authorizing provider's specialty     Patient has had an office visit with the authorizing provider or a provider within the authorizing providers department within the previous 12 mos or has a future within next 30 days. See \"Patient Info\" tab in inbasket, or \"Choose Columns\" in Meds & Orders section of the refill encounter.              Passed - Normal CBC on file in past 12 months     Recent Labs   Lab Test 01/25/23  1107   WBC 5.2   RBC 4.40   HGB 13.5   HCT 40.5                    Passed - Medication is active on med list        Passed - No active pregnancy on record        Passed - Normal serum creatinine on file in past 12 months     Recent Labs   Lab Test 01/25/23  1107   CR 0.78       Ok to refill medication if creatinine is low          Passed - No positive pregnancy test in past 12 months             Helene Street RN 06/02/23 3:37 AM  "

## 2023-06-09 ENCOUNTER — TRANSFERRED RECORDS (OUTPATIENT)
Dept: HEALTH INFORMATION MANAGEMENT | Facility: CLINIC | Age: 68
End: 2023-06-09
Payer: COMMERCIAL

## 2023-06-26 ENCOUNTER — TRANSFERRED RECORDS (OUTPATIENT)
Dept: HEALTH INFORMATION MANAGEMENT | Facility: CLINIC | Age: 68
End: 2023-06-26
Payer: COMMERCIAL

## 2023-07-13 DIAGNOSIS — I10 ESSENTIAL HYPERTENSION: ICD-10-CM

## 2023-07-13 DIAGNOSIS — R45.86 MOOD CHANGE: Primary | ICD-10-CM

## 2023-07-13 RX ORDER — LOSARTAN POTASSIUM 50 MG/1
50 TABLET ORAL DAILY
Qty: 90 TABLET | Refills: 1 | Status: SHIPPED | OUTPATIENT
Start: 2023-07-13 | End: 2024-01-09

## 2023-08-07 ENCOUNTER — VIRTUAL VISIT (OUTPATIENT)
Dept: GASTROENTEROLOGY | Facility: CLINIC | Age: 68
End: 2023-08-07
Attending: PHYSICIAN ASSISTANT
Payer: COMMERCIAL

## 2023-08-07 ENCOUNTER — DOCUMENTATION ONLY (OUTPATIENT)
Dept: GASTROENTEROLOGY | Facility: CLINIC | Age: 68
End: 2023-08-07

## 2023-08-07 VITALS — BODY MASS INDEX: 25.63 KG/M2 | WEIGHT: 147 LBS

## 2023-08-07 DIAGNOSIS — K59.09 CHRONIC CONSTIPATION WITH OVERFLOW: Primary | ICD-10-CM

## 2023-08-07 PROCEDURE — 99214 OFFICE O/P EST MOD 30 MIN: CPT | Mod: 95 | Performed by: PHYSICIAN ASSISTANT

## 2023-08-07 ASSESSMENT — PAIN SCALES - GENERAL: PAINLEVEL: NO PAIN (0)

## 2023-08-07 NOTE — NURSING NOTE
Is the patient currently in the state of [place]? YES    Visit mode:VIDEO    If the visit is dropped, the patient can be reconnected by: VIDEO VISIT: Text to cell phone: [phone]    Will anyone else be joining the visit? NO      How would you like to obtain your AVS? MyChart    Are changes needed to the allergy or medication list? NO    Reason for visit: Video Visit (Recheck)

## 2023-08-07 NOTE — PROGRESS NOTES
GASTROENTEROLOGY Follow-up VIDEO VISIT    CC/REFERRING MD:    Amarilis Delgado    REASON FOR CONSULTATION:   Mack Garcia for   Chief Complaint   Patient presents with    Video Visit     Recheck       HISTORY OF PRESENT ILLNESS:    Calin Aguilar is a 68 year old female who is being evaluated via a billable video visit for follow up.  To review, I initially met with patient about 3 months ago.  At the time, she described having longstanding history of constipation, describing having hard, dry stool intermittently and then having several days between bowel movements.  There had also been associated fecal incontinence after bowel movements as well.  She had previously undergone pelvic floor testing and had participated in biofeedback therapy with limited relief.  I had recommended trying to improve stool output, with a working diagnosis of constipation with overflow resulting in incontinence.  We started her on Linzess 145 mcg daily.  This did improve her stool output and eliminated the hard, dry stool.  At times this seems to border on diarrhea, so she started taking it every other day.  Unfortunately, there really has not been any improvement in the episodes of fecal incontinence.      I have reviewed and updated the patient's Past Medical History, Social History, Family History and Medication List.    Exam:    General appearance:  Healthy appearing adult, in no acute distress  Eyes:  Sclera anicteric, Pupils round and reactive to light  Ears, nose, mouth and throat:  No obvious external lesions of ears and nose.  Hearing intact  Neck:  Symmetric, No obvious external lesions  Respiratory:  Normal respiration, no use of accessory muscles   MSK:  No visual upper extremity, neck or facial muscle atrophy  ABD:  No visual abdominal distention, no audible borborygmi  Skin:  No rashes or jaundice   Psychiatric:  Oriented to person, place and time, Appropriate mood and affect.   Neurologic:  Peripheral  muscle function and dexterity appear to be intact      PERTINENT STUDIES have been reviewed.    ASSESSMENT/PLAN:    Calin Aguilar is a 68 year old female who presents for follow up of longstanding constipation with associated fecal incontinence.  Use of Linzess has improved her stool output and she is now passing softer, more frequent BMs, but has not seen any improvement with her incontinence.  We discussed potential next steps.  We are going to lower the dose of the Linzess to 72 mcg daily and I recommended that she continue taking this to maintain her current level of stool output.  Over time, this may ultimately improve the episodes of incontinence.  I do think it is worth her time to revisit colorectal surgery to discuss more aggressive therapies, such as sacral lower stimulation or sphincteroplasty.  We will place referral back to the pelvic floor center for consultation.  She will follow-up with me in 2 months regarding the Linzess, sooner as needed.  1. Chronic constipation with overflow  - linaclotide (LINZESS) 72 MCG capsule; Take 1 capsule (72 mcg) by mouth every morning (before breakfast)  Dispense: 60 capsule; Refill: 1      Video-Visit Details    Video Visit Time: 15 minutes    Type of service:  Video Visit    Originating Location (pt. Location): Home    Distant Location (provider location):  Off-site    Platform used for Video Visit: Mani    A total of 26 minutes was spent with reviewing the chart, discussing with the patient, documentation and coordination of care.    Mack Garcia PA-C  Division of Gastroenterology, Hepatology, and Nutrition  [facility]  [phone]    RTC 2 months

## 2023-08-07 NOTE — PROGRESS NOTES
Per Mack Garcia PA-C's request, [facility] faxed referral to [facility] on 08/07/2023. Fax sent/received confirmation received through RightFax.    Smita Ogden CMA

## 2023-08-09 ENCOUNTER — TELEPHONE (OUTPATIENT)
Dept: GASTROENTEROLOGY | Facility: CLINIC | Age: 68
End: 2023-08-09
Payer: COMMERCIAL

## 2023-08-09 NOTE — TELEPHONE ENCOUNTER
[facility] Call Center    Phone Message    May a detailed message be left on voicemail: yes     Reason for Call: Other: Mack at [facility] called.  Please fax over patient's demographics to: [phone].  Any questions, please contact Mack at [phone].     Action Taken: Message routed to:  [facility]    Travel Screening: Not Applicable

## 2023-08-10 NOTE — TELEPHONE ENCOUNTER
CMA re-faxed order to the [facility] with patient's demographics included.  Closing encounter.    Smita Ogden CMA

## 2023-09-26 ENCOUNTER — ALLIED HEALTH/NURSE VISIT (OUTPATIENT)
Dept: FAMILY MEDICINE | Facility: CLINIC | Age: 68
End: 2023-09-26
Payer: COMMERCIAL

## 2023-09-26 ENCOUNTER — E-VISIT (OUTPATIENT)
Dept: FAMILY MEDICINE | Facility: CLINIC | Age: 68
End: 2023-09-26

## 2023-09-26 VITALS — DIASTOLIC BLOOD PRESSURE: 74 MMHG | SYSTOLIC BLOOD PRESSURE: 134 MMHG | HEART RATE: 72 BPM

## 2023-09-26 DIAGNOSIS — Z23 NEED FOR PROPHYLACTIC VACCINATION AND INOCULATION AGAINST INFLUENZA: Primary | ICD-10-CM

## 2023-09-26 DIAGNOSIS — R45.86 MOOD CHANGE: Primary | ICD-10-CM

## 2023-09-26 PROCEDURE — 90662 IIV NO PRSV INCREASED AG IM: CPT

## 2023-09-26 PROCEDURE — 99422 OL DIG E/M SVC 11-20 MIN: CPT | Performed by: FAMILY MEDICINE

## 2023-09-26 PROCEDURE — 99207 PR NO CHARGE NURSE ONLY: CPT

## 2023-09-26 PROCEDURE — G0008 ADMIN INFLUENZA VIRUS VAC: HCPCS

## 2023-09-26 ASSESSMENT — PATIENT HEALTH QUESTIONNAIRE - PHQ9
SUM OF ALL RESPONSES TO PHQ QUESTIONS 1-9: 4
SUM OF ALL RESPONSES TO PHQ QUESTIONS 1-9: 4
10. IF YOU CHECKED OFF ANY PROBLEMS, HOW DIFFICULT HAVE THESE PROBLEMS MADE IT FOR YOU TO DO YOUR WORK, TAKE CARE OF THINGS AT HOME, OR GET ALONG WITH OTHER PEOPLE: SOMEWHAT DIFFICULT

## 2023-09-26 NOTE — PROGRESS NOTES
Follow Up Blood Pressure Check    Calin Aguilar is a 68 year old female recommended to follow up for blood pressure check by Amarilis Delgadoihypertensive medications and adherence were verified: Yes.     Reason for visit: BP check    Medication change at last visit:  No     Today's Vitals:   Vitals:    09/26/23 1307   BP: 134/74   BP Location: Right arm   Patient Position: Sitting   Cuff Size: Adult Regular   Pulse: 72       Home blood pressure readings brought in today:   No    Lowest blood pressure today is less than 140/90 and they deny signs or symptoms of new onset: severe headache, fatigue, confusion, vision changes, chest pain, pounding in the chest, neck, ears, irregular heartbeat, difficulty breathing, and blood in the urine.  Please inform patient of his/her blood pressure today.  If they are asymptomatic, the patient is to continue current medications.  This message will be routed to their provider, and they will be notified if a change in medication is recommended.      Laura Moody MA    Current Outpatient Medications   Medication Sig Dispense Refill    Ascorbic Acid (VITAMIN C PO)       azelastine (ASTELIN) 0.1 % nasal spray 2 sprays each nostril daily. 30 mL 4    buPROPion (WELLBUTRIN XL) 150 MG 24 hr tablet TAKE 1 TABLET BY MOUTH EVERY MORNING 90 tablet 3    cholecalciferol, vitamin D3, 1,000 unit tablet [CHOLECALCIFEROL, VITAMIN D3, 1,000 UNIT TABLET] Take 1,000 Units by mouth daily.      Fiber POWD Take by mouth daily      ibuprofen (ADVIL/MOTRIN) 800 MG tablet TAKE 1 TABLET (800 MG) BY MOUTH EVERY 8 HOURS AS NEEDED 90 tablet 3    linaclotide (LINZESS) 72 MCG capsule Take 1 capsule (72 mcg) by mouth every morning (before breakfast) 60 capsule 1    losartan (COZAAR) 50 MG tablet Take 1 tablet (50 mg) by mouth daily 90 tablet 1    Multiple Vitamin (MULTIVITAMIN PO)       Multiple Vitamins-Minerals (HAIR SKIN AND NAILS FORMULA PO)  (Patient not taking: Reported on 5/11/2023)      NON FORMULARY  [NON FORMULARY] Bone up- calcium      Omega-3 Fatty Acids (FISH OIL PO) Take 1 capsule by mouth daily      polyethylene glycol (MIRALAX) 17 g packet Take 1 packet by mouth daily      Probiotic Product (UP4 PROBIOTICS WOMENS) CAPS       Pyridoxine HCl (B-6) 100 MG TABS       vit A/vit C/vit E/zinc/copper (PRESERVISION AREDS ORAL)  (Patient not taking: Reported on 5/11/2023)

## 2023-09-27 ENCOUNTER — TELEPHONE (OUTPATIENT)
Dept: FAMILY MEDICINE | Facility: CLINIC | Age: 68
End: 2023-09-27
Payer: COMMERCIAL

## 2023-09-27 RX ORDER — BUPROPION HYDROCHLORIDE 300 MG/1
300 TABLET ORAL EVERY MORNING
Qty: 90 TABLET | Refills: 3 | Status: SHIPPED | OUTPATIENT
Start: 2023-09-27

## 2023-09-27 ASSESSMENT — PATIENT HEALTH QUESTIONNAIRE - PHQ9: SUM OF ALL RESPONSES TO PHQ QUESTIONS 1-9: 4

## 2023-09-27 NOTE — TELEPHONE ENCOUNTER
Called and spoke to patient. Informed patient of note from provider.     Also read pt instructions from E visit about in creasing bupropion.           BP looks good, continue same meds.

## 2023-12-04 ENCOUNTER — PATIENT OUTREACH (OUTPATIENT)
Dept: GASTROENTEROLOGY | Facility: CLINIC | Age: 68
End: 2023-12-04
Payer: COMMERCIAL

## 2024-01-09 DIAGNOSIS — I10 ESSENTIAL HYPERTENSION: ICD-10-CM

## 2024-01-09 RX ORDER — LOSARTAN POTASSIUM 50 MG/1
50 TABLET ORAL DAILY
Qty: 90 TABLET | Refills: 0 | Status: SHIPPED | OUTPATIENT
Start: 2024-01-09 | End: 2024-03-23

## 2024-01-18 ENCOUNTER — TRANSFERRED RECORDS (OUTPATIENT)
Dept: HEALTH INFORMATION MANAGEMENT | Facility: CLINIC | Age: 69
End: 2024-01-18
Payer: COMMERCIAL

## 2024-01-18 ENCOUNTER — PATIENT OUTREACH (OUTPATIENT)
Dept: CARE COORDINATION | Facility: CLINIC | Age: 69
End: 2024-01-18
Payer: COMMERCIAL

## 2024-01-22 ASSESSMENT — ENCOUNTER SYMPTOMS
ABDOMINAL PAIN: 0
MYALGIAS: 0
HEARTBURN: 0
NAUSEA: 0
WEAKNESS: 0
PALPITATIONS: 0
ARTHRALGIAS: 0
PARESTHESIAS: 0
SORE THROAT: 0
FEVER: 0
FREQUENCY: 0
DIZZINESS: 0
EYE PAIN: 0
COUGH: 0
HEADACHES: 0
DYSURIA: 0
HEMATOCHEZIA: 0
BREAST MASS: 0
HEMATURIA: 0
JOINT SWELLING: 1
CHILLS: 0
DIARRHEA: 0
CONSTIPATION: 1
SHORTNESS OF BREATH: 0
NERVOUS/ANXIOUS: 0

## 2024-01-22 ASSESSMENT — ACTIVITIES OF DAILY LIVING (ADL): CURRENT_FUNCTION: NO ASSISTANCE NEEDED

## 2024-01-22 NOTE — COMMUNITY RESOURCES LIST (ENGLISH)
01/22/2024   [facility] [employer]  N/A  For questions about this resource list or additional care needs, please contact your primary care clinic or care manager.  Phone: [phone]   Email: N/A   Address: [address] [place] [id number]   Hours: N/A        Financial Stability       Utility payment assistance  1  [facility] Distance: 6.33 miles      In-Person, Phone/Virtual   [address] [place] [address]  Language: English  Hours: Mon - Fri 8:00 AM - 4:30 PM  Fees: Free   Phone: (865) 181-1286 Email: [email or web address] Website: [email or web address]     2  [employer] Distance: 8.82 miles      In-Person, Phone/Virtual   [address] [place] [address]  Language: English  Hours: Mon - Sun Appt. Only  Fees: Free   Phone: (857) 475-5301 Email: maida@[employer] Website: http://www.[employer].org          Important Numbers & Websites       Emergency Services   911  City Services   311  Poison Control   (773) 506-4404  Suicide Prevention Lifeline   (675) 780-8483 (TALK)  Child Abuse Hotline   (247) 993-2857 (4-A-Child)  Sexual Assault Hotline   (120) 604-3180 (HOPE)  National Runaway Safeline   (925) 655-1868 (RUNAWAY)  All-Options Talkline   (431) 245-8702  Substance Abuse Referral   (719) 773-1744 (HELP)

## 2024-01-29 ENCOUNTER — OFFICE VISIT (OUTPATIENT)
Dept: FAMILY MEDICINE | Facility: CLINIC | Age: 69
End: 2024-01-29
Payer: COMMERCIAL

## 2024-01-29 VITALS
SYSTOLIC BLOOD PRESSURE: 128 MMHG | HEART RATE: 60 BPM | BODY MASS INDEX: 25.3 KG/M2 | RESPIRATION RATE: 16 BRPM | OXYGEN SATURATION: 93 % | HEIGHT: 64 IN | DIASTOLIC BLOOD PRESSURE: 80 MMHG | WEIGHT: 148.2 LBS | TEMPERATURE: 97.7 F

## 2024-01-29 DIAGNOSIS — D12.6 ADENOMATOUS POLYP OF COLON, UNSPECIFIED PART OF COLON: ICD-10-CM

## 2024-01-29 DIAGNOSIS — M94.9 DISORDER OF BONE AND CARTILAGE: ICD-10-CM

## 2024-01-29 DIAGNOSIS — Z00.00 ENCOUNTER FOR MEDICARE ANNUAL WELLNESS EXAM: Primary | ICD-10-CM

## 2024-01-29 DIAGNOSIS — N39.46 MIXED INCONTINENCE: ICD-10-CM

## 2024-01-29 DIAGNOSIS — I10 ESSENTIAL HYPERTENSION: ICD-10-CM

## 2024-01-29 DIAGNOSIS — F39 UNSPECIFIED MOOD (AFFECTIVE) DISORDER (H): ICD-10-CM

## 2024-01-29 DIAGNOSIS — E55.9 VITAMIN D DEFICIENCY: ICD-10-CM

## 2024-01-29 DIAGNOSIS — Z78.0 MENOPAUSE: ICD-10-CM

## 2024-01-29 DIAGNOSIS — E78.5 HYPERLIPIDEMIA LDL GOAL <130: ICD-10-CM

## 2024-01-29 DIAGNOSIS — R93.89 ABNORMAL FINDINGS ON DIAGNOSTIC IMAGING OF OTHER SPECIFIED BODY STRUCTURES: ICD-10-CM

## 2024-01-29 DIAGNOSIS — Z85.828 HISTORY OF BASAL CELL CARCINOMA: ICD-10-CM

## 2024-01-29 DIAGNOSIS — R73.01 IMPAIRED FASTING GLUCOSE: ICD-10-CM

## 2024-01-29 DIAGNOSIS — M89.9 DISORDER OF BONE AND CARTILAGE: ICD-10-CM

## 2024-01-29 LAB
ALBUMIN SERPL BCG-MCNC: 4.7 G/DL (ref 3.5–5.2)
ALP SERPL-CCNC: 68 U/L (ref 40–150)
ALT SERPL W P-5'-P-CCNC: 20 U/L (ref 0–50)
ANION GAP SERPL CALCULATED.3IONS-SCNC: 11 MMOL/L (ref 7–15)
AST SERPL W P-5'-P-CCNC: 22 U/L (ref 0–45)
BILIRUB SERPL-MCNC: 0.4 MG/DL
BUN SERPL-MCNC: 25.4 MG/DL (ref 8–23)
CALCIUM SERPL-MCNC: 9.8 MG/DL (ref 8.8–10.2)
CHLORIDE SERPL-SCNC: 104 MMOL/L (ref 98–107)
CHOLEST SERPL-MCNC: 241 MG/DL
CREAT SERPL-MCNC: 0.91 MG/DL (ref 0.51–0.95)
DEPRECATED HCO3 PLAS-SCNC: 25 MMOL/L (ref 22–29)
EGFRCR SERPLBLD CKD-EPI 2021: 68 ML/MIN/1.73M2
ERYTHROCYTE [DISTWIDTH] IN BLOOD BY AUTOMATED COUNT: 12.2 % (ref 10–15)
FASTING STATUS PATIENT QL REPORTED: YES
GLUCOSE SERPL-MCNC: 93 MG/DL (ref 70–99)
HBA1C MFR BLD: 5.7 % (ref 0–5.6)
HCT VFR BLD AUTO: 43.8 % (ref 35–47)
HDLC SERPL-MCNC: 59 MG/DL
HGB BLD-MCNC: 14.6 G/DL (ref 11.7–15.7)
LDLC SERPL CALC-MCNC: 154 MG/DL
MCH RBC QN AUTO: 30.5 PG (ref 26.5–33)
MCHC RBC AUTO-ENTMCNC: 33.3 G/DL (ref 31.5–36.5)
MCV RBC AUTO: 91 FL (ref 78–100)
NONHDLC SERPL-MCNC: 182 MG/DL
PLATELET # BLD AUTO: 248 10E3/UL (ref 150–450)
POTASSIUM SERPL-SCNC: 4.6 MMOL/L (ref 3.4–5.3)
PROT SERPL-MCNC: 7.2 G/DL (ref 6.4–8.3)
RBC # BLD AUTO: 4.79 10E6/UL (ref 3.8–5.2)
SODIUM SERPL-SCNC: 140 MMOL/L (ref 135–145)
TRIGL SERPL-MCNC: 141 MG/DL
TSH SERPL DL<=0.005 MIU/L-ACNC: 2.78 UIU/ML (ref 0.3–4.2)
VIT D+METAB SERPL-MCNC: 51 NG/ML (ref 20–50)
WBC # BLD AUTO: 5.8 10E3/UL (ref 4–11)

## 2024-01-29 PROCEDURE — G0439 PPPS, SUBSEQ VISIT: HCPCS | Performed by: FAMILY MEDICINE

## 2024-01-29 PROCEDURE — 99213 OFFICE O/P EST LOW 20 MIN: CPT | Mod: 25 | Performed by: FAMILY MEDICINE

## 2024-01-29 PROCEDURE — 36415 COLL VENOUS BLD VENIPUNCTURE: CPT | Performed by: FAMILY MEDICINE

## 2024-01-29 PROCEDURE — 84443 ASSAY THYROID STIM HORMONE: CPT | Performed by: FAMILY MEDICINE

## 2024-01-29 PROCEDURE — 82306 VITAMIN D 25 HYDROXY: CPT | Performed by: FAMILY MEDICINE

## 2024-01-29 PROCEDURE — 80061 LIPID PANEL: CPT | Performed by: FAMILY MEDICINE

## 2024-01-29 PROCEDURE — 83036 HEMOGLOBIN GLYCOSYLATED A1C: CPT | Performed by: FAMILY MEDICINE

## 2024-01-29 PROCEDURE — 85027 COMPLETE CBC AUTOMATED: CPT | Performed by: FAMILY MEDICINE

## 2024-01-29 PROCEDURE — 80053 COMPREHEN METABOLIC PANEL: CPT | Performed by: FAMILY MEDICINE

## 2024-01-29 ASSESSMENT — ENCOUNTER SYMPTOMS
EYE PAIN: 0
MYALGIAS: 0
DIARRHEA: 0
SHORTNESS OF BREATH: 0
CHILLS: 0
SORE THROAT: 0
HEMATURIA: 0
FEVER: 0
ABDOMINAL PAIN: 0
CONSTIPATION: 1
HEADACHES: 0
COUGH: 0
NERVOUS/ANXIOUS: 0
NAUSEA: 0
ARTHRALGIAS: 0
WEAKNESS: 0
JOINT SWELLING: 1
DYSURIA: 0
FREQUENCY: 0
PALPITATIONS: 0
DIZZINESS: 0

## 2024-01-29 ASSESSMENT — ANXIETY QUESTIONNAIRES
GAD7 TOTAL SCORE: 1
8. IF YOU CHECKED OFF ANY PROBLEMS, HOW DIFFICULT HAVE THESE MADE IT FOR YOU TO DO YOUR WORK, TAKE CARE OF THINGS AT HOME, OR GET ALONG WITH OTHER PEOPLE?: NOT DIFFICULT AT ALL
GAD7 TOTAL SCORE: 1
7. FEELING AFRAID AS IF SOMETHING AWFUL MIGHT HAPPEN: NOT AT ALL
IF YOU CHECKED OFF ANY PROBLEMS ON THIS QUESTIONNAIRE, HOW DIFFICULT HAVE THESE PROBLEMS MADE IT FOR YOU TO DO YOUR WORK, TAKE CARE OF THINGS AT HOME, OR GET ALONG WITH OTHER PEOPLE: NOT DIFFICULT AT ALL
6. BECOMING EASILY ANNOYED OR IRRITABLE: SEVERAL DAYS
3. WORRYING TOO MUCH ABOUT DIFFERENT THINGS: NOT AT ALL
2. NOT BEING ABLE TO STOP OR CONTROL WORRYING: NOT AT ALL
1. FEELING NERVOUS, ANXIOUS, OR ON EDGE: NOT AT ALL
5. BEING SO RESTLESS THAT IT IS HARD TO SIT STILL: NOT AT ALL
7. FEELING AFRAID AS IF SOMETHING AWFUL MIGHT HAPPEN: NOT AT ALL
4. TROUBLE RELAXING: NOT AT ALL
GAD7 TOTAL SCORE: 1

## 2024-01-29 ASSESSMENT — PATIENT HEALTH QUESTIONNAIRE - PHQ9
SUM OF ALL RESPONSES TO PHQ QUESTIONS 1-9: 2
SUM OF ALL RESPONSES TO PHQ QUESTIONS 1-9: 2
10. IF YOU CHECKED OFF ANY PROBLEMS, HOW DIFFICULT HAVE THESE PROBLEMS MADE IT FOR YOU TO DO YOUR WORK, TAKE CARE OF THINGS AT HOME, OR GET ALONG WITH OTHER PEOPLE: NOT DIFFICULT AT ALL

## 2024-01-29 ASSESSMENT — ACTIVITIES OF DAILY LIVING (ADL): CURRENT_FUNCTION: NO ASSISTANCE NEEDED

## 2024-01-29 NOTE — PATIENT INSTRUCTIONS
With your plans to travel to [place] and [place] in September I would recommend a COVID shot before then and there may be an new 1 in the fall that is unknown at this time.  Also please check the CDC website to see if any other immunizations are needed like typhoid malaria etc. but I doubt those are needed.  You are up-to-date on your hepatitis A, hepatitis B and tetanus and flu.    Following with ortho for left Achilles tendinitis, please follow-up with them for further steps, like physical therapy.    Placing a referral to [facility] for the mixed incontinence.    I am glad you see Dermatology for history of skin cancer for full body skin check.  Please also ask her for about the purple lesions on your arms.    Let us know if you or family have any memory concerns.      Patient Education   Personalized Prevention Plan  You are due for the preventive services outlined below.  Your care team is available to assist you in scheduling these services.  If you have already completed any of these items, please share that information with your care team to update in your medical record.      Health Maintenance   Topic Date Due    RSV VACCINE (Pregnancy & 60+) (1 - 1-dose 60+ series) Completed    ANNUAL REVIEW OF HM ORDERS  Today    COVID-19 Vaccine (5 - 2023-24 season) Declined    MEDICARE ANNUAL WELLNESS VISIT  Today    MAMMO SCREENING  Scheduled    DEXA  06/21/2024, ordered    FALL RISK ASSESSMENT  Today    PAP FOLLOW-UP  01/25/2026    HPV FOLLOW-UP  01/25/2026    COLORECTAL CANCER SCREENING  01/19/2028    LIPID  Today    ADVANCE CARE PLANNING  We would like a copy please    DTAP/TDAP/TD IMMUNIZATION (4 - Td or Tdap) 10/10/2029    PHQ-2 (once per calendar year)  Completed    INFLUENZA VACCINE  Completed    Pneumococcal Vaccine: 65+ Years  Completed    ZOSTER IMMUNIZATION  Completed    IPV IMMUNIZATION  Aged Out    HPV IMMUNIZATION  Aged Out    MENINGITIS IMMUNIZATION  Aged Out    RSV MONOCLONAL ANTIBODY  NA     HEPATITIS C SCREENING  Discontinued       Bladder Training: Care Instructions  Your Care Instructions     Bladder training is used to treat urge incontinence and stress incontinence. Urge incontinence means that the need to urinate comes on so fast that you can't get to a toilet in time. Stress incontinence means that you leak urine because of pressure on your bladder. For example, it may happen when you laugh, cough, or lift something heavy.  Bladder training can increase how long you can wait before you have to urinate. It can also help your bladder hold more urine. And it can give you better control over the urge to urinate.  It is important to remember that bladder training takes a few weeks to a few months to make a difference. You may not see results right away, but don't give up.  Follow-up care is a key part of your treatment and safety. Be sure to make and go to all appointments, and call your doctor if you are having problems. It's also a good idea to know your test results and keep a list of the medicines you take.  How can you care for yourself at home?  Work with your doctor to come up with a bladder training program that is right for you. You may use one or more of the following methods.  Delayed urination  In the beginning, try to keep from urinating for 5 minutes after you first feel the need to go.  While you wait, take deep, slow breaths to relax. Kegel exercises can also help you delay the need to go to the bathroom.  After some practice, when you can easily wait 5 minutes to urinate, try to wait 10 minutes before you urinate.  Slowly increase the waiting period until you are able to control when you have to urinate.  Scheduled urination  Empty your bladder when you first wake up in the morning.  Schedule times throughout the day when you will urinate.  Start by going to the bathroom every hour, even if you don't need to go.  Slowly increase the time between trips to the bathroom.  When you have  "found a schedule that works well for you, keep doing it.  If you wake up during the night and have to urinate, do it. Apply your schedule to waking hours only.  Kegel exercises  These tighten and strengthen pelvic muscles, which can help you control the flow of urine. (If doing these exercises causes pain, stop doing them and talk with your doctor.) To do Kegel exercises:  Squeeze your muscles as if you were trying not to pass gas. Or squeeze your muscles as if you were stopping the flow of urine. Your belly, legs, and buttocks shouldn't move.  Hold the squeeze for 3 seconds, then relax for 5 to 10 seconds.  Start with 3 seconds, then add 1 second each week until you are able to squeeze for 10 seconds.  Repeat the exercise 10 times a session. Do 3 to 8 sessions a day.  When should you call for help?  Watch closely for changes in your health, and be sure to contact your doctor if:    Your incontinence is getting worse.     You do not get better as expected.   Where can you learn more?  Go to https://www.[employer].net/patiented  Enter V684 in the search box to learn more about \"Bladder Training: Care Instructions.\"  Current as of: February 28, 2023               Content Version: 13.8    4127-3327 [employer].   Care instructions adapted under license by your healthcare professional. If you have questions about a medical condition or this instruction, always ask your healthcare professional. [employer], [employer] disclaims any warranty or liability for your use of this information.         "

## 2024-01-29 NOTE — PROGRESS NOTES
"Preventive Care Visit  [facility]  Amarilis Delgado MD, Family Medicine  Jan 29, 2024      SUBJECTIVE:   Calin is a 68 year old, presenting for the following:  Wellness Visit        1/29/2024     9:15 AM   Additional Questions   Roomed by Enedelia VALDEZ LPN     Are you in the first 12 months of your Medicare coverage?  No    Healthy Habits:     In general, how would you rate your overall health?  Good    Frequency of exercise:  4-5 days/week    Do you usually eat at least 4 servings of fruit and vegetables a day, include whole grains    & fiber and avoid regularly eating high fat or \"junk\" foods?  Yes    Taking medications regularly:  Yes    Medication side effects:  None    Ability to successfully perform activities of daily living:  No assistance needed    Home Safety:  No safety concerns identified    Hearing Impairment:  No hearing concerns    In the past 6 months, have you been bothered by leaking of urine? Yes    In general, how would you rate your overall mental or emotional health?  Good    Additional concerns today:  Yes    No concerns about memory per patient.    Achilles tendinitis:  Left, seeing ortho and will do PT.  Has some shoe inserts.      Bladder:  Had surgery.  Leakage if she does not get to bathroom right away and will leak with coughing.     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    Social:  Traveling to [place] and [place] in Sept.    Annual Preventive Visit (Submitted on 1/22/2024)  Chief Complaint: Annual Exam:  Blood in stool: No  heartburn: No  peripheral edema: No  mood changes: No  Skin sensation changes: No  tenderness: No  breast mass: No  breast discharge: No    Today's PHQ-9 Score:       1/29/2024     9:01 AM   PHQ-9 SCORE   PHQ-9 Total Score MyChart 2 (Minimal depression)   PHQ-9 Total Score 2         8/10/2022     2:35 PM " 2024     9:02 AM   GUADALUPE-7 SCORE   Total Score 0 (minimal anxiety) 1 (minimal anxiety)   Total Score 0 1     Via the Health Maintenance questionnaire, the patient has reported the following services have been completed -Mammogram, this information has been sent to the abstraction team.    Have you ever done Advance Care Planning? (For example, a Health Directive, POLST, or a discussion with a medical provider or your loved ones about your wishes): Yes, advance care planning is on file.    Fall risk  Fallen 2 or more times in the past year?: No  Any fall with injury in the past year?: No    Cognitive Screening   1) Repeat 3 items (Leader, Season, Table)    2) Clock draw: ABNORMAL Numbers incorrect  3) 3 item recall: Recalls 3 objects  Results: ABNORMAL clock, 1-2 items recalled: PROBABLE COGNITIVE IMPAIRMENT, **INFORM PROVIDER**    Reviewed and updated as needed this visit by clinical staff   Tobacco  Allergies  Meds              Reviewed and updated as needed this visit by Provider                  Social History     Tobacco Use    Smoking status: Former     Types: Cigarettes     Quit date: 10/19/1987     Years since quittin.3    Smokeless tobacco: Never    Tobacco comments:     30 years ago   Substance Use Topics    Alcohol use: Yes     Alcohol/week: 3.0 standard drinks of alcohol           2024    10:58 AM   Alcohol Use   Prescreen: >3 drinks/day or >7 drinks/week? No     Do you have a current opioid prescription? No  Do you use any other controlled substances or medications that are not prescribed by a provider? None              Current providers sharing in care for this patient include:   Patient Care Team:  Amarilis Delgado MD as PCP - General (Family Medicine)  Tian Bal MD as MD (Gastroenterology)  Amarilis Delgado MD as Assigned PCP  Brooke Lizarraga PA-C as Physician Assistant (Physician Assistant - Medical)  Mack Garcia PA-C as Physician Assistant  (Gastroenterology)  Miley Nielsen PA-C as Assigned Musculoskeletal Provider  Mack Garcia PA-C as Assigned Gastroenterology Provider    The following health maintenance items are reviewed in Epic and correct as of today:  Health Maintenance   Topic Date Due    RSV VACCINE (Pregnancy & 60+) (1 - 1-dose 60+ series) Completed    ANNUAL REVIEW OF HM ORDERS  Today    COVID-19 Vaccine (5 - 2023-24 season) Declined    MEDICARE ANNUAL WELLNESS VISIT  Today    MAMMO SCREENING  Scheduled    DEXA  06/21/2024, ordered    FALL RISK ASSESSMENT  Today    PAP FOLLOW-UP  01/25/2026    HPV FOLLOW-UP  01/25/2026    COLORECTAL CANCER SCREENING  01/19/2028    LIPID  Today    ADVANCE CARE PLANNING  We would like a copy please    DTAP/TDAP/TD IMMUNIZATION (4 - Td or Tdap) 10/10/2029    PHQ-2 (once per calendar year)  Completed    INFLUENZA VACCINE  Completed    Pneumococcal Vaccine: 65+ Years  Completed    ZOSTER IMMUNIZATION  Completed    IPV IMMUNIZATION  Aged Out    HPV IMMUNIZATION  Aged Out    MENINGITIS IMMUNIZATION  Aged Out    RSV MONOCLONAL ANTIBODY  NA    HEPATITIS C SCREENING  Discontinued     Lab work is in process        Review of Systems   Constitutional:  Negative for chills and fever.   HENT:  Negative for congestion, ear pain, hearing loss and sore throat.    Eyes:  Negative for pain and visual disturbance.   Respiratory:  Negative for cough and shortness of breath.    Cardiovascular:  Negative for chest pain and palpitations.   Gastrointestinal:  Positive for constipation. Negative for abdominal pain, diarrhea and nausea.   Genitourinary:  Negative for dysuria, frequency, genital sores, hematuria, pelvic pain, urgency, vaginal bleeding and vaginal discharge.   Musculoskeletal:  Positive for joint swelling. Negative for arthralgias and myalgias.   Skin:  Negative for rash.   Neurological:  Negative for dizziness, weakness and headaches.   Psychiatric/Behavioral:  The patient is not nervous/anxious.           OBJECTIVE:  "  /80   Pulse 60   Temp 97.7  F (36.5  C) (Oral)   Resp 16   Ht 1.635 m (5' 4.37\")   Wt 67.2 kg (148 lb 3.2 oz)   SpO2 93%   BMI 25.15 kg/m     Estimated body mass index is 25.15 kg/m  as calculated from the following:    Height as of this encounter: 1.635 m (5' 4.37\").    Weight as of this encounter: 67.2 kg (148 lb 3.2 oz).  Physical Exam  GENERAL: alert and no distress  EYES: Eyes grossly normal to inspection, PERRL and conjunctivae and sclerae normal  HENT: ear canals and TM's normal, nose and mouth without ulcers or lesions  NECK: no adenopathy, no asymmetry, masses, or scars  RESP: lungs clear to auscultation - no rales, rhonchi or wheezes  BREAST: normal without masses, tenderness or nipple discharge and no palpable axillary masses or adenopathy  CV: regular rate and rhythm, normal S1 S2, no S3 or S4, no murmur, click or rub, no peripheral edema  ABDOMEN: soft, nontender, no hepatosplenomegaly, no masses and bowel sounds normal  MS: no gross musculoskeletal defects noted, no edema  SKIN: no suspicious lesions or rashes  NEURO: Normal strength and tone, mentation intact and speech normal  PSYCH: mentation appears normal, affect normal/bright  LYMPH: no cervical, supraclavicular, axillary, or inguinal adenopathy    Diagnostic Test Results:  Labs reviewed in Epic    ASSESSMENT / PLAN:       ICD-10-CM    1. Encounter for Medicare annual wellness exam  Z00.00       2. Unspecified mood (affective) disorder (H24)  F39       3. History of basal cell carcinoma  Z85.828       4. Essential hypertension  I10 Comprehensive metabolic panel (BMP + Alb, Alk Phos, ALT, AST, Total. Bili, TP)     CBC with platelets     Comprehensive metabolic panel (BMP + Alb, Alk Phos, ALT, AST, Total. Bili, TP)     CBC with platelets      5. Adenomatous polyp of colon, unspecified part of colon-Multiple adenomatous  D12.6       6. Hyperlipidemia LDL goal <130  E78.5 Lipid panel reflex to direct LDL Fasting     Lipid panel reflex " "to direct LDL Fasting      7. Impaired fasting glucose  R73.01 Hemoglobin A1c     TSH with free T4 reflex     Hemoglobin A1c     TSH with free T4 reflex      8. Osteopenia  M89.9     M94.9       9. Mixed incontinence  N39.46 Adult Urology [facility] Referral      10. Menopause  Z78.0 DX Hip/Pelvis/Spine      11. Vitamin D deficiency  E55.9 Vitamin D Deficiency     Vitamin D Deficiency      12. Abnormal findings on diagnostic imaging of other specified body structures  R93.89 TSH with free T4 reflex     TSH with free T4 reflex        With your plans to travel to [place] and [place] in September I would recommend a COVID shot before then and there may be an new 1 in the fall that is unknown at this time.  Also please check the CDC website to see if any other immunizations are needed like typhoid malaria etc. but I doubt those are needed.  You are up-to-date on your hepatitis A, hepatitis B and tetanus and flu.    Following with ortho for left Achilles tendinitis, please follow-up with them for further steps, like physical therapy.    Placing a referral to [facility] for the mixed incontinence.    I am glad you see Dermatology for history of skin cancer for full body skin check.  Please also ask her for about the purple lesions on your arms.    Let us know if you or family have any memory concerns.    Patient has been advised of split billing requirements and indicates understanding: Yes      Counseling  Reviewed preventive health counseling, as reflected in patient instructions       Fall risk prevention      BMI  Estimated body mass index is 25.15 kg/m  as calculated from the following:    Height as of this encounter: 1.635 m (5' 4.37\").    Weight as of this encounter: 67.2 kg (148 lb 3.2 oz).         She reports that she quit smoking about 36 years ago. Her smoking use included cigarettes. She has never used smokeless tobacco.      Appropriate preventive services were discussed with this patient, including " applicable screening as appropriate for fall prevention, nutrition, physical activity, Tobacco-use cessation, weight loss and cognition.  Checklist reviewing preventive services available has been given to the patient.    Reviewed patients plan of care and provided an AVS. The Basic Care Plan (routine screening as documented in Health Maintenance) for Calin meets the Care Plan requirement. This Care Plan has been established and reviewed with the Patient.          Signed Electronically by: Amarilis Delgado MD    Identified Health Risks  I have reviewed Opioid Use Disorder and Substance Use Disorder risk factors and made any needed referrals.

## 2024-01-30 DIAGNOSIS — E78.5 HYPERLIPIDEMIA LDL GOAL <130: Primary | ICD-10-CM

## 2024-01-30 RX ORDER — ROSUVASTATIN CALCIUM 5 MG/1
5 TABLET, COATED ORAL DAILY
Qty: 90 TABLET | Refills: 3 | Status: SHIPPED | OUTPATIENT
Start: 2024-01-30

## 2024-02-13 ENCOUNTER — OFFICE VISIT (OUTPATIENT)
Dept: UROLOGY | Facility: CLINIC | Age: 69
End: 2024-02-13
Attending: FAMILY MEDICINE
Payer: COMMERCIAL

## 2024-02-13 VITALS
DIASTOLIC BLOOD PRESSURE: 84 MMHG | HEIGHT: 64 IN | OXYGEN SATURATION: 100 % | TEMPERATURE: 98 F | SYSTOLIC BLOOD PRESSURE: 126 MMHG | WEIGHT: 148 LBS | HEART RATE: 73 BPM | BODY MASS INDEX: 25.27 KG/M2

## 2024-02-13 DIAGNOSIS — N39.46 MIXED INCONTINENCE: ICD-10-CM

## 2024-02-13 PROCEDURE — 99203 OFFICE O/P NEW LOW 30 MIN: CPT | Performed by: STUDENT IN AN ORGANIZED HEALTH CARE EDUCATION/TRAINING PROGRAM

## 2024-02-13 ASSESSMENT — PAIN SCALES - GENERAL: PAINLEVEL: NO PAIN (0)

## 2024-02-13 NOTE — PROGRESS NOTES
Chief Complaint:   Urinary incontinence         History of Present Illness:   Calin Aguilar is a 68 year old female with a history of HLD, HTN, and vaginal prolapse who presents for evaluation of urinary incontinence.     The patient reports a 1-2 year history of urinary incontinence. She reports a history of urethral sling placement in her 40s. She reports some urge incontinence in the morning. She also reports leakage with coughing and laughing. She is not wearing pads. She reports nocturia x 0-1. She denies dysuria and gross hematuria.     She denies a history of other urologic surgeries or procedures.     She reports ongoing constipation.     She has a history of three vaginal deliveries.          Past Medical History:     Past Medical History:   Diagnosis Date    Full incontinence of feces     Created by Conversion     Impaired fasting glucose     Created by Conversion             Past Surgical History:     Past Surgical History:   Procedure Laterality Date    HAND SURGERY Left about 2010    For 1st [facility] OA    HAND SURGERY Right 02/2019    For 1st [facility] OA and carpal tunnel    [place] COLPOSCOPY,CERVIX W/ADJ VAGINA,W/BX      Description: Colposcopy Cervix With Biopsy(S);  Proc Date: 03/28/1997;  Comments: mild dysplasia with koilocytosis, and endocervical polyp    ZZC LIGATE FALLOPIAN TUBE      Description: Tubal Ligation;  Recorded: 01/02/2009;            Medications     Current Outpatient Medications   Medication    Ascorbic Acid (VITAMIN C PO)    azelastine (ASTELIN) 0.1 % nasal spray    buPROPion (WELLBUTRIN XL) 300 MG 24 hr tablet    cholecalciferol, vitamin D3, 1,000 unit tablet    Fiber POWD    ibuprofen (ADVIL/MOTRIN) 800 MG tablet    linaclotide (LINZESS) 72 MCG capsule    losartan (COZAAR) 50 MG tablet    Multiple Vitamin (MULTIVITAMIN PO)    Niacin (VITAMIN B-3 OR)    NON FORMULARY    Omega-3 Fatty Acids (FISH OIL PO)    polyethylene glycol (MIRALAX) 17 g packet    Probiotic Product (UP4 PROBIOTICS  "WOMENS) CAPS    Pyridoxine HCl (B-6) 100 MG TABS    rosuvastatin (CRESTOR) 5 MG tablet    vit A/vit C/vit E/zinc/copper (PRESERVISION AREDS ORAL)     No current facility-administered medications for this visit.            Allergies:   Codeine and Lisinopril         Review of Systems:  From intake questionnaire   Negative 14 system review except as noted on HPI, nurse's note.         Physical Exam:   Patient is a 68 year old  female   Vitals: Blood pressure 126/84, pulse 73, temperature 98  F (36.7  C), temperature source Tympanic, height 1.635 m (5' 4.37\"), weight 67.1 kg (148 lb), SpO2 100%.  General Appearance Adult: Alert, no acute distress, oriented.  Lungs: Non-labored breathing.  Heart: No obvious jugular venous distension present.  Neuro: Alert, oriented, speech and mentation normal  :      Examined with speculum.        Normal external genitalia and introitus, no lesions.      Periurethral: nontender without visible or palpable masses, no urethral discharge or bleeding.      Rectal exam deferred.      Labs and Pathology:    I personally reviewed all applicable laboratory data and went over findings with patient  Significant for:    CBC RESULTS:  Recent Labs   Lab Test 01/29/24  0959 01/25/23  1107 08/10/22  1626 04/11/22  1201   WBC 5.8 5.2 6.1 6.4   HGB 14.6 13.5 14.4 14.0    233 233 253        BMP RESULTS:  Recent Labs   Lab Test 01/29/24  0959 01/25/23  1107 08/10/22  1626 01/24/22  0822 12/17/20  1107 05/06/20  1002 10/10/19  1221 04/04/19  1058 10/01/18  1233    141 139 142 140 142 140  --  142   POTASSIUM 4.6 4.4 4.2 4.7 4.6 4.0 4.4   < > 4.8   CHLORIDE 104 105 101 105 105 107 105  --  105   CO2 25 24 24 28 25 26 27  --  27   ANIONGAP 11 12 14 9 10 9 8  --  10   GLC 93 90 98 94 77 100 90  --  85   BUN 25.4* 19.0 22.0 19 22 17 20  --  20   CR 0.91 0.78 0.89 0.83 0.79 0.78 0.88  --  0.83   GFRESTIMATED 68 82 70 76 >60 >60 >60  --  >60   GFRESTBLACK  --   --   --   --  >60 >60 >60  --  >60 "   SHELBI 9.8 9.3 9.9 9.9 9.0 9.1 9.9  --  9.8    < > = values in this interval not displayed.       UA RESULTS:   Recent Labs   Lab Test 05/27/22  1733 05/26/22  1324   SG 1.015  --    URINEPH 6.0  --    NITRITE Negative  --    RBCU >100* 0-2   WBCU >100* 0-5            Assessment and Plan:     Assessment: 68 year old female seen in evaluation for mixed urinary incontinence, primarily stress. She reports a history of a urethral sling placement in her 40s.     We discussed the etiologies of stress and urge incontinence and how they differ. We discussed treatment options for stress incontinence including pelvic floor PT, or consideration of procedural options with one of our urogynecologists. We then discussed that urge incontinence treatments include observation, pelvic floor PT, and anticholinergics. The patient is most interested in procedural options for management of stress incontinence.   She will meet with one of our urogynecologists to consider her options.     Plan:  Consult with urogynecology for consideration of a repeat procedure for management of stress incontinence.     BRYON COKER PA-C  Department of Urology

## 2024-02-13 NOTE — NURSING NOTE
"Initial /84   Pulse 73   Temp 98  F (36.7  C) (Tympanic)   Ht 1.635 m (5' 4.37\")   Wt 67.1 kg (148 lb)   SpO2 100%   BMI 25.11 kg/m   Estimated body mass index is 25.11 kg/m  as calculated from the following:    Height as of this encounter: 1.635 m (5' 4.37\").    Weight as of this encounter: 67.1 kg (148 lb). .  Keyla ANDERSON CMA 02/13/24 8:51 AM  "

## 2024-02-14 ENCOUNTER — ANCILLARY PROCEDURE (OUTPATIENT)
Dept: MAMMOGRAPHY | Facility: CLINIC | Age: 69
End: 2024-02-14
Attending: FAMILY MEDICINE
Payer: COMMERCIAL

## 2024-02-14 DIAGNOSIS — Z12.31 VISIT FOR SCREENING MAMMOGRAM: ICD-10-CM

## 2024-02-14 PROCEDURE — 77063 BREAST TOMOSYNTHESIS BI: CPT

## 2024-02-16 ENCOUNTER — TELEPHONE (OUTPATIENT)
Dept: UROLOGY | Facility: CLINIC | Age: 69
End: 2024-02-16
Payer: COMMERCIAL

## 2024-02-16 NOTE — TELEPHONE ENCOUNTER
LVM and callback to schedule visit with Dr Rivas at the [facility]. Options available 2/28 and 2/29.

## 2024-02-16 NOTE — TELEPHONE ENCOUNTER
[facility] Call Center    Phone Message    May a detailed message be left on voicemail: yes     Reason for Call: Other: Patient calls, she has a referral from Daniella Montemayor to see Dr. Green for mixed incontinence. Sending TE for review per protocols. Patient states that she will be unavailable 3/6-3/22     Action Taken: Message routed to:  Other: Urology    Travel Screening: Not Applicable

## 2024-02-18 NOTE — PROGRESS NOTES
ASSESSMENT: Calin Aguilar is a 67 year old female with past medical history significant for hyperlipidemia, hypertension, osteopenia, insomnia, anxiety who presents today for new patient evaluation of 2 areas of pain.  1.  Acute on chronic bilateral low back pain.  An x-ray lumbar spine from 2018 showed lumbar facet arthropathy L3-4, L4-5, L5-S1.  Pain is most consistent with lumbar facet arthropathy.  She had reproduction of pain with lumbar facet loading maneuvers bilaterally.  2.  Chronic left lateral hip pain.  This pain is most consistent with trochanteric bursitis.  - Patient also had some left posterior knee pain 2 weeks ago.  She saw [facility] orthopedics.  They told her she had a Baker's cyst.  That pain has almost completely resolved with oral prednisone.  - Patient neurologically intact.    PLAN:  A shared decision making model was used.  The patient's values and choices were respected.  The following represents what was discussed and decided upon by the physician assistant and the patient.      1.  DIAGNOSTIC TESTS:    -I reviewed lumbar spine flexion-extension x-rays from 2018.  - I ordered an MRI lumbar spine for further evaluation.  Patient has had low back pain for over 20 years.  She endorses worsening of fecal smearing over the past couple of weeks.    2.  PHYSICAL THERAPY: Patient is scheduled to begin physical therapy for her knee at [facility] next week.  I provided a new order that we will send to [facility] so that they also treat her low back and left hip pain.      3.  MEDICATIONS:  - Ativan 0.5 mg, #2 with no refills was prescribed for claustrophobia before her MRI.  -No other changes made to patient's medications.  She can continue Tylenol and ibuprofen as needed.    4.  INTERVENTIONS: No interventions were ordered.  Patient is going to trial physical therapy first.  - If left lateral hip pain fails to improve I would recommend a left trochanteric bursa injection  - If axial  low back pain fails to improve I would likely recommend bilateral L3, 4, 5 medial branch blocks as a work-up for radiofrequency ablation, pending the results of her MRI.    5.  PATIENT EDUCATION: Patient is in agreement the above plan.  All questions were answered.    6.  FOLLOW-UP: I will send the patient a [employer] message with her MRI results.  Patient will follow up with me in 6 weeks to monitor progress with physical therapy.  If she has questions or concerns, she should not hesitate to call.      SUBJECTIVE:  Calin Aguilar  Is a 67 year old female who presents today in consultation at request of Dr. Delgado for new patient evaluation of low back pain and left lateral hip pain.    Patient reports that she has had chronic low back pain for over 20 years.  She states that she gets episodic flares of pain.  Most recent flare began 2 weeks ago.  She denies any injury or event at that time to cause the pain.  Around that same time she also had an increase in her chronic left lateral hip pain and pain at the left posterior knee.  She went to a chiropractor who took x-rays.  They told her she had disc degeneration at L4-5 and L5-S1.  They recommended a treatment plan but she did not pursue it because her insurance does not provide good coverage for chiropractic treatment.  1 week later she went to [facility] because of left posterior knee pain.  They told her she had a Baker's cyst.  They gave her prednisone which provided significant relief of her knee pain and partial relief of her low back and left hip pain.    Patient complains of bilateral low back pain.  Pain spans across the low back in a broadband distribution at the belt line.  Both sides are equally affected.  She states that her back feels achy and stiff.  Pain is aggravated with standing too long.  She states her back feels very stiff in the morning.  As she loosens up later in the morning her back pain improves.    Patient also complains of left  "lateral hip pain.  She does not feel like this pain comes from her back.  Pain is worse when she lies on her left side at night.    Patient denies any numbness or tingling down the legs.  She states that she feels intermittent weakness in the left leg as if it gives way from under her.  Patient reports that she has chronic issues with her bowels.  She has chronic constipation and she goes to \"colon therapy.\"  She states they do a hydrotherapy which is somewhat helpful, but she deals with intermittent bowel incontinence.  She states that recently she has had more fecal smearing.  Denies loss of bladder control.  She does have a history of bladder surgery.  Denies recent fevers, chills, sweats.    As mentioned above, patient went to the chiropractor couple of weeks ago.  They did 1 treatment which was somewhat helpful.  She has never had any spine surgeries or spine injections.  She has not had physical therapy dedicated to her back or hip.  She takes ibuprofen as needed which is somewhat helpful.  She occasionally uses Tylenol but ibuprofen works better.    Current Outpatient Medications   Medication     albuterol (PROAIR HFA/PROVENTIL HFA/VENTOLIN HFA) 108 (90 Base) MCG/ACT inhaler     azelastine (ASTELIN) 0.1 % nasal spray     azelastine (ASTELIN) 137 mcg (0.1 %) nasal spray     buPROPion (WELLBUTRIN XL) 150 MG 24 hr tablet     cholecalciferol, vitamin D3, 1,000 unit tablet     ibuprofen (ADVIL/MOTRIN) 800 MG tablet     losartan (COZAAR) 25 MG tablet     magnesium 250 MG tablet     NON FORMULARY     Pyridoxine HCl (B-6) 100 MG TABS     Rhubarb (ESTROVEN COMPLETE PO)     vit A/vit C/vit E/zinc/copper (PRESERVISION AREDS ORAL)         Allergies   Allergen Reactions     Codeine Unknown     Nausea     Lisinopril Unknown     Cough and lightheaded       Past Medical History:   Diagnosis Date     Benign neoplasm of colon     Created by Conversion      Colon polyps      Dysuria     Created by Conversion      Full " incontinence of feces     Created by Conversion      Impaired fasting glucose     Created by Conversion      Urinary tract infection, site not specified     Created by Conversion         Patient Active Problem List   Diagnosis     Osteopenia     Insomnia     Osteoarthrosis     Anxiety Disorder NOS     Hyperlipidemia     Adenomatous polyp of colon, unspecified part of colon-Multiple adenomatous     History of basal cell carcinoma     HTN (hypertension)       Past Surgical History:   Procedure Laterality Date     HAND SURGERY Left about 2010    For 1st [facility] OA     HAND SURGERY Right 02/2019    For 1st [facility] OA and carpal tunnel     [place] COLPOSCOPY,CERVIX W/ADJ VAGINA,W/BX      Description: Colposcopy Cervix With Biopsy(S);  Proc Date: 03/28/1997;  Comments: mild dysplasia with koilocytosis, and endocervical polyp     ZZC LIGATE FALLOPIAN TUBE      Description: Tubal Ligation;  Recorded: 01/02/2009;       Family History   Problem Relation Age of Onset     Breast Cancer Mother 58.00     Hypothyroidism Mother      Osteoporosis Mother      Hypertension Mother      Depression Mother      Hypertension Father      Heart Disease Father      Cerebrovascular Disease Paternal Aunt      Depression Paternal Grandfather      Depression Maternal Grandfather      Anuerysm Paternal Uncle      Diabetes Paternal Grandmother      Hypertension Sister      Ovarian Cancer No family hx of        Social history: Patient is retired.  She worked for [employer].  She admits to alcohol use.  Denies tobacco or illicit drug use.  She is .      ROS: Positive for constipation, loss of bowel control, joint pain, sciatica.  Specifically negative for bowel/bladder dysfunction, fevers,chills, appetite changes, unexplained weight loss.   Otherwise 13 systems reviewed are negative.  Please see the patient's intake questionnaire from today for details.      OBJECTIVE:  PHYSICAL EXAMINATION:    CONSTITUTIONAL:  Vital signs as above.  No acute distress.  The  patient is well nourished and well groomed.  PSYCHIATRIC:  The patient is awake, alert, oriented to person, place, time and answering questions appropriately with clear speech.    HEENT: Normocephalic, atraumatic.  Sclera clear.  Neck is supple.  SKIN:  Skin over the face, bilateral lower extremities, and posterior torso is clean, dry, intact without rashes.    GAIT:  Gait is non-antalgic.  The patient is able to heel and toe walk without significant difficulty.    STANDING EXAMINATION: No significant tenderness palpation bilateral lower lumbar paraspinous muscles.  Lumbar flexion mildly restricted.  Lumbar extension moderately restricted.  Lumbar facet loading maneuvers reproduce low back pain bilaterally.  MUSCLE STRENGTH:  The patient has 5/5 strength for the bilateral hip flexors, knee flexors/extensors, ankle dorsiflexors/plantar flexors, great toe extensors, ankle evertors/invertors.  NEUROLOGICAL: 2+ patellar, and achilles reflexes bilaterally.  Negative Babinski's bilaterally.  No ankle clonus bilaterally. Sensation to light touch is intact in the bilateral L4, L5, and S1 dermatomes.  VASCULAR:  2/4 dorsalis pedis pulses bilaterally.  Bilateral lower extremities are warm.  There is no pitting edema of the bilateral lower extremities.  ABDOMINAL:  Soft, non-distended, non-tender throughout all quadrants.  No pulsatile mass palpated in the left lower quadrant.  LYMPH NODES:  No palpable or tender inguinal lymph nodes.  MUSCULOSKELETAL: No tenderness palpation bilateral posterior knees.  Tender to palpation left greater trochanter.  Straight leg raise negative bilaterally.    RESULTS:    I reviewed lumbar spine flexion-extension x-rays dated March 14, 2018.  This shows 2 mm retrolisthesis of L2 in both flexion and extension.  There is moderate to advanced facet arthropathy L3-4 through L5-S1.     Ambulatory

## 2024-02-28 ENCOUNTER — TRANSFERRED RECORDS (OUTPATIENT)
Dept: HEALTH INFORMATION MANAGEMENT | Facility: CLINIC | Age: 69
End: 2024-02-28
Payer: COMMERCIAL

## 2024-03-22 ENCOUNTER — E-VISIT (OUTPATIENT)
Dept: FAMILY MEDICINE | Facility: CLINIC | Age: 69
End: 2024-03-22
Payer: COMMERCIAL

## 2024-03-22 DIAGNOSIS — I10 ESSENTIAL HYPERTENSION: ICD-10-CM

## 2024-03-22 DIAGNOSIS — J01.90 ACUTE BACTERIAL SINUSITIS: Primary | ICD-10-CM

## 2024-03-22 DIAGNOSIS — B96.89 ACUTE BACTERIAL SINUSITIS: Primary | ICD-10-CM

## 2024-03-22 PROCEDURE — 99421 OL DIG E/M SVC 5-10 MIN: CPT | Performed by: PHYSICIAN ASSISTANT

## 2024-03-22 NOTE — PATIENT INSTRUCTIONS
Thank you for choosing us for your care. I have placed an order for a prescription so that you can start treatment. View your full visit summary for details by clicking on the link below. Your pharmacist will able to address any questions you may have about the medication.     If you're not feeling better within 5-7 days, please schedule an appointment.  You can schedule an appointment right here in [facility], or call [phone]  If the visit is for the same symptoms as your eVisit, we'll refund the cost of your eVisit if seen within seven days.

## 2024-03-23 DIAGNOSIS — J31.0 CHRONIC RHINITIS: ICD-10-CM

## 2024-03-23 RX ORDER — LOSARTAN POTASSIUM 50 MG/1
50 TABLET ORAL DAILY
Qty: 90 TABLET | Refills: 0 | Status: SHIPPED | OUTPATIENT
Start: 2024-03-23 | End: 2024-06-17

## 2024-03-25 ENCOUNTER — TRANSFERRED RECORDS (OUTPATIENT)
Dept: HEALTH INFORMATION MANAGEMENT | Facility: CLINIC | Age: 69
End: 2024-03-25
Payer: COMMERCIAL

## 2024-03-25 RX ORDER — AZELASTINE HYDROCHLORIDE 137 UG/1
2 SPRAY, METERED NASAL DAILY
Qty: 30 ML | Refills: 4 | Status: SHIPPED | OUTPATIENT
Start: 2024-03-25

## 2024-03-27 ENCOUNTER — OFFICE VISIT (OUTPATIENT)
Dept: UROLOGY | Facility: CLINIC | Age: 69
End: 2024-03-27
Payer: COMMERCIAL

## 2024-03-27 ENCOUNTER — TELEPHONE (OUTPATIENT)
Dept: UROLOGY | Facility: CLINIC | Age: 69
End: 2024-03-27

## 2024-03-27 ENCOUNTER — PREP FOR PROCEDURE (OUTPATIENT)
Dept: UROLOGY | Facility: CLINIC | Age: 69
End: 2024-03-27

## 2024-03-27 VITALS
OXYGEN SATURATION: 97 % | BODY MASS INDEX: 24.94 KG/M2 | WEIGHT: 147 LBS | DIASTOLIC BLOOD PRESSURE: 83 MMHG | SYSTOLIC BLOOD PRESSURE: 131 MMHG | HEART RATE: 69 BPM

## 2024-03-27 DIAGNOSIS — N39.3 SUI (STRESS URINARY INCONTINENCE, FEMALE): Primary | ICD-10-CM

## 2024-03-27 DIAGNOSIS — N39.46 MIXED INCONTINENCE: ICD-10-CM

## 2024-03-27 LAB
ALBUMIN UR-MCNC: NEGATIVE MG/DL
APPEARANCE UR: CLEAR
BILIRUB UR QL STRIP: NEGATIVE
COLOR UR AUTO: YELLOW
GLUCOSE UR STRIP-MCNC: NEGATIVE MG/DL
HGB UR QL STRIP: NEGATIVE
KETONES UR STRIP-MCNC: NEGATIVE MG/DL
LEUKOCYTE ESTERASE UR QL STRIP: NEGATIVE
NITRATE UR QL: NEGATIVE
PH UR STRIP: 6 [PH] (ref 5–7)
SP GR UR STRIP: 1.02 (ref 1–1.03)
UROBILINOGEN UR STRIP-ACNC: 0.2 E.U./DL

## 2024-03-27 PROCEDURE — 99215 OFFICE O/P EST HI 40 MIN: CPT | Mod: 25 | Performed by: UROLOGY

## 2024-03-27 PROCEDURE — 52000 CYSTOURETHROSCOPY: CPT | Performed by: UROLOGY

## 2024-03-27 PROCEDURE — 81003 URINALYSIS AUTO W/O SCOPE: CPT | Performed by: UROLOGY

## 2024-03-27 NOTE — PROGRESS NOTES
Calin Aguilar is a 69 year old female seen in consultation for incontinence. Consult from Amarilis Delgado    30 years ago: sling    24 Daniella Treviño: recommend urogynecology eval, chest pain, lymphadenopathy      Now with mixed UI, urge = stress, no significant insensate component, no pad day or nite. Progressive over the last year.     Denies dysuria, gross hematuria, daytime frequency; nocturia x 1.    Hx 3 vag deliveries, no hyster, not on HRT.    Chronic constpation, some FI (previously was worse).    Drinks 2 caf coffee, 2 [place], otherwise moderate. (No voiding diary).      Reviewed PMH in detail including HTN, HLD, depression, constipation, fecal incontinence      Past Medical History:   Diagnosis Date    Full incontinence of feces     Created by Conversion     Impaired fasting glucose     Created by Conversion        Past Surgical History:   Procedure Laterality Date    HAND SURGERY Left about     For 1st [facility] OA    HAND SURGERY Right 2019    For 1st [facility] OA and carpal tunnel    [place] COLPOSCOPY,CERVIX W/ADJ VAGINA,W/BX      Description: Colposcopy Cervix With Biopsy(S);  Proc Date: 1997;  Comments: mild dysplasia with koilocytosis, and endocervical polyp    ZZC LIGATE FALLOPIAN TUBE      Description: Tubal Ligation;  Recorded: 2009;       Social History     Socioeconomic History    Marital status:      Spouse name: Not on file    Number of children: Not on file    Years of education: Not on file    Highest education level: Not on file   Occupational History    Not on file   Tobacco Use    Smoking status: Former     Types: Cigarettes     Quit date: 10/19/1987     Years since quittin.4    Smokeless tobacco: Never    Tobacco comments:     30 years ago   Vaping Use    Vaping Use: Never used   Substance and Sexual Activity    Alcohol use: Yes     Alcohol/week: 3.0 standard drinks of alcohol    Drug use: No    Sexual activity: Not on file   Other Topics Concern    Not on file    Social History Narrative        The 10-year ASCVD risk score (Nacho GREEN Jr., et al., 2013) is: 7%    Values used to calculate the score:      Age: 63 years      Sex: Female      Is Non- : No      Diabetic: No      Tobacco smoker: No      Systolic Blood Pressur e: 126 mmHg      Is BP treated: Yes      HDL Cholesterol: 68 mg/dL      Total Cholesterol: 220 mg/dL       Social Determinants of Health     Financial Resource Strain: High Risk (1/22/2024)    Financial Resource Strain     Within the past 12 months, have you or your family members you live with been unable to get utilities (heat, electricity) when it was really needed?: Yes   Food Insecurity: Low Risk  (1/22/2024)    Food Insecurity     Within the past 12 months, did you worry that your food would run out before you got money to buy more?: No     Within the past 12 months, did the food you bought just not last and you didn t have money to get more?: No   Transportation Needs: Low Risk  (1/22/2024)    Transportation Needs     Within the past 12 months, has lack of transportation kept you from medical appointments, getting your medicines, non-medical meetings or appointments, work, or from getting things that you need?: No   Physical Activity: Not on file   Stress: Not on file   Social Connections: Not on file   Interpersonal Safety: Low Risk  (1/29/2024)    Interpersonal Safety     Do you feel physically and emotionally safe where you currently live?: Yes     Within the past 12 months, have you been hit, slapped, kicked or otherwise physically hurt by someone?: No     Within the past 12 months, have you been humiliated or emotionally abused in other ways by your partner or ex-partner?: No   Housing Stability: Low Risk  (1/22/2024)    Housing Stability     Do you have housing? : Yes     Are you worried about losing your housing?: No       Current Outpatient Medications   Medication Sig Dispense Refill    Ascorbic Acid (VITAMIN C PO)        "Azelastine HCl 137 MCG/SPRAY SOLN SPRAY 2 SPRAYS INTO EACH NOSTRIL EVERY DAY 30 mL 4    buPROPion (WELLBUTRIN XL) 300 MG 24 hr tablet Take 1 tablet (300 mg) by mouth every morning 90 tablet 3    cholecalciferol, vitamin D3, 1,000 unit tablet [CHOLECALCIFEROL, VITAMIN D3, 1,000 UNIT TABLET] Take 1,000 Units by mouth daily.      Fiber POWD Take by mouth daily      ibuprofen (ADVIL/MOTRIN) 800 MG tablet TAKE 1 TABLET (800 MG) BY MOUTH EVERY 8 HOURS AS NEEDED 90 tablet 3    losartan (COZAAR) 50 MG tablet TAKE 1 TABLET BY MOUTH EVERY DAY 90 tablet 0    Multiple Vitamin (MULTIVITAMIN PO)       NON FORMULARY [NON FORMULARY] Bone up- calcium      polyethylene glycol (MIRALAX) 17 g packet Take 1 packet by mouth daily      Probiotic Product (UP4 PROBIOTICS WOMENS) CAPS       rosuvastatin (CRESTOR) 5 MG tablet Take 1 tablet (5 mg) by mouth daily 90 tablet 3    vit A/vit C/vit E/zinc/copper (PRESERVISION AREDS ORAL)       amoxicillin-clavulanate (AUGMENTIN) 875-125 MG tablet Take 1 tablet by mouth 2 times daily for 7 days (Patient not taking: Reported on 3/27/2024) 14 tablet 0    linaclotide (LINZESS) 72 MCG capsule Take 1 capsule (72 mcg) by mouth every morning (before breakfast) (Patient not taking: Reported on 3/27/2024) 60 capsule 1    Niacin (VITAMIN B-3 OR)  (Patient not taking: Reported on 3/27/2024)      Omega-3 Fatty Acids (FISH OIL PO) Take 1 capsule by mouth daily (Patient not taking: Reported on 3/27/2024)      Pyridoxine HCl (B-6) 100 MG TABS  (Patient not taking: Reported on 3/27/2024)         Physical Exam:    GENL: NAD.  5'5\"  147#   ABD: Soft, non-tender, no masses.    EG: Well-estrogenized, no masses.    VAGINA: Well-estrogenized, no masses.    BN HYPERMOBILITY: Moderate.    CYSTOCELE: Grade 2.    APICAL PROLAPSE: None.    RECTOCELE: Mild.    BIMANUAL: No mass or tenderness.    Cysto:    (Informed consent obtained. Pause for cause performed)   Sterile prep.    17 Fr scope inserted through urethra. Systematic " examination w 70 degree lens.   PVR: 5 cc   MUCOSA: Normal without lesion   ORIFICES: Normal location and morphology   CAPACITY: 500 cc; no pain with filling   Scope withdrawn without untoward effect.    Valsalva:   Moderate hypermobility, marked leakage noted.    (Pt tolerated procedure without difficulty).      Results for orders placed or performed in visit on 03/27/24   UA reflex to Microscopic     Status: Normal   Result Value Ref Range    Color Urine Yellow Colorless, Straw, Light Yellow, Yellow    Appearance Urine Clear Clear    Glucose Urine Negative Negative mg/dL    Bilirubin Urine Negative Negative    Ketones Urine Negative Negative mg/dL    Specific Gravity Urine 1.025 1.003 - 1.035    Blood Urine Negative Negative    pH Urine 6.0 5.0 - 7.0    Protein Albumin Urine Negative Negative mg/dL    Urobilinogen Urine 0.2 0.2, 1.0 E.U./dL    Nitrite Urine Negative Negative    Leukocyte Esterase Urine Negative Negative    Narrative    Microscopic not indicated         IMP:  1. ALEJANDRO with hypermobility, hx sling 30 yrs ago      PLAN:  1. Discussed situation with patient in detail.    2. ALTIS SLING DISCUSSION: We discussed the patients situation in detail including her specific anatomy and conditions. Diagrams are drawn.    We discussed the surgical treatment including Altis pubovaginal sling utilizing mesh material. We addressed the technical aspects of the procedure as well as the potential risks and complications incuding, but not limited to bleeding, infection, damage to organs or other injury. We discussed the recovery process and the potential effect on sexual function in detail. She also understands the alternative forms of therapy (including no therapy and treatment without mesh), and the potential need for additional therapy. We discussed the FDA report on the use of surgical mesh. All questions are answered in detail. Informed consent is obtained. Consent form signed. Handout given.    Pt suggests that she  would like to proceed; orders in    3. Total time spent in reviewing patient records, discussing history, performing exam, discussing diagnosis, outlining treatment plan and documentation: 60 minutes

## 2024-03-28 PROBLEM — N39.3 SUI (STRESS URINARY INCONTINENCE, FEMALE): Status: ACTIVE | Noted: 2024-03-27

## 2024-03-28 NOTE — TELEPHONE ENCOUNTER
Type of surgery: Pubovaginal sling with Altis (N/A)   Location of surgery: [facility]  Date and time of surgery: 04/15/2024  Surgeon: Peter  Pre-Op Appt Date: 04/08/2024  Post-Op Appt Date: 05/15/2024   Packet sent out: Yes  Pre-cert/Authorization completed:  No  Date:

## 2024-04-01 SDOH — HEALTH STABILITY: PHYSICAL HEALTH: ON AVERAGE, HOW MANY DAYS PER WEEK DO YOU ENGAGE IN MODERATE TO STRENUOUS EXERCISE (LIKE A BRISK WALK)?: 5 DAYS

## 2024-04-01 SDOH — HEALTH STABILITY: PHYSICAL HEALTH: ON AVERAGE, HOW MANY MINUTES DO YOU ENGAGE IN EXERCISE AT THIS LEVEL?: 50 MIN

## 2024-04-01 ASSESSMENT — SOCIAL DETERMINANTS OF HEALTH (SDOH): HOW OFTEN DO YOU GET TOGETHER WITH FRIENDS OR RELATIVES?: ONCE A WEEK

## 2024-04-08 ENCOUNTER — OFFICE VISIT (OUTPATIENT)
Dept: FAMILY MEDICINE | Facility: CLINIC | Age: 69
End: 2024-04-08
Attending: FAMILY MEDICINE
Payer: COMMERCIAL

## 2024-04-08 VITALS
HEART RATE: 77 BPM | RESPIRATION RATE: 16 BRPM | DIASTOLIC BLOOD PRESSURE: 83 MMHG | BODY MASS INDEX: 24.92 KG/M2 | HEIGHT: 64 IN | OXYGEN SATURATION: 100 % | WEIGHT: 146 LBS | SYSTOLIC BLOOD PRESSURE: 122 MMHG | TEMPERATURE: 97.9 F

## 2024-04-08 DIAGNOSIS — N39.3 FEMALE STRESS INCONTINENCE: ICD-10-CM

## 2024-04-08 DIAGNOSIS — K21.00 GASTROESOPHAGEAL REFLUX DISEASE WITH ESOPHAGITIS WITHOUT HEMORRHAGE: ICD-10-CM

## 2024-04-08 DIAGNOSIS — R13.10 DYSPHAGIA, UNSPECIFIED TYPE: ICD-10-CM

## 2024-04-08 DIAGNOSIS — Z01.818 PREOP GENERAL PHYSICAL EXAM: Primary | ICD-10-CM

## 2024-04-08 LAB
ATRIAL RATE - MUSE: 80 BPM
DIASTOLIC BLOOD PRESSURE - MUSE: NORMAL MMHG
HGB BLD-MCNC: 14.2 G/DL (ref 11.7–15.7)
INTERPRETATION ECG - MUSE: NORMAL
P AXIS - MUSE: 76 DEGREES
PR INTERVAL - MUSE: 140 MS
QRS DURATION - MUSE: 74 MS
QT - MUSE: 356 MS
QTC - MUSE: 410 MS
R AXIS - MUSE: 33 DEGREES
SYSTOLIC BLOOD PRESSURE - MUSE: NORMAL MMHG
T AXIS - MUSE: 56 DEGREES
VENTRICULAR RATE- MUSE: 80 BPM

## 2024-04-08 PROCEDURE — 85018 HEMOGLOBIN: CPT | Performed by: FAMILY MEDICINE

## 2024-04-08 PROCEDURE — 93010 ELECTROCARDIOGRAM REPORT: CPT | Performed by: INTERNAL MEDICINE

## 2024-04-08 PROCEDURE — 99214 OFFICE O/P EST MOD 30 MIN: CPT | Mod: 25 | Performed by: FAMILY MEDICINE

## 2024-04-08 PROCEDURE — 93005 ELECTROCARDIOGRAM TRACING: CPT | Performed by: FAMILY MEDICINE

## 2024-04-08 PROCEDURE — 84132 ASSAY OF SERUM POTASSIUM: CPT | Performed by: FAMILY MEDICINE

## 2024-04-08 PROCEDURE — 36415 COLL VENOUS BLD VENIPUNCTURE: CPT | Performed by: FAMILY MEDICINE

## 2024-04-08 RX ORDER — FAMOTIDINE 20 MG/1
20 TABLET, FILM COATED ORAL 2 TIMES DAILY
Qty: 180 TABLET | Refills: 3 | Status: SHIPPED | OUTPATIENT
Start: 2024-04-08

## 2024-04-08 NOTE — PATIENT INSTRUCTIONS
Start Pepcid or Famotidine 20 mg twice a day.  I will send a prescription and you can see if that is cheaper as a prescription or over-the-counter.    GI consult to an MD for GERD to discuss medication and or EGD (scope).  Refer to [facility] if at this point.    Set lab appointment in about September for fasting cholesterol and I will also add an A1c for prediabetes.    No aspirin, ibuprofen or vitamins for 1 week before surgery.

## 2024-04-08 NOTE — PROGRESS NOTES
Preoperative Evaluation  [facility]  [address] [facility] [address]  Phone: [phone]  Fax: [phone]  Primary Provider: Amarilis Delgado  Pre-op Performing Provider: AMARILIS DELGADO  Apr 8, 2024       Calin is a 69 year old, presenting for the following:  Pre-Op Exam        4/8/2024    12:39 PM   Additional Questions   Roomed by Latesha TRAN CMA     Surgical Information  Surgery/Procedure: Pubovaginal sling with Altis   Surgery Location: [facility]   Surgeon: Dr. Green   Surgery Date: 4/15/2024  Time of Surgery: 1:00 pm   Where patient plans to recover: At home alone  Fax number for surgical facility: Note does not need to be faxed, will be available electronically in Epic.    Assessment & Plan     The proposed surgical procedure is considered INTERMEDIATE risk.      ICD-10-CM    1. Preop general physical exam  Z01.818 EKG 12-lead, tracing only     Hemoglobin     Potassium      2. Gastroesophageal reflux disease with esophagitis without hemorrhage  K21.00 Adult GI  Referral - Consult Only     famotidine (PEPCID) 20 MG tablet      3. Dysphagia, unspecified type  R13.10 Adult GI  Referral - Consult Only     famotidine (PEPCID) 20 MG tablet      4. Female stress incontinence  N39.3         Patient Instructions   Start Pepcid or Famotidine 20 mg twice a day.  I will send a prescription and you can see if that is cheaper as a prescription over-the-counter.    GI consult to an MD for GERD to discuss medication and or EGD (scope).  Refer to [facility] if at this point.    Set lab appointment in about September for fasting cholesterol and I will also add an A1c for prediabetes.    No aspirin, ibuprofen or vitamins for 1 week before surgery.            - No identified additional risk factors other than previously addressed    Antiplatelet or Anticoagulation Medication Instructions   - Patient is on no antiplatelet  or anticoagulation medications.    Additional Medication Instructions  Patient is to take all scheduled medications on the day of surgery  See after visit summary    Recommendation  APPROVAL GIVEN to proceed with proposed procedure, without further diagnostic evaluation.      30 minutes spent by me on the date of the encounter doing chart review, history and exam, documentation and further activities per the note    Subjective       HPI related to upcoming procedure: 69-year-old female expressed continence.  History of a bladder sling 25 years ago.  Symptoms returned about a year ago.  Has seen the urologist and they have recommended the above  surgery.  Patient has hypertension, controlled.  She has hyperlipidemia and is on medication.  She does have prediabetes and diet controlled.  She has not been diagnosed with heart attack, heart disease or stroke.    GERD, happening more frequently, every other week. Rolaids help some. Does feel like sometimes a little harder to swallow.  Would agree to a GI consult.          4/1/2024     6:46 PM   Preop Questions   1. Have you ever had a heart attack or stroke? No   2. Have you ever had surgery on your heart or blood vessels, such as a stent placement, a coronary artery bypass, or surgery on an artery in your head, neck, heart, or legs? No   3. Do you have chest pain with activity? No   4. Do you have a history of  heart failure? No   5. Do you currently have a cold, bronchitis or symptoms of other infection? No   6. Do you have a cough, shortness of breath, or wheezing? No   7. Do you or anyone in your family have previous history of blood clots? No   8. Do you or does anyone in your family have a serious bleeding problem such as prolonged bleeding following surgeries or cuts? No   9. Have you ever had problems with anemia or been told to take iron pills? No   10. Have you had any abnormal blood loss such as black, tarry or bloody stools, or abnormal vaginal bleeding? No    11. Have you ever had a blood transfusion? No   12. Are you willing to have a blood transfusion if it is medically needed before, during, or after your surgery? Yes   13. Have you or any of your relatives ever had problems with anesthesia? No   14. Do you have sleep apnea, excessive snoring or daytime drowsiness? No   15. Do you have any artifical heart valves or other implanted medical devices like a pacemaker, defibrillator, or continuous glucose monitor? No   16. Do you have artificial joints? No   17. Are you allergic to latex? No       Health Care Directive  Patient does not have a Health Care Directive or Living Will: Patient states has Advance Directive and will bring in a copy to clinic.    Preoperative Review of   NA      Status of Chronic Conditions:  See problem list for active medical problems.  Problems all longstanding and stable, except as noted/documented.  See ROS for pertinent symptoms related to these conditions.    Patient Active Problem List    Diagnosis Date Noted    Female stress incontinence 03/27/2024     Priority: Medium    Cervical cancer screening 02/01/2023     Priority: Medium     03/28/1997 Hankins listed in surgical hx but no results to review  08/11/15 NIL Pap  01/25/23 NIL Pap, Neg HR HPV Plan cotest in 3 years per provider.     Pt needs 3 consecutive negative pap tests or 2 consecutive negative cotests within 10 years with the last one being in the last 5 years, before pap screening can be discontinued.    No history of WEST 2 or greater found in epic.                  Unspecified mood (affective) disorder (H24) 01/25/2023     Priority: Medium    Hyperlipidemia LDL goal <130 01/25/2023     Priority: Medium    Essential hypertension 01/25/2023     Priority: Medium    Impaired fasting glucose      Priority: Medium     Created by Conversion       Vaginal prolapse      Priority: Medium    DDD (degenerative disc disease), lumbar      Priority: Medium    History of basal cell carcinoma  10/01/2018     Priority: Medium    Adenomatous polyp of colon, unspecified part of colon-Multiple adenomatous 10/19/2017     Priority: Medium    Osteopenia      Priority: Medium     Created by Conversion  Replacement Utility updated for latest IMO load      Formatting of this note might be different from the original.  Created by Conversion    Replacement Utility updated for latest IMO load      Osteoarthrosis      Priority: Medium     Created by Conversion  Replacement Utility updated for latest IMO load      Formatting of this note might be different from the original.  Created by Conversion    Replacement Utility updated for latest IMO load      Anxiety Disorder NOS      Priority: Medium     Created by Conversion  Replacement Utility updated for latest IMO load      Formatting of this note might be different from the original.  Created by Conversion    Replacement Utility updated for latest IMO load      Insomnia      Priority: Medium     Created by Conversion      Formatting of this note might be different from the original.  Created by Conversion        Past Medical History:   Diagnosis Date    Full incontinence of feces     Created by Conversion     Impaired fasting glucose     Created by Conversion      Past Surgical History:   Procedure Laterality Date    BLADDER SUSPENSION WITH SLING - TISSUE FIXATION SYSTEM      [id number]    HAND SURGERY Left about 2010    For 1st [facility] OA    HAND SURGERY Right 02/01/2019    For 1st [facility] OA and carpal tunnel    [place] COLPOSCOPY,CERVIX W/ADJ VAGINA,W/BX      Description: Colposcopy Cervix With Biopsy(S);  Proc Date: 03/28/1997;  Comments: mild dysplasia with koilocytosis, and endocervical polyp    ZZC LIGATE FALLOPIAN TUBE      Description: Tubal Ligation;  Recorded: 01/02/2009;     Current Outpatient Medications   Medication Sig Dispense Refill    Ascorbic Acid (VITAMIN C PO)       Azelastine HCl 137 MCG/SPRAY SOLN SPRAY 2 SPRAYS INTO EACH NOSTRIL EVERY DAY 30 mL 4    buPROPion  (WELLBUTRIN XL) 300 MG 24 hr tablet Take 1 tablet (300 mg) by mouth every morning 90 tablet 3    cholecalciferol, vitamin D3, 1,000 unit tablet [CHOLECALCIFEROL, VITAMIN D3, 1,000 UNIT TABLET] Take 1,000 Units by mouth daily.      famotidine (PEPCID) 20 MG tablet Take 1 tablet (20 mg) by mouth 2 times daily 180 tablet 3    Fiber POWD Take by mouth daily      ibuprofen (ADVIL/MOTRIN) 800 MG tablet TAKE 1 TABLET (800 MG) BY MOUTH EVERY 8 HOURS AS NEEDED 90 tablet 3    losartan (COZAAR) 50 MG tablet TAKE 1 TABLET BY MOUTH EVERY DAY 90 tablet 0    Multiple Vitamin (MULTIVITAMIN PO)       Niacin (VITAMIN B-3 OR)       NON FORMULARY [NON FORMULARY] Bone up- calcium      polyethylene glycol (MIRALAX) 17 g packet Take 1 packet by mouth daily      Probiotic Product (UP4 PROBIOTICS WOMENS) CAPS       rosuvastatin (CRESTOR) 5 MG tablet Take 1 tablet (5 mg) by mouth daily 90 tablet 3    vit A/vit C/vit E/zinc/copper (PRESERVISION AREDS ORAL)       Omega-3 Fatty Acids (FISH OIL PO) Take 1 capsule by mouth daily (Patient not taking: Reported on 3/27/2024)      Pyridoxine HCl (B-6) 100 MG TABS  (Patient not taking: Reported on 3/27/2024)         Allergies   Allergen Reactions    Codeine Unknown     Nausea    Lisinopril Unknown     Cough and lightheaded        Social History     Tobacco Use    Smoking status: Former     Types: Cigarettes     Quit date: 10/19/1987     Years since quittin.4    Smokeless tobacco: Never    Tobacco comments:     30 years ago   Substance Use Topics    Alcohol use: Yes     Alcohol/week: 3.0 standard drinks of alcohol     Family History   Problem Relation Age of Onset    Breast Cancer Mother 58    Hypothyroidism Mother     Osteoporosis Mother     Hypertension Mother     Depression Mother     Hyperlipidemia Mother     Hypertension Father     Heart Disease Father          age 56    Hypertension Sister     Hyperlipidemia Sister     Hypertension Brother     Hyperlipidemia Brother     Depression  "Maternal Grandfather     Diabetes Paternal Grandmother     Cerebrovascular Disease Paternal Aunt     Anuerysm Paternal Uncle     Ovarian Cancer No family hx of      History   Drug Use No         Review of Systems    Review of Systems  Constitutional, HEENT, cardiovascular, pulmonary, GI, , musculoskeletal, neuro, skin, endocrine and psych systems are negative, except as otherwise noted.    Objective    /83 (BP Location: Left arm, Patient Position: Sitting, Cuff Size: Adult Regular)   Pulse 77   Temp 97.9  F (36.6  C) (Oral)   Resp 16   Ht 1.635 m (5' 4.37\")   Wt 66.2 kg (146 lb)   SpO2 100%   BMI 24.77 kg/m     Estimated body mass index is 24.77 kg/m  as calculated from the following:    Height as of this encounter: 1.635 m (5' 4.37\").    Weight as of this encounter: 66.2 kg (146 lb).  Physical Exam  GENERAL: alert and no distress  EYES: Eyes grossly normal to inspection, PERRL and conjunctivae and sclerae normal  HENT: ear canals and TM's normal, nose and mouth without ulcers or lesions  NECK: no adenopathy, no asymmetry, masses, or scars  RESP: lungs clear to auscultation - no rales, rhonchi or wheezes  CV: regular rate and rhythm, normal S1 S2, no S3 or S4, no murmur, click or rub, no peripheral edema  ABDOMEN: soft, nontender, no hepatosplenomegaly, no masses and bowel sounds normal  MS: no gross musculoskeletal defects noted, no edema  SKIN: no suspicious lesions or rashes  NEURO: Normal strength and tone, mentation intact and speech normal  PSYCH: mentation appears normal, affect normal/bright    Recent Labs   Lab Test 01/29/24  0959 01/25/23  1107   HGB 14.6 13.5    233    141   POTASSIUM 4.6 4.4   CR 0.91 0.78   A1C 5.7* 5.7*        Diagnostics  Labs pending at this time.  Results will be reviewed when available.   EKG:  Sinus rhythm   Possible Left atrial enlargement   Low voltage QRS   Nonspecific T wave abnormality   Abnormal ECG   When compared with ECG of 10-AUG-2022 16:00, "   No significant change was found     Normal stress echo Aug. 2022    Revised Cardiac Risk Index (RCRI)  The patient has the following serious cardiovascular risks for perioperative complications:   - No serious cardiac risks = 0 points     RCRI Interpretation: 0 points: Class I (very low risk - 0.4% complication rate)         Signed Electronically by: Amarilis Delgado MD  Copy of this evaluation report is provided to requesting physician.

## 2024-04-09 LAB — POTASSIUM SERPL-SCNC: 4.7 MMOL/L (ref 3.4–5.3)

## 2024-04-12 ENCOUNTER — ANESTHESIA EVENT (OUTPATIENT)
Dept: SURGERY | Facility: AMBULATORY SURGERY CENTER | Age: 69
End: 2024-04-12
Payer: COMMERCIAL

## 2024-04-15 ENCOUNTER — HOSPITAL ENCOUNTER (OUTPATIENT)
Facility: AMBULATORY SURGERY CENTER | Age: 69
Discharge: HOME | End: 2024-04-15
Attending: UROLOGY | Admitting: UROLOGY
Payer: COMMERCIAL

## 2024-04-15 ENCOUNTER — ANESTHESIA (OUTPATIENT)
Dept: SURGERY | Facility: AMBULATORY SURGERY CENTER | Age: 69
End: 2024-04-15
Payer: COMMERCIAL

## 2024-04-15 VITALS
RESPIRATION RATE: 18 BRPM | SYSTOLIC BLOOD PRESSURE: 117 MMHG | HEART RATE: 67 BPM | BODY MASS INDEX: 24.6 KG/M2 | TEMPERATURE: 97.6 F | DIASTOLIC BLOOD PRESSURE: 67 MMHG | WEIGHT: 145 LBS | OXYGEN SATURATION: 100 %

## 2024-04-15 DIAGNOSIS — N39.3 FEMALE STRESS INCONTINENCE: Primary | ICD-10-CM

## 2024-04-15 PROCEDURE — 57288 REPAIR BLADDER DEFECT: CPT

## 2024-04-15 PROCEDURE — G8907 PT DOC NO EVENTS ON DISCHARG: HCPCS

## 2024-04-15 PROCEDURE — G8916 PT W IV AB GIVEN ON TIME: HCPCS

## 2024-04-15 PROCEDURE — 57287 REVISE/REMOVE SLING REPAIR: CPT | Performed by: REGISTERED NURSE

## 2024-04-15 PROCEDURE — 57287 REVISE/REMOVE SLING REPAIR: CPT | Performed by: ANESTHESIOLOGY

## 2024-04-15 PROCEDURE — 99100 ANES PT EXTEME AGE<1 YR&>70: CPT | Performed by: ANESTHESIOLOGY

## 2024-04-15 PROCEDURE — C1771 REP DEV, URINARY, W/SLING: HCPCS

## 2024-04-15 PROCEDURE — 99100 ANES PT EXTEME AGE<1 YR&>70: CPT | Performed by: REGISTERED NURSE

## 2024-04-15 DEVICE — MESH SLING SINGLE INCISION ALTIS 519650: Type: IMPLANTABLE DEVICE | Site: VAGINA | Status: FUNCTIONAL

## 2024-04-15 RX ORDER — HYDROCODONE BITARTRATE AND ACETAMINOPHEN 5; 325 MG/1; MG/1
1 TABLET ORAL ONCE
Status: DISCONTINUED | OUTPATIENT
Start: 2024-04-15 | End: 2024-04-16 | Stop reason: HOSPADM

## 2024-04-15 RX ORDER — FENTANYL CITRATE 50 UG/ML
25 INJECTION, SOLUTION INTRAMUSCULAR; INTRAVENOUS
Status: DISCONTINUED | OUTPATIENT
Start: 2024-04-15 | End: 2024-04-16 | Stop reason: HOSPADM

## 2024-04-15 RX ORDER — ACETAMINOPHEN 325 MG/1
975 TABLET ORAL ONCE
Status: COMPLETED | OUTPATIENT
Start: 2024-04-15 | End: 2024-04-15

## 2024-04-15 RX ORDER — LIDOCAINE HYDROCHLORIDE 20 MG/ML
INJECTION, SOLUTION INFILTRATION; PERINEURAL PRN
Status: DISCONTINUED | OUTPATIENT
Start: 2024-04-15 | End: 2024-04-15

## 2024-04-15 RX ORDER — FENTANYL CITRATE 50 UG/ML
25 INJECTION, SOLUTION INTRAMUSCULAR; INTRAVENOUS EVERY 5 MIN PRN
Status: DISCONTINUED | OUTPATIENT
Start: 2024-04-15 | End: 2024-04-16 | Stop reason: HOSPADM

## 2024-04-15 RX ORDER — OXYCODONE HYDROCHLORIDE 5 MG/1
5 TABLET ORAL EVERY 4 HOURS PRN
Status: DISCONTINUED | OUTPATIENT
Start: 2024-04-15 | End: 2024-04-16 | Stop reason: HOSPADM

## 2024-04-15 RX ORDER — METOPROLOL TARTRATE 1 MG/ML
1-2 INJECTION, SOLUTION INTRAVENOUS EVERY 5 MIN PRN
Status: DISCONTINUED | OUTPATIENT
Start: 2024-04-15 | End: 2024-04-16 | Stop reason: HOSPADM

## 2024-04-15 RX ORDER — SODIUM CHLORIDE, SODIUM LACTATE, POTASSIUM CHLORIDE, CALCIUM CHLORIDE 600; 310; 30; 20 MG/100ML; MG/100ML; MG/100ML; MG/100ML
INJECTION, SOLUTION INTRAVENOUS CONTINUOUS
Status: DISCONTINUED | OUTPATIENT
Start: 2024-04-15 | End: 2024-04-16 | Stop reason: HOSPADM

## 2024-04-15 RX ORDER — OXYCODONE HYDROCHLORIDE 5 MG/1
10 TABLET ORAL EVERY 4 HOURS PRN
Status: DISCONTINUED | OUTPATIENT
Start: 2024-04-15 | End: 2024-04-16 | Stop reason: HOSPADM

## 2024-04-15 RX ORDER — PROPOFOL 10 MG/ML
INJECTION, EMULSION INTRAVENOUS PRN
Status: DISCONTINUED | OUTPATIENT
Start: 2024-04-15 | End: 2024-04-15

## 2024-04-15 RX ORDER — NALOXONE HYDROCHLORIDE 0.4 MG/ML
0.1 INJECTION, SOLUTION INTRAMUSCULAR; INTRAVENOUS; SUBCUTANEOUS
Status: DISCONTINUED | OUTPATIENT
Start: 2024-04-15 | End: 2024-04-16 | Stop reason: HOSPADM

## 2024-04-15 RX ORDER — CEFAZOLIN SODIUM 2 G/50ML
2 SOLUTION INTRAVENOUS SEE ADMIN INSTRUCTIONS
Status: DISCONTINUED | OUTPATIENT
Start: 2024-04-15 | End: 2024-04-16 | Stop reason: HOSPADM

## 2024-04-15 RX ORDER — OXYCODONE HYDROCHLORIDE 5 MG/1
5-10 TABLET ORAL EVERY 4 HOURS PRN
Qty: 6 TABLET | Refills: 0 | Status: SHIPPED | OUTPATIENT
Start: 2024-04-15

## 2024-04-15 RX ORDER — ONDANSETRON 2 MG/ML
INJECTION INTRAMUSCULAR; INTRAVENOUS PRN
Status: DISCONTINUED | OUTPATIENT
Start: 2024-04-15 | End: 2024-04-15

## 2024-04-15 RX ORDER — ONDANSETRON 2 MG/ML
4 INJECTION INTRAMUSCULAR; INTRAVENOUS EVERY 30 MIN PRN
Status: DISCONTINUED | OUTPATIENT
Start: 2024-04-15 | End: 2024-04-16 | Stop reason: HOSPADM

## 2024-04-15 RX ORDER — LIDOCAINE 40 MG/G
CREAM TOPICAL
Status: DISCONTINUED | OUTPATIENT
Start: 2024-04-15 | End: 2024-04-16 | Stop reason: HOSPADM

## 2024-04-15 RX ORDER — ONDANSETRON 4 MG/1
4 TABLET, ORALLY DISINTEGRATING ORAL EVERY 30 MIN PRN
Status: DISCONTINUED | OUTPATIENT
Start: 2024-04-15 | End: 2024-04-16 | Stop reason: HOSPADM

## 2024-04-15 RX ORDER — FENTANYL CITRATE 50 UG/ML
INJECTION, SOLUTION INTRAMUSCULAR; INTRAVENOUS PRN
Status: DISCONTINUED | OUTPATIENT
Start: 2024-04-15 | End: 2024-04-15

## 2024-04-15 RX ORDER — FENTANYL CITRATE 50 UG/ML
50 INJECTION, SOLUTION INTRAMUSCULAR; INTRAVENOUS EVERY 5 MIN PRN
Status: DISCONTINUED | OUTPATIENT
Start: 2024-04-15 | End: 2024-04-16 | Stop reason: HOSPADM

## 2024-04-15 RX ORDER — CEFAZOLIN SODIUM 2 G/50ML
2 SOLUTION INTRAVENOUS
Status: COMPLETED | OUTPATIENT
Start: 2024-04-15 | End: 2024-04-15

## 2024-04-15 RX ORDER — PROPOFOL 10 MG/ML
INJECTION, EMULSION INTRAVENOUS CONTINUOUS PRN
Status: DISCONTINUED | OUTPATIENT
Start: 2024-04-15 | End: 2024-04-15

## 2024-04-15 RX ORDER — HYDRALAZINE HYDROCHLORIDE 20 MG/ML
2.5-5 INJECTION INTRAMUSCULAR; INTRAVENOUS EVERY 10 MIN PRN
Status: DISCONTINUED | OUTPATIENT
Start: 2024-04-15 | End: 2024-04-16 | Stop reason: HOSPADM

## 2024-04-15 RX ADMIN — CEFAZOLIN SODIUM 2 G: 2 SOLUTION INTRAVENOUS at 13:08

## 2024-04-15 RX ADMIN — PROPOFOL 30 MG: 10 INJECTION, EMULSION INTRAVENOUS at 13:12

## 2024-04-15 RX ADMIN — SODIUM CHLORIDE, SODIUM LACTATE, POTASSIUM CHLORIDE, CALCIUM CHLORIDE: 600; 310; 30; 20 INJECTION, SOLUTION INTRAVENOUS at 12:36

## 2024-04-15 RX ADMIN — PROPOFOL 100 MCG/KG/MIN: 10 INJECTION, EMULSION INTRAVENOUS at 13:12

## 2024-04-15 RX ADMIN — ACETAMINOPHEN 975 MG: 325 TABLET ORAL at 12:36

## 2024-04-15 RX ADMIN — FENTANYL CITRATE 50 MCG: 50 INJECTION, SOLUTION INTRAMUSCULAR; INTRAVENOUS at 13:12

## 2024-04-15 RX ADMIN — OXYCODONE HYDROCHLORIDE 5 MG: 5 TABLET ORAL at 14:59

## 2024-04-15 RX ADMIN — LIDOCAINE HYDROCHLORIDE 40 MG: 20 INJECTION, SOLUTION INFILTRATION; PERINEURAL at 13:12

## 2024-04-15 RX ADMIN — FENTANYL CITRATE 25 MCG: 50 INJECTION, SOLUTION INTRAMUSCULAR; INTRAVENOUS at 13:34

## 2024-04-15 RX ADMIN — FENTANYL CITRATE 25 MCG: 50 INJECTION, SOLUTION INTRAMUSCULAR; INTRAVENOUS at 13:22

## 2024-04-15 RX ADMIN — ONDANSETRON 4 MG: 2 INJECTION INTRAMUSCULAR; INTRAVENOUS at 13:22

## 2024-04-15 NOTE — ANESTHESIA POSTPROCEDURE EVALUATION
Patient: Calin Aguilar    Procedure: Procedure(s):  Pubovaginal sling with Altis       Anesthesia Type:  MAC    Note:  Disposition: Outpatient   Postop Pain Control: Uneventful            Sign Out: Well controlled pain   PONV: No   Neuro/Psych: Uneventful            Sign Out: Acceptable/Baseline neuro status   Airway/Respiratory: Uneventful            Sign Out: Acceptable/Baseline resp. status   CV/Hemodynamics: Uneventful            Sign Out: Acceptable CV status; No obvious hypovolemia; No obvious fluid overload   Other NRE: NONE   DID A NON-ROUTINE EVENT OCCUR?            Last vitals:  Vitals Value Taken Time   /71 04/15/24 1346   Temp 97.6  F (36.4  C) 04/15/24 1346   Pulse     Resp 16 04/15/24 1346   SpO2 99 % 04/15/24 1346       Electronically Signed By: Justen Lopez MD  April 15, 2024  2:15 PM

## 2024-04-15 NOTE — OP NOTE
Pubovaginal sling (Altis), redo  MAC, Green  EBL: 5  Drain: none  Comple: none        HISTORY:  68 yo with prior sling about 30 yrs ago, now with mixed UI, stress = urge, moderate hypermobility with grade 2 anterior defect, cysto capacity of 500 ml with moderate hypermobility and marked leak on Valsalva.      PROCEDURE:  The patient was brought to the operating room and placed in the supine position. All extremities were padded and protected. General anesthesia was administered without incident. Prophylactic antibiotics were administered. Pneumatic stockings were placed. She was then carefully positioned in the low lithotomy position and a sterile prep and drape were performed. Surgical time out was performed per protocol.    The vagina was prepared for surgery with the 20 Fr tee catheter and Tea retractor. Laxity of support at the bladder neck and urethra were again appreciated. A marking pen was used to delineate the line of proposed incision over the mid urethra. The subepithelial tissue was infiltrated with sterile saline. This was more difficult due to scarring from the previous procedure. The #15 blade was used to make a longitudinal incision. A combination of sharp and blunt dissection was carried back to the ventral portion of the anterior rami bilaterally. This was considerably more challenging due to scarring from the prior procedure. This was directed at approximately 2 and 10 o'clock.    At this point the Altis sling was carefully loaded on to the right side passing instrument. The static anchor was then passed into the right obturator membrane at approximately the 10 o'clock position in an external rotational manner. The introducer was carefully withdrawn in a reverse helical fashion. Attention was performed to ensure adequate anchoring. The left side instrument was then carefully secured into the patients's left obturator membrane at approximately 2 o'clock, again using external rotation.  At this point, tensioning was performed to ensure that the Altis sling lay appropriately on the urethra with appropriate tension. Once this was ascertained, the redundant portion of the tensioning suture was transected and removed. Copious antibiotic irrigation was used. Examination by direct vision and palpation revealed no abnormality in the vaginal fornices. Meticulous hemostasis was documented. Closure was performed with a running locked stitch of 2-0 Vicryl. A vaginal packing soaked in antibiotic solution was placed. The Handy catheter and retractor were withdrawn.    Cystoscopy was performed under real time video control with the 70-decree optic. There was no evidence of mesh or suture in the lower urinary tract. The bladder was drained and the scope was withdrawn. The patient tolerated the procedure in a satisfactory manner and was brought to the recovery room in stable condition. There were no operative complications.    Balaji Green MD

## 2024-04-15 NOTE — DISCHARGE INSTRUCTIONS
Tylenol 975 mg was given at 1230 pm.    You should not take more then 4,000 mg of tylenol/acetaminophen in a 24 hour period.

## 2024-04-15 NOTE — ANESTHESIA PREPROCEDURE EVALUATION
Anesthesia Pre-Procedure Evaluation    Patient: Calin Aguilar   MRN: [id number] : 1955        Procedure : Procedure(s):  Pubovaginal sling with Altis          Past Medical History:   Diagnosis Date    Full incontinence of feces     Created by Conversion     Impaired fasting glucose     Created by Conversion       Past Surgical History:   Procedure Laterality Date    BLADDER SUSPENSION WITH SLING - TISSUE FIXATION SYSTEM      [id number]    HAND SURGERY Left about     For 1st [facility] OA    HAND SURGERY Right 2019    For 1st [facility] OA and carpal tunnel    [place] COLPOSCOPY,CERVIX W/ADJ VAGINA,W/BX      Description: Colposcopy Cervix With Biopsy(S);  Proc Date: 1997;  Comments: mild dysplasia with koilocytosis, and endocervical polyp    ZZC LIGATE FALLOPIAN TUBE      Description: Tubal Ligation;  Recorded: 2009;      Allergies   Allergen Reactions    Codeine Unknown     Nausea    Lisinopril Unknown     Cough and lightheaded      Social History     Tobacco Use    Smoking status: Former     Current packs/day: 0.00     Types: Cigarettes     Quit date: 10/19/1987     Years since quittin.5    Smokeless tobacco: Never    Tobacco comments:     30 years ago   Substance Use Topics    Alcohol use: Yes     Alcohol/week: 3.0 standard drinks of alcohol      Wt Readings from Last 1 Encounters:   24 66.2 kg (146 lb)        Anesthesia Evaluation            ROS/MED HX  ENT/Pulmonary:       Neurologic:       Cardiovascular:     (+)  hypertension- -   -  - -                                      METS/Exercise Tolerance:     Hematologic:       Musculoskeletal:       GI/Hepatic:       Renal/Genitourinary:       Endo:       Psychiatric/Substance Use:       Infectious Disease:       Malignancy:       Other:            Physical Exam    Airway        Mallampati: II   TM distance: > 3 FB   Neck ROM: full     Respiratory Devices and Support         Dental       (+) Modest Abnormalities - crowns, retainers, 1 or 2 missing  "teeth      Cardiovascular          Rhythm and rate: regular     Pulmonary           breath sounds clear to auscultation           OUTSIDE LABS:  CBC:   Lab Results   Component Value Date    WBC 5.8 01/29/2024    WBC 5.2 01/25/2023    HGB 14.2 04/08/2024    HGB 14.6 01/29/2024    HCT 43.8 01/29/2024    HCT 40.5 01/25/2023     01/29/2024     01/25/2023     BMP:   Lab Results   Component Value Date     01/29/2024     01/25/2023    POTASSIUM 4.7 04/08/2024    POTASSIUM 4.6 01/29/2024    CHLORIDE 104 01/29/2024    CHLORIDE 105 01/25/2023    CO2 25 01/29/2024    CO2 24 01/25/2023    BUN 25.4 (H) 01/29/2024    BUN 19.0 01/25/2023    CR 0.91 01/29/2024    CR 0.78 01/25/2023    GLC 93 01/29/2024    GLC 90 01/25/2023     COAGS: No results found for: \"PTT\", \"INR\", \"FIBR\"  POC: No results found for: \"BGM\", \"HCG\", \"HCGS\"  HEPATIC:   Lab Results   Component Value Date    ALBUMIN 4.7 01/29/2024    PROTTOTAL 7.2 01/29/2024    ALT 20 01/29/2024    AST 22 01/29/2024    ALKPHOS 68 01/29/2024    BILITOTAL 0.4 01/29/2024     OTHER:   Lab Results   Component Value Date    A1C 5.7 (H) 01/29/2024    SHELBI 9.8 01/29/2024    MAG 2.1 04/11/2022    TSH 2.78 01/29/2024    SED 8 08/10/2022       Anesthesia Plan    ASA Status:  2       Anesthesia Type: MAC.     - Reason for MAC: immobility needed              Consents    Anesthesia Plan(s) and associated risks, benefits, and realistic alternatives discussed. Questions answered and patient/representative(s) expressed understanding.     - Discussed:     - Discussed with:  Patient            Postoperative Care    Pain management: Multi-modal analgesia.   PONV prophylaxis: Ondansetron (or other 5HT-3), Background Propofol Infusion     Comments:               Justen Lopez MD    I have reviewed the pertinent notes and labs in the chart from the past 30 days and (re)examined the patient.  Any updates or changes from those notes are reflected in this note.                  "

## 2024-04-15 NOTE — ANESTHESIA CARE TRANSFER NOTE
Patient: Calin Aguilar    Procedure: Procedure(s):  Pubovaginal sling with Altis       Diagnosis: ALEJANDRO (stress urinary incontinence, female) [N39.3]  Diagnosis Additional Information: No value filed.    Anesthesia Type:   MAC     Note:    Oropharynx: oropharynx clear of all foreign objects and spontaneously breathing  Level of Consciousness: awake and drowsy  Oxygen Supplementation: room air    Independent Airway: airway patency satisfactory and stable  Dentition: dentition unchanged  Vital Signs Stable: post-procedure vital signs reviewed and stable  Report to RN Given: handoff report given  Patient transferred to: PACU    Handoff Report: Identifed the Patient, Identified the Reponsible Provider, Reviewed the pertinent medical history, Discussed the surgical course, Reviewed Intra-OP anesthesia mangement and issues during anesthesia, Set expectations for post-procedure period and Allowed opportunity for questions and acknowledgement of understanding    Vitals:  Vitals Value Taken Time   /71 04/15/24 1346   Temp 97.6  F (36.4  C) 04/15/24 1346   Pulse     Resp 16 04/15/24 1346   SpO2 99 % 04/15/24 1346       Electronically Signed By: DIALLO Welsh CRNA  April 15, 2024  1:49 PM

## 2024-05-02 ENCOUNTER — TRANSFERRED RECORDS (OUTPATIENT)
Dept: HEALTH INFORMATION MANAGEMENT | Facility: CLINIC | Age: 69
End: 2024-05-02
Payer: COMMERCIAL

## 2024-05-10 ENCOUNTER — TRANSFERRED RECORDS (OUTPATIENT)
Dept: HEALTH INFORMATION MANAGEMENT | Facility: CLINIC | Age: 69
End: 2024-05-10
Payer: COMMERCIAL

## 2024-05-15 ENCOUNTER — OFFICE VISIT (OUTPATIENT)
Dept: UROLOGY | Facility: CLINIC | Age: 69
End: 2024-05-15
Payer: COMMERCIAL

## 2024-05-15 VITALS
BODY MASS INDEX: 24.6 KG/M2 | DIASTOLIC BLOOD PRESSURE: 77 MMHG | HEART RATE: 80 BPM | SYSTOLIC BLOOD PRESSURE: 116 MMHG | OXYGEN SATURATION: 99 % | WEIGHT: 145 LBS

## 2024-05-15 DIAGNOSIS — N39.3 SUI (STRESS URINARY INCONTINENCE, FEMALE): Primary | ICD-10-CM

## 2024-05-15 LAB
ALBUMIN UR-MCNC: NEGATIVE MG/DL
APPEARANCE UR: CLEAR
BACTERIA #/AREA URNS HPF: ABNORMAL /HPF
BILIRUB UR QL STRIP: NEGATIVE
COLOR UR AUTO: YELLOW
GLUCOSE UR STRIP-MCNC: NEGATIVE MG/DL
HGB UR QL STRIP: NEGATIVE
KETONES UR STRIP-MCNC: NEGATIVE MG/DL
LEUKOCYTE ESTERASE UR QL STRIP: ABNORMAL
NITRATE UR QL: NEGATIVE
PH UR STRIP: 6.5 [PH] (ref 5–7)
RBC #/AREA URNS AUTO: ABNORMAL /HPF
SP GR UR STRIP: 1.02 (ref 1–1.03)
SQUAMOUS #/AREA URNS AUTO: ABNORMAL /LPF
TRANS CELLS #/AREA URNS HPF: ABNORMAL /HPF
UROBILINOGEN UR STRIP-ACNC: 0.2 E.U./DL
WBC #/AREA URNS AUTO: ABNORMAL /HPF

## 2024-05-15 PROCEDURE — 99024 POSTOP FOLLOW-UP VISIT: CPT | Performed by: UROLOGY

## 2024-05-15 PROCEDURE — 81001 URINALYSIS AUTO W/SCOPE: CPT | Performed by: UROLOGY

## 2024-05-16 ENCOUNTER — MYC MEDICAL ADVICE (OUTPATIENT)
Dept: UROLOGY | Facility: CLINIC | Age: 69
End: 2024-05-16
Payer: COMMERCIAL

## 2024-06-17 ENCOUNTER — DOCUMENTATION ONLY (OUTPATIENT)
Dept: FAMILY MEDICINE | Facility: CLINIC | Age: 69
End: 2024-06-17
Payer: COMMERCIAL

## 2024-06-17 DIAGNOSIS — E78.5 HYPERLIPIDEMIA LDL GOAL <130: Primary | ICD-10-CM

## 2024-06-17 DIAGNOSIS — I10 ESSENTIAL HYPERTENSION: ICD-10-CM

## 2024-06-17 RX ORDER — LOSARTAN POTASSIUM 50 MG/1
50 TABLET ORAL DAILY
Qty: 90 TABLET | Refills: 2 | Status: SHIPPED | OUTPATIENT
Start: 2024-06-17

## 2024-06-17 NOTE — PROGRESS NOTES
"Calin Aguilar has an upcoming lab appointment:    Future Appointments   Date Time Provider Department Center   6/19/2024  9:30 AM [id number] [facility]   6/25/2024  9:00 AM hospitals LAB [facility]   1/30/2025  3:40 PM Amarilis Delgado MD FMOB [facility]   2/17/2025  9:45 AM [id number] MDBRCR [facility]     Patient is scheduled for the following lab(s): Cholesterol recheck per Dr. Delgado:     \"Please let me know if you are agreeable to start a cholesterol medication and I will send one. Then set a fasting lab appointment in about 6 months.\"    There is no order available. Please review and place either future orders or HMPO (Review of Health Maintenance Protocol Orders), as appropriate.    There are no preventive care reminders to display for this patient.    Selena Chauhan    "

## 2024-06-19 ENCOUNTER — HOSPITAL ENCOUNTER (OUTPATIENT)
Dept: BONE DENSITY | Facility: HOSPITAL | Age: 69
Discharge: HOME | End: 2024-06-19
Attending: FAMILY MEDICINE | Admitting: FAMILY MEDICINE
Payer: COMMERCIAL

## 2024-06-19 DIAGNOSIS — Z78.0 MENOPAUSE: ICD-10-CM

## 2024-06-19 PROCEDURE — 77089 TBS DXA CAL W/I&R FX RISK: CPT

## 2024-06-19 PROCEDURE — 77080 DXA BONE DENSITY AXIAL: CPT

## 2024-06-24 ENCOUNTER — TRANSFERRED RECORDS (OUTPATIENT)
Dept: HEALTH INFORMATION MANAGEMENT | Facility: CLINIC | Age: 69
End: 2024-06-24
Payer: COMMERCIAL

## 2024-07-03 ENCOUNTER — LAB (OUTPATIENT)
Dept: LAB | Facility: CLINIC | Age: 69
End: 2024-07-03
Payer: COMMERCIAL

## 2024-07-03 DIAGNOSIS — E78.5 HYPERLIPIDEMIA LDL GOAL <130: ICD-10-CM

## 2024-07-03 LAB
CHOLEST SERPL-MCNC: 154 MG/DL
FASTING STATUS PATIENT QL REPORTED: YES
HDLC SERPL-MCNC: 68 MG/DL
LDLC SERPL CALC-MCNC: 74 MG/DL
NONHDLC SERPL-MCNC: 86 MG/DL
TRIGL SERPL-MCNC: 59 MG/DL

## 2024-07-03 PROCEDURE — 80061 LIPID PANEL: CPT

## 2024-07-03 PROCEDURE — 36415 COLL VENOUS BLD VENIPUNCTURE: CPT

## 2024-08-01 DIAGNOSIS — M19.90 OSTEOARTHRITIS, UNSPECIFIED OSTEOARTHRITIS TYPE, UNSPECIFIED SITE: ICD-10-CM

## 2024-08-01 RX ORDER — IBUPROFEN 800 MG/1
800 TABLET, FILM COATED ORAL EVERY 8 HOURS PRN
Qty: 90 TABLET | Refills: 3 | Status: SHIPPED | OUTPATIENT
Start: 2024-08-01

## 2024-08-13 ENCOUNTER — TRANSFERRED RECORDS (OUTPATIENT)
Dept: HEALTH INFORMATION MANAGEMENT | Facility: CLINIC | Age: 69
End: 2024-08-13
Payer: COMMERCIAL

## 2024-10-08 ENCOUNTER — TRANSFERRED RECORDS (OUTPATIENT)
Dept: HEALTH INFORMATION MANAGEMENT | Facility: CLINIC | Age: 69
End: 2024-10-08
Payer: COMMERCIAL

## 2024-11-02 DIAGNOSIS — R45.86 MOOD CHANGE: ICD-10-CM

## 2024-11-04 RX ORDER — BUPROPION HYDROCHLORIDE 300 MG/1
300 TABLET ORAL EVERY MORNING
Qty: 90 TABLET | Refills: 1 | Status: SHIPPED | OUTPATIENT
Start: 2024-11-04

## 2025-01-21 DIAGNOSIS — E78.5 HYPERLIPIDEMIA LDL GOAL <130: ICD-10-CM

## 2025-01-21 RX ORDER — ROSUVASTATIN CALCIUM 5 MG/1
5 TABLET, COATED ORAL DAILY
Qty: 90 TABLET | Refills: 0 | Status: SHIPPED | OUTPATIENT
Start: 2025-01-21

## 2025-01-26 SDOH — HEALTH STABILITY: PHYSICAL HEALTH: ON AVERAGE, HOW MANY DAYS PER WEEK DO YOU ENGAGE IN MODERATE TO STRENUOUS EXERCISE (LIKE A BRISK WALK)?: 5 DAYS

## 2025-01-26 SDOH — HEALTH STABILITY: PHYSICAL HEALTH: ON AVERAGE, HOW MANY MINUTES DO YOU ENGAGE IN EXERCISE AT THIS LEVEL?: 130 MIN

## 2025-01-26 ASSESSMENT — SOCIAL DETERMINANTS OF HEALTH (SDOH): HOW OFTEN DO YOU GET TOGETHER WITH FRIENDS OR RELATIVES?: TWICE A WEEK

## 2025-01-30 ENCOUNTER — OFFICE VISIT (OUTPATIENT)
Dept: FAMILY MEDICINE | Facility: CLINIC | Age: 70
End: 2025-01-30
Payer: COMMERCIAL

## 2025-01-30 VITALS
HEART RATE: 86 BPM | DIASTOLIC BLOOD PRESSURE: 82 MMHG | TEMPERATURE: 97.8 F | HEIGHT: 64 IN | SYSTOLIC BLOOD PRESSURE: 124 MMHG | BODY MASS INDEX: 25.06 KG/M2 | OXYGEN SATURATION: 97 % | RESPIRATION RATE: 16 BRPM | WEIGHT: 146.8 LBS

## 2025-01-30 DIAGNOSIS — R73.01 IMPAIRED FASTING GLUCOSE: ICD-10-CM

## 2025-01-30 DIAGNOSIS — E78.5 HYPERLIPIDEMIA LDL GOAL <130: ICD-10-CM

## 2025-01-30 DIAGNOSIS — Z00.00 ENCOUNTER FOR ANNUAL WELLNESS EXAM IN MEDICARE PATIENT: Primary | ICD-10-CM

## 2025-01-30 DIAGNOSIS — Z85.828 HISTORY OF BASAL CELL CARCINOMA: ICD-10-CM

## 2025-01-30 DIAGNOSIS — I10 ESSENTIAL HYPERTENSION: ICD-10-CM

## 2025-01-30 DIAGNOSIS — E55.9 VITAMIN D DEFICIENCY: ICD-10-CM

## 2025-01-30 DIAGNOSIS — M25.552 HIP PAIN, LEFT: ICD-10-CM

## 2025-01-30 DIAGNOSIS — M19.011 OSTEOARTHRITIS OF BOTH SHOULDERS, UNSPECIFIED OSTEOARTHRITIS TYPE: ICD-10-CM

## 2025-01-30 DIAGNOSIS — M19.012 OSTEOARTHRITIS OF BOTH SHOULDERS, UNSPECIFIED OSTEOARTHRITIS TYPE: ICD-10-CM

## 2025-01-30 LAB
ALBUMIN SERPL BCG-MCNC: 4.5 G/DL (ref 3.5–5.2)
ALP SERPL-CCNC: 74 U/L (ref 40–150)
ALT SERPL W P-5'-P-CCNC: 35 U/L (ref 0–50)
ANION GAP SERPL CALCULATED.3IONS-SCNC: 10 MMOL/L (ref 7–15)
AST SERPL W P-5'-P-CCNC: 30 U/L (ref 0–45)
BILIRUB SERPL-MCNC: 0.5 MG/DL
BUN SERPL-MCNC: 22.7 MG/DL (ref 8–23)
CALCIUM SERPL-MCNC: 10.4 MG/DL (ref 8.8–10.4)
CHLORIDE SERPL-SCNC: 103 MMOL/L (ref 98–107)
CHOLEST SERPL-MCNC: 155 MG/DL
CREAT SERPL-MCNC: 0.86 MG/DL (ref 0.51–0.95)
EGFRCR SERPLBLD CKD-EPI 2021: 72 ML/MIN/1.73M2
ERYTHROCYTE [DISTWIDTH] IN BLOOD BY AUTOMATED COUNT: 12.1 % (ref 10–15)
EST. AVERAGE GLUCOSE BLD GHB EST-MCNC: 120 MG/DL
FASTING STATUS PATIENT QL REPORTED: YES
FASTING STATUS PATIENT QL REPORTED: YES
GLUCOSE SERPL-MCNC: 91 MG/DL (ref 70–99)
HBA1C MFR BLD: 5.8 % (ref 0–5.6)
HCO3 SERPL-SCNC: 28 MMOL/L (ref 22–29)
HCT VFR BLD AUTO: 41.4 % (ref 35–47)
HDLC SERPL-MCNC: 74 MG/DL
HGB BLD-MCNC: 13.9 G/DL (ref 11.7–15.7)
LDLC SERPL CALC-MCNC: 70 MG/DL
MCH RBC QN AUTO: 30.5 PG (ref 26.5–33)
MCHC RBC AUTO-ENTMCNC: 33.6 G/DL (ref 31.5–36.5)
MCV RBC AUTO: 91 FL (ref 78–100)
NONHDLC SERPL-MCNC: 81 MG/DL
PLATELET # BLD AUTO: 241 10E3/UL (ref 150–450)
POTASSIUM SERPL-SCNC: 4.7 MMOL/L (ref 3.4–5.3)
PROT SERPL-MCNC: 7.2 G/DL (ref 6.4–8.3)
RBC # BLD AUTO: 4.55 10E6/UL (ref 3.8–5.2)
SODIUM SERPL-SCNC: 141 MMOL/L (ref 135–145)
TRIGL SERPL-MCNC: 56 MG/DL
TSH SERPL DL<=0.005 MIU/L-ACNC: 2.86 UIU/ML (ref 0.3–4.2)
VIT D+METAB SERPL-MCNC: 52 NG/ML (ref 20–50)
WBC # BLD AUTO: 7.4 10E3/UL (ref 4–11)

## 2025-01-30 NOTE — PATIENT INSTRUCTIONS
If you do not mind bringing us a copy of your living will.    You can drop the form by for Dr. Delgado to sign stating you had your blood pressure checked.    I am glad you see Dermatology for full body skin checks.    See [facility] for shoulder and hand.    Today we will get an x-ray of your left hip.  Radiology will read the x-ray and we will send you a [employer] message.    Could see ortho for you hip pain that radiates to you foot.  Can ask them if it is ok to have a left hip bursa injection while having you other steroid injections.    If Ortho has cleared you for another steroid injection Dr. Delgado can do a hip bursa injection.    You can do your left hip and leg physical therapy as you have information at home and if you wish to see a formal physical therapist let me know.    For pain I rather have you use Tylenol which is acetaminophen versus ibuprofen.  Can take acetaminophen 500 to 1000 mg up to 3 times a day or max of 3000 mg in a day.      Health Maintenance   Topic Date Due    COVID-19 Vaccine (5 - 2024-25 season) Declined    BMP  Today    ANNUAL REVIEW OF HM ORDERS  Today    MEDICARE ANNUAL WELLNESS VISIT  Today    MAMMO SCREENING  Scheduled    LIPID  Today    PAP FOLLOW-UP  Next year    HPV FOLLOW-UP  Next year    FALL RISK ASSESSMENT  Today    DEXA  06/19/2026    GLUCOSE  Today    COLORECTAL CANCER SCREENING  01/19/2028    ADVANCE CARE PLANNING  Will bring copy to clinic    DTAP/TDAP/TD IMMUNIZATION (4 - Td or Tdap) 10/10/2029    PHQ-2 (once per calendar year)  Completed    INFLUENZA VACCINE  Completed    Pneumococcal Vaccine: 50+ Years  Completed    ZOSTER IMMUNIZATION  Completed    RSV VACCINE  Completed    HPV IMMUNIZATION  Aged Out    MENINGITIS IMMUNIZATION  Aged Out    RSV MONOCLONAL ANTIBODY  Aged Out    HEPATITIS C SCREENING  Discontinued

## 2025-01-30 NOTE — PROGRESS NOTES
Preventive Care Visit  [facility]  Amarilis Delgado MD, Family Medicine  Jan 30, 2025      Assessment & Plan       ICD-10-CM    1. Encounter for annual wellness exam in Medicare patient  Z00.00       2. Essential hypertension  I10 Comprehensive metabolic panel (BMP + Alb, Alk Phos, ALT, AST, Total. Bili, TP)     CBC with platelets     TSH with free T4 reflex     Comprehensive metabolic panel (BMP + Alb, Alk Phos, ALT, AST, Total. Bili, TP)     CBC with platelets     TSH with free T4 reflex      3. Hyperlipidemia LDL goal <130  E78.5 Lipid panel reflex to direct LDL Fasting     Lipid panel reflex to direct LDL Fasting      4. Impaired fasting glucose  R73.01 Hemoglobin A1c     Hemoglobin A1c      5. History of basal cell carcinoma  Z85.828       6. Osteoarthritis of both shoulders, unspecified osteoarthritis type  M19.011     M19.012       7. Vitamin D deficiency  E55.9 Vitamin D Deficiency     Vitamin D Deficiency      8. Hip pain, left  M25.552 XR Hip Left 2-3 Views        If you do not mind bringing us a copy of your living will.    You can drop the form by for Dr. Delgado to sign stating you had your blood pressure checked.    I am glad you see Dermatology for full body skin checks.    See [facility] for shoulder and hand.    Today we will get an x-ray of your left hip.  Radiology will read the x-ray and we will send you a [employer] message.    Could see ortho for you hip pain that radiates to you foot.  Can ask them if it is ok to have a left hip bursa injection while having you other steroid injections.    If Ortho has cleared you for another steroid injection Dr. Delgado can do a hip bursa injection.    You can do your left hip and leg physical therapy as you have information at home and if you wish to see a formal physical therapist let me know.    For pain I rather have you use Tylenol which is acetaminophen versus ibuprofen.  Can take acetaminophen 500 to 1000 mg up to 3 times  a day or max of 3000 mg in a day.    The longitudinal plan of care for the diagnosis(es)/condition(s) as documented were addressed during this visit. Due to the added complexity in care, I will continue to support Calin in the subsequent management and with ongoing continuity of care.  Helping to manage her hyperlipidemia and hypertension.    Patient has been advised of split billing requirements and indicates understanding: Yes        Counseling  Appropriate preventive services were addressed with this patient via screening, questionnaire, or discussion as appropriate for fall prevention, nutrition, physical activity, Tobacco-use cessation, social engagement, weight loss and cognition.  Checklist reviewing preventive services available has been given to the patient.  Reviewed patient's diet, addressing concerns and/or questions.           Subjective   Calin is a 69 year old, presenting for the following:  Physical (Annual Wellness ) and Bursitis (Left hip. History of doing some PT in the past. )        1/30/2025     3:25 PM   Additional Questions   Roomed by Swathi ALVARADO            Mood:  Doing well on med.    Left hip pain:  Goes down outer lateral leg to her foot. Hurts at night, not during the daytime.  Has done PT in the past and it helps.  Has the exercises at home.  Bio freeze helps.      Osteoarthritis: Gets steroid injectionssd in her shoulder and thumb at ortho.    Hypertension: Controlled on medication.    Hyperlipidemia: On medication.    Social:  Mother had cancer and spread to her bone.      Health Care Directive  Patient does not have a Health Care Directive: Patient states has Advance Directive and will bring in a copy to clinic.      1/26/2025   General Health   How would you rate your overall physical health? Good   Feel stress (tense, anxious, or unable to sleep) Not at all         1/26/2025   Nutrition   Diet: Gluten-free/reduced         1/26/2025   Exercise   Days per week of  moderate/strenous exercise 5 days   Average minutes spent exercising at this level 130 min         1/26/2025   Social Factors   Frequency of gathering with friends or relatives Twice a week   Worry food won't last until get money to buy more No   Food not last or not have enough money for food? No   Do you have housing? (Housing is defined as stable permanent housing and does not include staying ouside in a car, in a tent, in an abandoned building, in an overnight shelter, or couch-surfing.) Yes   Are you worried about losing your housing? No   Lack of transportation? No   Unable to get utilities (heat,electricity)? No         1/26/2025   Fall Risk   Fallen 2 or more times in the past year? No    No   Trouble with walking or balance? No    No       Multiple values from one day are sorted in reverse-chronological order          1/26/2025   Activities of Daily Living- Home Safety   Needs help with the following daily activites None of the above   Safety concerns in the home None of the above         1/26/2025   Dental   Dentist two times every year? Yes         1/26/2025   Hearing Screening   Hearing concerns? None of the above         1/26/2025   Driving Risk Screening   Patient/family members have concerns about driving No         1/26/2025   General Alertness/Fatigue Screening   Have you been more tired than usual lately? No         1/26/2025   Urinary Incontinence Screening   Bothered by leaking urine in past 6 months No         4/1/2024   TB Screening   Were you born outside of the [place]? No         Today's PHQ-2 Score:       1/30/2025     3:19 PM   PHQ-2 ( 1999 [employer])   Q1: Little interest or pleasure in doing things 0   Q2: Feeling down, depressed or hopeless 0   PHQ-2 Score 0    Q1: Little interest or pleasure in doing things Not at all   Q2: Feeling down, depressed or hopeless Not at all   PHQ-2 Score 0       Patient-reported           1/26/2025   Substance Use   Alcohol more than 3/day or more than 7/wk No    Do you have a current opioid prescription? No   How severe/bad is pain from 1 to 10? 5/10   Do you use any other substances recreationally? No     Social History     Tobacco Use    Smoking status: Former     Current packs/day: 0.00     Types: Cigarettes     Quit date: 10/19/1987     Years since quittin.3    Smokeless tobacco: Never    Tobacco comments:     30 years ago   Vaping Use    Vaping status: Never Used   Substance Use Topics    Alcohol use: Yes     Alcohol/week: 3.0 standard drinks of alcohol    Drug use: No           2024   LAST FHS-7 RESULTS   1st degree relative breast or ovarian cancer Yes   Any relative bilateral breast cancer No   Any male have breast cancer No   Any ONE woman have BOTH breast AND ovarian cancer No   Any woman with breast cancer before 50yrs No   2 or more relatives with breast AND/OR ovarian cancer No   2 or more relatives with breast AND/OR bowel cancer No        Mammogram Screening - Mammogram every 1-2 years updated in Health Maintenance based on mutual decision making    ASCVD Risk   The 10-year ASCVD risk score (Temi GAMBLE, et al., 2019) is: 12%    Values used to calculate the score:      Age: 69 years      Sex: Female      Is Non- : No      Diabetic: No      Tobacco smoker: No      Systolic Blood Pressure: 124 mmHg      Is BP treated: Yes      HDL Cholesterol: 68 mg/dL      Total Cholesterol: 154 mg/dL            Reviewed and updated as needed this visit by Provider                      Current providers sharing in care for this patient include:  Patient Care Team:  Amarilis Delgado MD as PCP - General (Family Medicine)  Tian Bal MD as MD (Gastroenterology)  Amarilis Delgado MD as Assigned PCP  Brooke Lizarraga PA-C as Physician Assistant (Physician Assistant - Medical)  Mack Garcia PA-C as Physician Assistant (Gastroenterology)  Mack Garcia PA-C as Assigned Gastroenterology Provider  Daniella Montemayor,  "MARINO as Physician Assistant (Urology)  Balaji Green MD as MD (Urology)  Balaji Green MD as Assigned Surgical Provider    The following health maintenance items are reviewed in Epic and correct as of today:  Health Maintenance   Topic Date Due    COVID-19 Vaccine (5 - 2024-25 season) Declined    BMP  Today    ANNUAL REVIEW OF HM ORDERS  Today    MEDICARE ANNUAL WELLNESS VISIT  Today    MAMMO SCREENING  Scheduled    LIPID  Today    PAP FOLLOW-UP  Next year    HPV FOLLOW-UP  Next year    FALL RISK ASSESSMENT  Today    DEXA  06/19/2026    GLUCOSE  Today    COLORECTAL CANCER SCREENING  01/19/2028    ADVANCE CARE PLANNING  Will bring copy to clinic    DTAP/TDAP/TD IMMUNIZATION (4 - Td or Tdap) 10/10/2029    PHQ-2 (once per calendar year)  Completed    INFLUENZA VACCINE  Completed    Pneumococcal Vaccine: 50+ Years  Completed    ZOSTER IMMUNIZATION  Completed    RSV VACCINE  Completed    HPV IMMUNIZATION  Aged Out    MENINGITIS IMMUNIZATION  Aged Out    RSV MONOCLONAL ANTIBODY  Aged Out    HEPATITIS C SCREENING  Discontinued            Objective    Exam  /82 (BP Location: Left arm, Patient Position: Sitting, Cuff Size: Adult Large)   Pulse 86   Temp 97.8  F (36.6  C) (Oral)   Resp 16   Ht 1.635 m (5' 4.37\")   Wt 66.6 kg (146 lb 12.8 oz)   SpO2 97%   BMI 24.91 kg/m     Estimated body mass index is 24.91 kg/m  as calculated from the following:    Height as of this encounter: 1.635 m (5' 4.37\").    Weight as of this encounter: 66.6 kg (146 lb 12.8 oz).    Physical Exam  GENERAL: alert and no distress  EYES: Eyes grossly normal to inspection, PERRL and conjunctivae and sclerae normal  HENT: ear canals and TM's normal, nose and mouth without ulcers or lesions  NECK: no adenopathy, no asymmetry, masses, or scars  RESP: lungs clear to auscultation - no rales, rhonchi or wheezes  BREAST: normal without masses, tenderness or nipple discharge and no palpable axillary masses or adenopathy  CV: regular " rate and rhythm, normal S1 S2, no S3 or S4, no murmur, click or rub, no peripheral edema  ABDOMEN: soft, nontender, no hepatosplenomegaly, no masses and bowel sounds normal  MS: no gross musculoskeletal defects noted, no edema  SKIN: no suspicious lesions or rashes, full-body skin exam not performed  NEURO: Normal strength and tone, mentation intact and speech normal  PSYCH: mentation appears normal, affect normal/bright        1/30/2025   Mini Cog   Clock Draw Score 2 Normal   3 Item Recall 3 objects recalled   Mini Cog Total Score 5              Signed Electronically by: Amarilis Delgado MD

## 2025-02-04 ENCOUNTER — MYC MEDICAL ADVICE (OUTPATIENT)
Dept: FAMILY MEDICINE | Facility: CLINIC | Age: 70
End: 2025-02-04
Payer: COMMERCIAL

## 2025-02-05 NOTE — TELEPHONE ENCOUNTER
Called and scheduled patient with provider on 02/12 at 10:40 am. She wanted to make sure that it was ok since she gets the shoulder injections as well, that this will not effect that. To provider to clarify on the multiple injections. Last should injections 10/08/24?

## 2025-02-05 NOTE — TELEPHONE ENCOUNTER
Yes I am comfortable doing the greater trochanteric bursa injection.  You can schedule it any time and 20 minutes should be fine and you can use a reserved spot.  Thank you.  Amarilis Delgado MD

## 2025-02-05 NOTE — TELEPHONE ENCOUNTER
I asked her to please call [facility] to ensure it is okay for me to give her a hip bursa injection and how soon after from her shoulder injections?  When she gets back to us I am happy to see her for the hip injection.  You can leave the appointment as scheduled but I need to know this information from Ortho.  Please have her call [facility].  Thank you.

## 2025-02-06 NOTE — TELEPHONE ENCOUNTER
Dr. Disla called back and after informing him of the situation, he said there shouldn't be a problem with patient getting an additional cortisone injection in her hip. The maximum dose that is allowed per year is 720mg. Patient routinely gets a total of 160mg in both of her shoulders combined. It has also been more than 3 months since her last injection. Dr. Delgado was informed of this and will move forward with scheduled hip injection for patient.

## 2025-02-06 NOTE — TELEPHONE ENCOUNTER
Called and spoke with Gloria at [facility] and she will be sending a message to Dr. Disla's team in regards to the injections, and if it is ok to do a hip injection. They will call back with an answer.

## 2025-02-11 ENCOUNTER — DOCUMENTATION ONLY (OUTPATIENT)
Dept: OTHER | Facility: CLINIC | Age: 70
End: 2025-02-11
Payer: COMMERCIAL

## 2025-02-12 ENCOUNTER — OFFICE VISIT (OUTPATIENT)
Dept: FAMILY MEDICINE | Facility: CLINIC | Age: 70
End: 2025-02-12
Payer: COMMERCIAL

## 2025-02-12 VITALS
HEIGHT: 64 IN | HEART RATE: 68 BPM | OXYGEN SATURATION: 98 % | RESPIRATION RATE: 16 BRPM | WEIGHT: 147 LBS | SYSTOLIC BLOOD PRESSURE: 135 MMHG | DIASTOLIC BLOOD PRESSURE: 85 MMHG | BODY MASS INDEX: 25.1 KG/M2

## 2025-02-12 DIAGNOSIS — M70.62 TROCHANTERIC BURSITIS OF LEFT HIP: Primary | ICD-10-CM

## 2025-02-12 PROCEDURE — 20610 DRAIN/INJ JOINT/BURSA W/O US: CPT | Performed by: FAMILY MEDICINE

## 2025-02-12 RX ORDER — BUPIVACAINE HYDROCHLORIDE 2.5 MG/ML
2 INJECTION, SOLUTION EPIDURAL; INFILTRATION; INTRACAUDAL ONCE
Status: COMPLETED | OUTPATIENT
Start: 2025-02-12 | End: 2025-02-12

## 2025-02-12 RX ORDER — METHYLPREDNISOLONE ACETATE 40 MG/ML
40 INJECTION, SUSPENSION INTRA-ARTICULAR; INTRALESIONAL; INTRAMUSCULAR; SOFT TISSUE ONCE
Status: COMPLETED | OUTPATIENT
Start: 2025-02-12 | End: 2025-02-12

## 2025-02-12 RX ADMIN — METHYLPREDNISOLONE ACETATE 40 MG: 40 INJECTION, SUSPENSION INTRA-ARTICULAR; INTRALESIONAL; INTRAMUSCULAR; SOFT TISSUE at 10:57

## 2025-02-12 RX ADMIN — BUPIVACAINE HYDROCHLORIDE 5 MG: 2.5 INJECTION, SOLUTION EPIDURAL; INFILTRATION; INTRACAUDAL at 10:58

## 2025-02-12 NOTE — PROGRESS NOTES
Injection Documentation     Injection was administered by provider (please see MAR for given by information). Please see MAR and medication order for additional information.     Site: Bursa injection   Medication Used: 40 mg methylprednisolone and 2 mg bupivacaine.  See MAR.    Expiration Date:  2025            Answers submitted by the patient for this visit:  General Questionnaire (Submitted on 2/8/2025)  Chief Complaint: Chronic problems general questions HPI Form  What is the reason for your visit today? : Cortisone  How many days per week do you miss taking your medication?: 0  Questionnaire about: Chronic problems general questions HPI Form (Submitted on 2/8/2025)  Chief Complaint: Chronic problems general questions HPI Form  PROCEDURE:  JOINT ASPIRATION/INJECTION.      INJECTION:  After a discussion of risks, benefits and side effects of procedure, informed patient consent was obtained.  The left greater trochanteric bursa was prepped and draped in the usual clean fashion (sterile not required for this procedure), but cleansed with Betadine and alcohol.  1.0 cc of methylprednisolone which is 40 mg/mL so 40 mg and 2 cc of 0.25% Sensorcaine was successfully injected without complication.  Patient did not experience some pain relief following injection.      Injecting the left greater trochanteric bursa with 40 mg of methylprednisolone.    No strenuous activity for a week including pickleball.    Light exercise is safe like walking or swimming nothing are you doing strenuous activity with the legs fro 1 week.     Ice or heat is fine to use if needed.    You could have increased pain for a day or 2 called a steroid flare and then it should improve.      You can use Tylenol as needed.    You did have some mild arthritis on the x-ray of your left hip.  If you feel you are having pain within the hip joint you could see Ortho and they could order an intra-articular joint injection but I am hoping since the pain is  on the outside that this pain is coming from the bursa.

## 2025-02-12 NOTE — PATIENT INSTRUCTIONS
Injecting the left greater trochanteric bursa with 40 mg of methylprednisolone.    No strenuous activity for a week including pickleball.    Light exercise is safe like walking or swimming nothing are you doing strenuous activity with the legs fro 1 week.     Ice or heat is fine to use if needed.    You could have increased pain for a day or 2 called a steroid flare and then it should improve.      You can use Tylenol as needed.    You did have some mild arthritis on the x-ray of your left hip.  If you feel you are having pain within the hip joint you could see Ortho and they could order an intra-articular joint injection but I am hoping since the pain is on the outside that this pain is coming from the bursa.

## 2025-02-17 ENCOUNTER — ANCILLARY PROCEDURE (OUTPATIENT)
Dept: MAMMOGRAPHY | Facility: CLINIC | Age: 70
End: 2025-02-17
Attending: FAMILY MEDICINE
Payer: COMMERCIAL

## 2025-02-17 DIAGNOSIS — Z12.31 VISIT FOR SCREENING MAMMOGRAM: ICD-10-CM

## 2025-02-17 PROCEDURE — 77063 BREAST TOMOSYNTHESIS BI: CPT

## 2025-02-24 ENCOUNTER — PATIENT OUTREACH (OUTPATIENT)
Dept: CARE COORDINATION | Facility: CLINIC | Age: 70
End: 2025-02-24
Payer: COMMERCIAL

## 2025-03-09 DIAGNOSIS — I10 ESSENTIAL HYPERTENSION: ICD-10-CM

## 2025-03-10 RX ORDER — LOSARTAN POTASSIUM 50 MG/1
50 TABLET ORAL DAILY
Qty: 90 TABLET | Refills: 2 | Status: SHIPPED | OUTPATIENT
Start: 2025-03-10

## 2025-04-04 ENCOUNTER — TRANSFERRED RECORDS (OUTPATIENT)
Dept: HEALTH INFORMATION MANAGEMENT | Facility: CLINIC | Age: 70
End: 2025-04-04
Payer: COMMERCIAL

## 2025-04-14 ENCOUNTER — TRANSFERRED RECORDS (OUTPATIENT)
Dept: HEALTH INFORMATION MANAGEMENT | Facility: CLINIC | Age: 70
End: 2025-04-14
Payer: COMMERCIAL

## 2025-04-19 DIAGNOSIS — E78.5 HYPERLIPIDEMIA LDL GOAL <130: ICD-10-CM

## 2025-04-21 RX ORDER — ROSUVASTATIN CALCIUM 5 MG/1
5 TABLET, COATED ORAL DAILY
Qty: 90 TABLET | Refills: 1 | Status: SHIPPED | OUTPATIENT
Start: 2025-04-21

## 2025-05-07 DIAGNOSIS — R45.86 MOOD CHANGE: ICD-10-CM

## 2025-05-08 RX ORDER — BUPROPION HYDROCHLORIDE 300 MG/1
300 TABLET ORAL EVERY MORNING
Qty: 90 TABLET | Refills: 1 | Status: SHIPPED | OUTPATIENT
Start: 2025-05-08

## 2025-05-19 ENCOUNTER — TRANSFERRED RECORDS (OUTPATIENT)
Dept: HEALTH INFORMATION MANAGEMENT | Facility: CLINIC | Age: 70
End: 2025-05-19
Payer: COMMERCIAL

## 2025-08-08 DIAGNOSIS — M19.90 OSTEOARTHRITIS, UNSPECIFIED OSTEOARTHRITIS TYPE, UNSPECIFIED SITE: Primary | ICD-10-CM

## 2025-08-08 DIAGNOSIS — J31.0 CHRONIC RHINITIS: ICD-10-CM

## 2025-08-08 DIAGNOSIS — E78.5 HYPERLIPIDEMIA LDL GOAL <130: ICD-10-CM

## 2025-08-09 RX ORDER — AZELASTINE HYDROCHLORIDE 137 UG/1
2 SPRAY, METERED NASAL DAILY
Qty: 30 ML | Refills: 3 | Status: SHIPPED | OUTPATIENT
Start: 2025-08-09

## 2025-08-09 RX ORDER — IBUPROFEN 800 MG/1
800 TABLET, FILM COATED ORAL
Qty: 90 TABLET | Refills: 3 | Status: SHIPPED | OUTPATIENT
Start: 2025-08-09

## 2025-08-09 RX ORDER — ROSUVASTATIN CALCIUM 5 MG/1
5 TABLET, COATED ORAL DAILY
Qty: 90 TABLET | Refills: 1 | Status: SHIPPED | OUTPATIENT
Start: 2025-08-09

## (undated) DEVICE — ESU PENCIL SMOKE EVAC W/ROCKER SWITCH 0703-047-000

## (undated) DEVICE — DECANTER TRANSFER DEVICE 2008S

## (undated) DEVICE — SYR BULB IRRIG DOVER 60 ML LATEX FREE 67000

## (undated) DEVICE — PACK MINOR SBA15MIFSE

## (undated) DEVICE — RETR ELASTIC STAYS 3311-1G

## (undated) DEVICE — SUCTION TIP YANKAUER W/O VENT K86

## (undated) DEVICE — TUBING IRRIG TUR Y TYPE 96" LF 6543-01

## (undated) DEVICE — SYR 10ML LL W/O NDL

## (undated) DEVICE — SPONGE PACK VAGINAL 2"X9

## (undated) DEVICE — GLOVE BIOGEL PI ULTRATOUCH G SZ 8.0 42180

## (undated) DEVICE — SU VICRYL 2-0 UR-5 27" J375H

## (undated) DEVICE — DRAPE GYN/UROLOGY FLUID POUCH TUR 29455

## (undated) DEVICE — CATH FOLEY 18FR 5ML SILICONE LUBRI-SIL 175818

## (undated) DEVICE — DRAPE VAGI BAG 18X9" 1072

## (undated) DEVICE — SOL WATER IRRIG 1000ML BOTTLE 07139-09

## (undated) DEVICE — RETR RING LONE STAR 14.1X14.1CM 3307G

## (undated) DEVICE — NDL 19GA 1.5"

## (undated) DEVICE — PAD PERI W/WINGS 1580A

## (undated) DEVICE — PREP SKIN SCRUB TRAY 4461A

## (undated) RX ORDER — ACETAMINOPHEN 325 MG/1
TABLET ORAL
Status: DISPENSED
Start: 2024-04-15

## (undated) RX ORDER — PROPOFOL 10 MG/ML
INJECTION, EMULSION INTRAVENOUS
Status: DISPENSED
Start: 2024-04-15

## (undated) RX ORDER — ONDANSETRON 2 MG/ML
INJECTION INTRAMUSCULAR; INTRAVENOUS
Status: DISPENSED
Start: 2024-04-15

## (undated) RX ORDER — CEFAZOLIN SODIUM 2 G/50ML
SOLUTION INTRAVENOUS
Status: DISPENSED
Start: 2024-04-15

## (undated) RX ORDER — OXYCODONE HYDROCHLORIDE 5 MG/1
TABLET ORAL
Status: DISPENSED
Start: 2024-04-15

## (undated) RX ORDER — FENTANYL CITRATE 50 UG/ML
INJECTION, SOLUTION INTRAMUSCULAR; INTRAVENOUS
Status: DISPENSED
Start: 2024-04-15